# Patient Record
Sex: MALE | Race: WHITE | NOT HISPANIC OR LATINO | Employment: OTHER | URBAN - METROPOLITAN AREA
[De-identification: names, ages, dates, MRNs, and addresses within clinical notes are randomized per-mention and may not be internally consistent; named-entity substitution may affect disease eponyms.]

---

## 2017-05-25 ENCOUNTER — TRANSFERRED RECORDS (OUTPATIENT)
Dept: HEALTH INFORMATION MANAGEMENT | Facility: CLINIC | Age: 67
End: 2017-05-25

## 2017-06-22 ENCOUNTER — RECORDS - HEALTHEAST (OUTPATIENT)
Dept: LAB | Facility: CLINIC | Age: 67
End: 2017-06-22

## 2017-06-22 LAB
CHOLEST SERPL-MCNC: 233 MG/DL
FASTING STATUS PATIENT QL REPORTED: YES
HDLC SERPL-MCNC: 86 MG/DL
LDLC SERPL CALC-MCNC: 122 MG/DL
PSA SERPL-MCNC: 3.3 NG/ML (ref 0–4.5)
TRIGL SERPL-MCNC: 124 MG/DL

## 2017-08-03 ENCOUNTER — TRANSFERRED RECORDS (OUTPATIENT)
Dept: HEALTH INFORMATION MANAGEMENT | Facility: CLINIC | Age: 67
End: 2017-08-03

## 2017-10-06 ENCOUNTER — TRANSFERRED RECORDS (OUTPATIENT)
Dept: HEALTH INFORMATION MANAGEMENT | Facility: CLINIC | Age: 67
End: 2017-10-06

## 2017-12-05 ENCOUNTER — TRANSFERRED RECORDS (OUTPATIENT)
Dept: HEALTH INFORMATION MANAGEMENT | Facility: CLINIC | Age: 67
End: 2017-12-05

## 2017-12-05 ENCOUNTER — RECORDS - HEALTHEAST (OUTPATIENT)
Dept: LAB | Facility: CLINIC | Age: 67
End: 2017-12-05

## 2017-12-05 LAB
CHOLEST SERPL-MCNC: 263 MG/DL
FASTING STATUS PATIENT QL REPORTED: ABNORMAL
HDLC SERPL-MCNC: 96 MG/DL
LDLC SERPL CALC-MCNC: 138 MG/DL
TRIGL SERPL-MCNC: 147 MG/DL

## 2017-12-08 ENCOUNTER — TRANSFERRED RECORDS (OUTPATIENT)
Dept: HEALTH INFORMATION MANAGEMENT | Facility: CLINIC | Age: 67
End: 2017-12-08

## 2018-07-16 ENCOUNTER — RECORDS - HEALTHEAST (OUTPATIENT)
Dept: LAB | Facility: CLINIC | Age: 68
End: 2018-07-16

## 2018-07-16 LAB
ALBUMIN SERPL-MCNC: 4 G/DL (ref 3.5–5)
ALP SERPL-CCNC: 53 U/L (ref 45–120)
ALT SERPL W P-5'-P-CCNC: 31 U/L (ref 0–45)
ANION GAP SERPL CALCULATED.3IONS-SCNC: 12 MMOL/L (ref 5–18)
AST SERPL W P-5'-P-CCNC: 43 U/L (ref 0–40)
BASOPHILS # BLD AUTO: 0.1 THOU/UL (ref 0–0.2)
BASOPHILS NFR BLD AUTO: 1 % (ref 0–2)
BILIRUB SERPL-MCNC: 0.6 MG/DL (ref 0–1)
BUN SERPL-MCNC: 16 MG/DL (ref 8–22)
CALCIUM SERPL-MCNC: 9.4 MG/DL (ref 8.5–10.5)
CHLORIDE BLD-SCNC: 107 MMOL/L (ref 98–107)
CHOLEST SERPL-MCNC: 203 MG/DL
CO2 SERPL-SCNC: 22 MMOL/L (ref 22–31)
CREAT SERPL-MCNC: 0.9 MG/DL (ref 0.7–1.3)
EOSINOPHIL # BLD AUTO: 0.1 THOU/UL (ref 0–0.4)
EOSINOPHIL NFR BLD AUTO: 2 % (ref 0–6)
ERYTHROCYTE [DISTWIDTH] IN BLOOD BY AUTOMATED COUNT: 12.1 % (ref 11–14.5)
FASTING STATUS PATIENT QL REPORTED: ABNORMAL
GFR SERPL CREATININE-BSD FRML MDRD: >60 ML/MIN/1.73M2
GLUCOSE BLD-MCNC: 88 MG/DL (ref 70–125)
HCT VFR BLD AUTO: 41.1 % (ref 40–54)
HDLC SERPL-MCNC: 100 MG/DL
HGB BLD-MCNC: 13.9 G/DL (ref 14–18)
LDLC SERPL CALC-MCNC: 75 MG/DL
LYMPHOCYTES # BLD AUTO: 1.5 THOU/UL (ref 0.8–4.4)
LYMPHOCYTES NFR BLD AUTO: 20 % (ref 20–40)
MCH RBC QN AUTO: 32.9 PG (ref 27–34)
MCHC RBC AUTO-ENTMCNC: 33.8 G/DL (ref 32–36)
MCV RBC AUTO: 97 FL (ref 80–100)
MONOCYTES # BLD AUTO: 0.9 THOU/UL (ref 0–0.9)
MONOCYTES NFR BLD AUTO: 12 % (ref 2–10)
NEUTROPHILS # BLD AUTO: 4.8 THOU/UL (ref 2–7.7)
NEUTROPHILS NFR BLD AUTO: 66 % (ref 50–70)
PLATELET # BLD AUTO: 167 THOU/UL (ref 140–440)
PMV BLD AUTO: 10.1 FL (ref 8.5–12.5)
POTASSIUM BLD-SCNC: 4.5 MMOL/L (ref 3.5–5)
PROT SERPL-MCNC: 6.7 G/DL (ref 6–8)
PSA SERPL-MCNC: 2.8 NG/ML (ref 0–4.5)
RBC # BLD AUTO: 4.22 MILL/UL (ref 4.4–6.2)
SODIUM SERPL-SCNC: 141 MMOL/L (ref 136–145)
TRIGL SERPL-MCNC: 140 MG/DL
WBC: 7.4 THOU/UL (ref 4–11)

## 2019-04-11 ENCOUNTER — TRANSFERRED RECORDS (OUTPATIENT)
Dept: HEALTH INFORMATION MANAGEMENT | Facility: CLINIC | Age: 69
End: 2019-04-11

## 2019-05-04 ENCOUNTER — TRANSFERRED RECORDS (OUTPATIENT)
Dept: HEALTH INFORMATION MANAGEMENT | Facility: CLINIC | Age: 69
End: 2019-05-04

## 2019-07-15 ENCOUNTER — OFFICE VISIT - HEALTHEAST (OUTPATIENT)
Dept: ADDICTION MEDICINE | Facility: CLINIC | Age: 69
End: 2019-07-15

## 2019-07-15 DIAGNOSIS — Z03.89 NO DIAGNOSIS ON AXIS I: ICD-10-CM

## 2019-07-16 ENCOUNTER — AMBULATORY - HEALTHEAST (OUTPATIENT)
Dept: OTHER | Facility: CLINIC | Age: 69
End: 2019-07-16

## 2019-07-16 ENCOUNTER — DOCUMENTATION ONLY (OUTPATIENT)
Dept: OTHER | Facility: CLINIC | Age: 69
End: 2019-07-16

## 2019-07-22 ENCOUNTER — OFFICE VISIT - HEALTHEAST (OUTPATIENT)
Dept: ADDICTION MEDICINE | Facility: CLINIC | Age: 69
End: 2019-07-22

## 2019-07-22 ENCOUNTER — RECORDS - HEALTHEAST (OUTPATIENT)
Dept: LAB | Facility: CLINIC | Age: 69
End: 2019-07-22

## 2019-07-22 DIAGNOSIS — F10.20 SEVERE ALCOHOL USE DISORDER (H): ICD-10-CM

## 2019-07-23 ENCOUNTER — OFFICE VISIT - HEALTHEAST (OUTPATIENT)
Dept: ADDICTION MEDICINE | Facility: CLINIC | Age: 69
End: 2019-07-23

## 2019-07-23 ENCOUNTER — AMBULATORY - HEALTHEAST (OUTPATIENT)
Dept: ADDICTION MEDICINE | Facility: CLINIC | Age: 69
End: 2019-07-23

## 2019-07-23 DIAGNOSIS — F10.20 SEVERE ALCOHOL USE DISORDER (H): ICD-10-CM

## 2019-07-23 LAB
ALBUMIN SERPL-MCNC: 3.9 G/DL (ref 3.5–5)
ALP SERPL-CCNC: 69 U/L (ref 45–120)
ALT SERPL W P-5'-P-CCNC: 26 U/L (ref 0–45)
ANION GAP SERPL CALCULATED.3IONS-SCNC: 8 MMOL/L (ref 5–18)
AST SERPL W P-5'-P-CCNC: 30 U/L (ref 0–40)
BILIRUB SERPL-MCNC: 0.4 MG/DL (ref 0–1)
BUN SERPL-MCNC: 21 MG/DL (ref 8–22)
CALCIUM SERPL-MCNC: 9 MG/DL (ref 8.5–10.5)
CHLORIDE BLD-SCNC: 110 MMOL/L (ref 98–107)
CHOLEST SERPL-MCNC: 205 MG/DL
CO2 SERPL-SCNC: 24 MMOL/L (ref 22–31)
CREAT SERPL-MCNC: 1.07 MG/DL (ref 0.7–1.3)
FASTING STATUS PATIENT QL REPORTED: NO
GFR SERPL CREATININE-BSD FRML MDRD: >60 ML/MIN/1.73M2
GLUCOSE BLD-MCNC: 100 MG/DL (ref 70–125)
HDLC SERPL-MCNC: 70 MG/DL
LDLC SERPL CALC-MCNC: 88 MG/DL
POTASSIUM BLD-SCNC: 4.8 MMOL/L (ref 3.5–5)
PROT SERPL-MCNC: 6.7 G/DL (ref 6–8)
PSA SERPL-MCNC: 2.4 NG/ML (ref 0–4.5)
SODIUM SERPL-SCNC: 142 MMOL/L (ref 136–145)
TRIGL SERPL-MCNC: 236 MG/DL

## 2019-07-25 ENCOUNTER — OFFICE VISIT - HEALTHEAST (OUTPATIENT)
Dept: ADDICTION MEDICINE | Facility: CLINIC | Age: 69
End: 2019-07-25

## 2019-07-25 DIAGNOSIS — F10.20 SEVERE ALCOHOL USE DISORDER (H): ICD-10-CM

## 2019-07-29 ENCOUNTER — OFFICE VISIT - HEALTHEAST (OUTPATIENT)
Dept: ADDICTION MEDICINE | Facility: CLINIC | Age: 69
End: 2019-07-29

## 2019-07-29 DIAGNOSIS — F10.20 SEVERE ALCOHOL USE DISORDER (H): ICD-10-CM

## 2019-07-30 ENCOUNTER — OFFICE VISIT - HEALTHEAST (OUTPATIENT)
Dept: ADDICTION MEDICINE | Facility: CLINIC | Age: 69
End: 2019-07-30

## 2019-07-30 ENCOUNTER — AMBULATORY - HEALTHEAST (OUTPATIENT)
Dept: ADDICTION MEDICINE | Facility: CLINIC | Age: 69
End: 2019-07-30

## 2019-07-30 ENCOUNTER — OFFICE VISIT - HEALTHEAST (OUTPATIENT)
Dept: BEHAVIORAL HEALTH | Facility: CLINIC | Age: 69
End: 2019-07-30

## 2019-07-30 DIAGNOSIS — F10.20 SEVERE ALCOHOL USE DISORDER (H): ICD-10-CM

## 2019-07-30 DIAGNOSIS — F10.982 ALCOHOL-INDUCED INSOMNIA (H): ICD-10-CM

## 2019-07-30 DIAGNOSIS — F33.41 RECURRENT MAJOR DEPRESSIVE DISORDER, IN PARTIAL REMISSION (H): ICD-10-CM

## 2019-07-30 LAB
AMPHETAMINES UR QL SCN: NORMAL
BARBITURATES UR QL: NORMAL
BENZODIAZ UR QL: NORMAL
CANNABINOIDS UR QL SCN: NORMAL
COCAINE UR QL: NORMAL
CREAT UR-MCNC: 63 MG/DL
METHADONE UR QL SCN: NORMAL
OPIATES UR QL SCN: NORMAL
OXYCODONE UR QL: NORMAL
PCP UR QL SCN: NORMAL

## 2019-07-30 ASSESSMENT — MIFFLIN-ST. JEOR: SCORE: 1623.08

## 2019-08-02 ENCOUNTER — OFFICE VISIT - HEALTHEAST (OUTPATIENT)
Dept: ADDICTION MEDICINE | Facility: CLINIC | Age: 69
End: 2019-08-02

## 2019-08-02 DIAGNOSIS — F10.20 SEVERE ALCOHOL USE DISORDER (H): ICD-10-CM

## 2019-08-03 LAB
ETHYL GLUCURONIDE UR CFM-MCNC: <100 NG/ML
ETHYL SULFATE UR CFM-MCNC: <100 NG/ML

## 2019-08-05 ENCOUNTER — OFFICE VISIT - HEALTHEAST (OUTPATIENT)
Dept: ADDICTION MEDICINE | Facility: CLINIC | Age: 69
End: 2019-08-05

## 2019-08-05 DIAGNOSIS — F10.20 SEVERE ALCOHOL USE DISORDER (H): ICD-10-CM

## 2019-08-07 ENCOUNTER — OFFICE VISIT - HEALTHEAST (OUTPATIENT)
Dept: ADDICTION MEDICINE | Facility: CLINIC | Age: 69
End: 2019-08-07

## 2019-08-07 ENCOUNTER — OFFICE VISIT - HEALTHEAST (OUTPATIENT)
Dept: BEHAVIORAL HEALTH | Facility: CLINIC | Age: 69
End: 2019-08-07

## 2019-08-07 DIAGNOSIS — F10.20 SEVERE ALCOHOL USE DISORDER (H): ICD-10-CM

## 2019-08-07 DIAGNOSIS — F33.41 RECURRENT MAJOR DEPRESSIVE DISORDER, IN PARTIAL REMISSION (H): ICD-10-CM

## 2019-08-07 DIAGNOSIS — F10.982 ALCOHOL-INDUCED INSOMNIA (H): ICD-10-CM

## 2019-08-07 LAB
AMPHETAMINES UR QL SCN: NORMAL
BARBITURATES UR QL: NORMAL
BENZODIAZ UR QL: NORMAL
CANNABINOIDS UR QL SCN: NORMAL
COCAINE UR QL: NORMAL
CREAT UR-MCNC: 92.4 MG/DL
METHADONE UR QL SCN: NORMAL
OPIATES UR QL SCN: NORMAL
OXYCODONE UR QL: NORMAL
PCP UR QL SCN: NORMAL

## 2019-08-07 ASSESSMENT — MIFFLIN-ST. JEOR: SCORE: 1627.62

## 2019-08-09 ENCOUNTER — OFFICE VISIT - HEALTHEAST (OUTPATIENT)
Dept: ADDICTION MEDICINE | Facility: CLINIC | Age: 69
End: 2019-08-09

## 2019-08-09 ENCOUNTER — AMBULATORY - HEALTHEAST (OUTPATIENT)
Dept: ADDICTION MEDICINE | Facility: CLINIC | Age: 69
End: 2019-08-09

## 2019-08-09 DIAGNOSIS — F10.20 SEVERE ALCOHOL USE DISORDER (H): ICD-10-CM

## 2019-08-12 ENCOUNTER — OFFICE VISIT - HEALTHEAST (OUTPATIENT)
Dept: ADDICTION MEDICINE | Facility: CLINIC | Age: 69
End: 2019-08-12

## 2019-08-12 DIAGNOSIS — F10.20 SEVERE ALCOHOL USE DISORDER (H): ICD-10-CM

## 2019-08-13 LAB
ETHYL GLUCURONIDE UR CFM-MCNC: <100 NG/ML
ETHYL SULFATE UR CFM-MCNC: <100 NG/ML

## 2019-08-14 ENCOUNTER — TRANSFERRED RECORDS (OUTPATIENT)
Dept: HEALTH INFORMATION MANAGEMENT | Facility: CLINIC | Age: 69
End: 2019-08-14

## 2019-08-14 ENCOUNTER — OFFICE VISIT - HEALTHEAST (OUTPATIENT)
Dept: ADDICTION MEDICINE | Facility: CLINIC | Age: 69
End: 2019-08-14

## 2019-08-14 DIAGNOSIS — F10.20 SEVERE ALCOHOL USE DISORDER (H): ICD-10-CM

## 2019-08-16 ENCOUNTER — AMBULATORY - HEALTHEAST (OUTPATIENT)
Dept: ADDICTION MEDICINE | Facility: CLINIC | Age: 69
End: 2019-08-16

## 2019-08-16 ENCOUNTER — OFFICE VISIT - HEALTHEAST (OUTPATIENT)
Dept: ADDICTION MEDICINE | Facility: CLINIC | Age: 69
End: 2019-08-16

## 2019-08-16 DIAGNOSIS — F10.20 SEVERE ALCOHOL USE DISORDER (H): ICD-10-CM

## 2019-08-19 ENCOUNTER — OFFICE VISIT - HEALTHEAST (OUTPATIENT)
Dept: ADDICTION MEDICINE | Facility: CLINIC | Age: 69
End: 2019-08-19

## 2019-08-19 DIAGNOSIS — F10.20 SEVERE ALCOHOL USE DISORDER (H): ICD-10-CM

## 2019-08-21 ENCOUNTER — TRANSFERRED RECORDS (OUTPATIENT)
Dept: HEALTH INFORMATION MANAGEMENT | Facility: CLINIC | Age: 69
End: 2019-08-21

## 2019-08-22 ENCOUNTER — AMBULATORY - HEALTHEAST (OUTPATIENT)
Dept: ADDICTION MEDICINE | Facility: CLINIC | Age: 69
End: 2019-08-22

## 2019-08-23 ENCOUNTER — OFFICE VISIT - HEALTHEAST (OUTPATIENT)
Dept: ADDICTION MEDICINE | Facility: CLINIC | Age: 69
End: 2019-08-23

## 2019-08-23 ENCOUNTER — AMBULATORY - HEALTHEAST (OUTPATIENT)
Dept: LAB | Facility: CLINIC | Age: 69
End: 2019-08-23

## 2019-08-23 DIAGNOSIS — F10.20 SEVERE ALCOHOL USE DISORDER (H): ICD-10-CM

## 2019-08-23 LAB
AMPHETAMINES UR QL SCN: NORMAL
BARBITURATES UR QL: NORMAL
BENZODIAZ UR QL: NORMAL
CANNABINOIDS UR QL SCN: NORMAL
COCAINE UR QL: NORMAL
CREAT UR-MCNC: 50.2 MG/DL
METHADONE UR QL SCN: NORMAL
OPIATES UR QL SCN: NORMAL
OXYCODONE UR QL: NORMAL
PCP UR QL SCN: NORMAL

## 2019-08-26 ENCOUNTER — OFFICE VISIT - HEALTHEAST (OUTPATIENT)
Dept: ADDICTION MEDICINE | Facility: CLINIC | Age: 69
End: 2019-08-26

## 2019-08-26 DIAGNOSIS — F10.20 SEVERE ALCOHOL USE DISORDER (H): ICD-10-CM

## 2019-08-28 ENCOUNTER — OFFICE VISIT - HEALTHEAST (OUTPATIENT)
Dept: ADDICTION MEDICINE | Facility: CLINIC | Age: 69
End: 2019-08-28

## 2019-08-28 DIAGNOSIS — F10.20 SEVERE ALCOHOL USE DISORDER (H): ICD-10-CM

## 2019-08-28 LAB
ETHYL GLUCURONIDE UR CFM-MCNC: <100 NG/ML
ETHYL SULFATE UR CFM-MCNC: <100 NG/ML

## 2019-08-29 ENCOUNTER — AMBULATORY - HEALTHEAST (OUTPATIENT)
Dept: ADDICTION MEDICINE | Facility: CLINIC | Age: 69
End: 2019-08-29

## 2019-08-29 ENCOUNTER — COMMUNICATION - HEALTHEAST (OUTPATIENT)
Dept: BEHAVIORAL HEALTH | Facility: CLINIC | Age: 69
End: 2019-08-29

## 2019-08-30 ENCOUNTER — OFFICE VISIT - HEALTHEAST (OUTPATIENT)
Dept: ADDICTION MEDICINE | Facility: CLINIC | Age: 69
End: 2019-08-30

## 2019-08-30 DIAGNOSIS — F10.20 SEVERE ALCOHOL USE DISORDER (H): ICD-10-CM

## 2019-09-04 ENCOUNTER — OFFICE VISIT - HEALTHEAST (OUTPATIENT)
Dept: ADDICTION MEDICINE | Facility: CLINIC | Age: 69
End: 2019-09-04

## 2019-09-04 ENCOUNTER — COMMUNICATION - HEALTHEAST (OUTPATIENT)
Dept: BEHAVIORAL HEALTH | Facility: CLINIC | Age: 69
End: 2019-09-04

## 2019-09-04 DIAGNOSIS — F10.20 SEVERE ALCOHOL USE DISORDER (H): ICD-10-CM

## 2019-09-05 ENCOUNTER — AMBULATORY - HEALTHEAST (OUTPATIENT)
Dept: ADDICTION MEDICINE | Facility: CLINIC | Age: 69
End: 2019-09-05

## 2019-09-06 ENCOUNTER — OFFICE VISIT - HEALTHEAST (OUTPATIENT)
Dept: ADDICTION MEDICINE | Facility: CLINIC | Age: 69
End: 2019-09-06

## 2019-09-06 DIAGNOSIS — F10.20 SEVERE ALCOHOL USE DISORDER (H): ICD-10-CM

## 2019-09-09 ENCOUNTER — OFFICE VISIT - HEALTHEAST (OUTPATIENT)
Dept: ADDICTION MEDICINE | Facility: CLINIC | Age: 69
End: 2019-09-09

## 2019-09-09 ENCOUNTER — OFFICE VISIT - HEALTHEAST (OUTPATIENT)
Dept: BEHAVIORAL HEALTH | Facility: CLINIC | Age: 69
End: 2019-09-09

## 2019-09-09 DIAGNOSIS — F10.20 SEVERE ALCOHOL USE DISORDER (H): ICD-10-CM

## 2019-09-09 DIAGNOSIS — F33.41 RECURRENT MAJOR DEPRESSIVE DISORDER, IN PARTIAL REMISSION (H): ICD-10-CM

## 2019-09-09 LAB
AMPHETAMINES UR QL SCN: NORMAL
BARBITURATES UR QL: NORMAL
BENZODIAZ UR QL: NORMAL
CANNABINOIDS UR QL SCN: NORMAL
COCAINE UR QL: NORMAL
CREAT UR-MCNC: 54.4 MG/DL
METHADONE UR QL SCN: NORMAL
OPIATES UR QL SCN: NORMAL
OXYCODONE UR QL: NORMAL
PCP UR QL SCN: NORMAL

## 2019-09-09 ASSESSMENT — ANXIETY QUESTIONNAIRES
2. NOT BEING ABLE TO STOP OR CONTROL WORRYING: NOT AT ALL
3. WORRYING TOO MUCH ABOUT DIFFERENT THINGS: SEVERAL DAYS
IF YOU CHECKED OFF ANY PROBLEMS ON THIS QUESTIONNAIRE, HOW DIFFICULT HAVE THESE PROBLEMS MADE IT FOR YOU TO DO YOUR WORK, TAKE CARE OF THINGS AT HOME, OR GET ALONG WITH OTHER PEOPLE: SOMEWHAT DIFFICULT
4. TROUBLE RELAXING: MORE THAN HALF THE DAYS
GAD7 TOTAL SCORE: 7
1. FEELING NERVOUS, ANXIOUS, OR ON EDGE: SEVERAL DAYS
5. BEING SO RESTLESS THAT IT IS HARD TO SIT STILL: SEVERAL DAYS
7. FEELING AFRAID AS IF SOMETHING AWFUL MIGHT HAPPEN: SEVERAL DAYS
6. BECOMING EASILY ANNOYED OR IRRITABLE: SEVERAL DAYS

## 2019-09-09 ASSESSMENT — PATIENT HEALTH QUESTIONNAIRE - PHQ9: SUM OF ALL RESPONSES TO PHQ QUESTIONS 1-9: 15

## 2019-09-09 ASSESSMENT — MIFFLIN-ST. JEOR: SCORE: 1663.91

## 2019-09-11 ENCOUNTER — OFFICE VISIT - HEALTHEAST (OUTPATIENT)
Dept: ADDICTION MEDICINE | Facility: CLINIC | Age: 69
End: 2019-09-11

## 2019-09-11 DIAGNOSIS — F10.20 SEVERE ALCOHOL USE DISORDER (H): ICD-10-CM

## 2019-09-12 ENCOUNTER — AMBULATORY - HEALTHEAST (OUTPATIENT)
Dept: ADDICTION MEDICINE | Facility: CLINIC | Age: 69
End: 2019-09-12

## 2019-09-12 LAB
ETHYL GLUCURONIDE UR CFM-MCNC: <100 NG/ML
ETHYL SULFATE UR CFM-MCNC: <100 NG/ML

## 2019-09-13 ENCOUNTER — OFFICE VISIT - HEALTHEAST (OUTPATIENT)
Dept: ADDICTION MEDICINE | Facility: CLINIC | Age: 69
End: 2019-09-13

## 2019-09-13 DIAGNOSIS — F10.20 SEVERE ALCOHOL USE DISORDER (H): ICD-10-CM

## 2019-09-16 ENCOUNTER — OFFICE VISIT - HEALTHEAST (OUTPATIENT)
Dept: ADDICTION MEDICINE | Facility: CLINIC | Age: 69
End: 2019-09-16

## 2019-09-16 DIAGNOSIS — F10.20 SEVERE ALCOHOL USE DISORDER (H): ICD-10-CM

## 2019-09-18 ENCOUNTER — OFFICE VISIT - HEALTHEAST (OUTPATIENT)
Dept: ADDICTION MEDICINE | Facility: CLINIC | Age: 69
End: 2019-09-18

## 2019-09-18 DIAGNOSIS — F10.20 SEVERE ALCOHOL USE DISORDER (H): ICD-10-CM

## 2019-09-19 ENCOUNTER — AMBULATORY - HEALTHEAST (OUTPATIENT)
Dept: ADDICTION MEDICINE | Facility: CLINIC | Age: 69
End: 2019-09-19

## 2019-09-20 ENCOUNTER — OFFICE VISIT - HEALTHEAST (OUTPATIENT)
Dept: ADDICTION MEDICINE | Facility: CLINIC | Age: 69
End: 2019-09-20

## 2019-09-20 DIAGNOSIS — F10.20 SEVERE ALCOHOL USE DISORDER (H): ICD-10-CM

## 2019-09-23 ENCOUNTER — OFFICE VISIT - HEALTHEAST (OUTPATIENT)
Dept: ADDICTION MEDICINE | Facility: CLINIC | Age: 69
End: 2019-09-23

## 2019-09-23 ENCOUNTER — OFFICE VISIT - HEALTHEAST (OUTPATIENT)
Dept: BEHAVIORAL HEALTH | Facility: CLINIC | Age: 69
End: 2019-09-23

## 2019-09-23 DIAGNOSIS — F33.41 RECURRENT MAJOR DEPRESSIVE DISORDER, IN PARTIAL REMISSION (H): ICD-10-CM

## 2019-09-23 DIAGNOSIS — F10.20 SEVERE ALCOHOL USE DISORDER (H): ICD-10-CM

## 2019-09-23 DIAGNOSIS — F10.982 ALCOHOL-INDUCED INSOMNIA (H): ICD-10-CM

## 2019-09-23 LAB
AMPHETAMINES UR QL SCN: NORMAL
BARBITURATES UR QL: NORMAL
BENZODIAZ UR QL: NORMAL
CANNABINOIDS UR QL SCN: NORMAL
COCAINE UR QL: NORMAL
CREAT UR-MCNC: 75.3 MG/DL
METHADONE UR QL SCN: NORMAL
OPIATES UR QL SCN: NORMAL
OXYCODONE UR QL: NORMAL
PCP UR QL SCN: NORMAL

## 2019-09-23 ASSESSMENT — MIFFLIN-ST. JEOR: SCORE: 1672.98

## 2019-09-23 ASSESSMENT — ANXIETY QUESTIONNAIRES
3. WORRYING TOO MUCH ABOUT DIFFERENT THINGS: SEVERAL DAYS
1. FEELING NERVOUS, ANXIOUS, OR ON EDGE: SEVERAL DAYS
5. BEING SO RESTLESS THAT IT IS HARD TO SIT STILL: SEVERAL DAYS
2. NOT BEING ABLE TO STOP OR CONTROL WORRYING: SEVERAL DAYS
6. BECOMING EASILY ANNOYED OR IRRITABLE: SEVERAL DAYS
IF YOU CHECKED OFF ANY PROBLEMS ON THIS QUESTIONNAIRE, HOW DIFFICULT HAVE THESE PROBLEMS MADE IT FOR YOU TO DO YOUR WORK, TAKE CARE OF THINGS AT HOME, OR GET ALONG WITH OTHER PEOPLE: SOMEWHAT DIFFICULT
4. TROUBLE RELAXING: MORE THAN HALF THE DAYS
GAD7 TOTAL SCORE: 8
7. FEELING AFRAID AS IF SOMETHING AWFUL MIGHT HAPPEN: SEVERAL DAYS

## 2019-09-23 ASSESSMENT — PATIENT HEALTH QUESTIONNAIRE - PHQ9: SUM OF ALL RESPONSES TO PHQ QUESTIONS 1-9: 16

## 2019-09-25 ENCOUNTER — OFFICE VISIT - HEALTHEAST (OUTPATIENT)
Dept: ADDICTION MEDICINE | Facility: CLINIC | Age: 69
End: 2019-09-25

## 2019-09-25 DIAGNOSIS — F10.20 SEVERE ALCOHOL USE DISORDER (H): ICD-10-CM

## 2019-09-26 LAB
ETHYL GLUCURONIDE UR CFM-MCNC: <100 NG/ML
ETHYL SULFATE UR CFM-MCNC: <100 NG/ML

## 2019-09-27 ENCOUNTER — AMBULATORY - HEALTHEAST (OUTPATIENT)
Dept: ADDICTION MEDICINE | Facility: CLINIC | Age: 69
End: 2019-09-27

## 2019-09-27 ENCOUNTER — OFFICE VISIT - HEALTHEAST (OUTPATIENT)
Dept: ADDICTION MEDICINE | Facility: CLINIC | Age: 69
End: 2019-09-27

## 2019-09-27 DIAGNOSIS — F10.20 SEVERE ALCOHOL USE DISORDER (H): ICD-10-CM

## 2019-09-30 ENCOUNTER — OFFICE VISIT - HEALTHEAST (OUTPATIENT)
Dept: ADDICTION MEDICINE | Facility: CLINIC | Age: 69
End: 2019-09-30

## 2019-09-30 DIAGNOSIS — F10.20 SEVERE ALCOHOL USE DISORDER (H): ICD-10-CM

## 2019-10-02 ENCOUNTER — OFFICE VISIT - HEALTHEAST (OUTPATIENT)
Dept: ADDICTION MEDICINE | Facility: CLINIC | Age: 69
End: 2019-10-02

## 2019-10-02 DIAGNOSIS — F10.20 SEVERE ALCOHOL USE DISORDER (H): ICD-10-CM

## 2019-10-03 ENCOUNTER — AMBULATORY - HEALTHEAST (OUTPATIENT)
Dept: ADDICTION MEDICINE | Facility: CLINIC | Age: 69
End: 2019-10-03

## 2019-10-04 ENCOUNTER — OFFICE VISIT - HEALTHEAST (OUTPATIENT)
Dept: ADDICTION MEDICINE | Facility: CLINIC | Age: 69
End: 2019-10-04

## 2019-10-04 DIAGNOSIS — F10.20 SEVERE ALCOHOL USE DISORDER (H): ICD-10-CM

## 2019-10-07 ENCOUNTER — AMBULATORY - HEALTHEAST (OUTPATIENT)
Dept: ADDICTION MEDICINE | Facility: CLINIC | Age: 69
End: 2019-10-07

## 2019-10-07 ENCOUNTER — OFFICE VISIT - HEALTHEAST (OUTPATIENT)
Dept: ADDICTION MEDICINE | Facility: CLINIC | Age: 69
End: 2019-10-07

## 2019-10-07 DIAGNOSIS — F10.20 SEVERE ALCOHOL USE DISORDER (H): ICD-10-CM

## 2019-10-10 ENCOUNTER — AMBULATORY - HEALTHEAST (OUTPATIENT)
Dept: ADDICTION MEDICINE | Facility: CLINIC | Age: 69
End: 2019-10-10

## 2019-10-14 ENCOUNTER — COMMUNICATION - HEALTHEAST (OUTPATIENT)
Dept: ADDICTION MEDICINE | Facility: CLINIC | Age: 69
End: 2019-10-14

## 2019-10-17 ENCOUNTER — AMBULATORY - HEALTHEAST (OUTPATIENT)
Dept: ADDICTION MEDICINE | Facility: CLINIC | Age: 69
End: 2019-10-17

## 2019-10-18 ENCOUNTER — OFFICE VISIT - HEALTHEAST (OUTPATIENT)
Dept: ADDICTION MEDICINE | Facility: CLINIC | Age: 69
End: 2019-10-18

## 2019-10-18 DIAGNOSIS — F10.20 SEVERE ALCOHOL USE DISORDER (H): ICD-10-CM

## 2019-10-21 ENCOUNTER — OFFICE VISIT - HEALTHEAST (OUTPATIENT)
Dept: ADDICTION MEDICINE | Facility: CLINIC | Age: 69
End: 2019-10-21

## 2019-10-21 DIAGNOSIS — F10.20 SEVERE ALCOHOL USE DISORDER (H): ICD-10-CM

## 2019-10-24 ENCOUNTER — AMBULATORY - HEALTHEAST (OUTPATIENT)
Dept: ADDICTION MEDICINE | Facility: CLINIC | Age: 69
End: 2019-10-24

## 2019-10-25 ENCOUNTER — OFFICE VISIT - HEALTHEAST (OUTPATIENT)
Dept: ADDICTION MEDICINE | Facility: CLINIC | Age: 69
End: 2019-10-25

## 2019-10-25 DIAGNOSIS — F10.20 SEVERE ALCOHOL USE DISORDER (H): ICD-10-CM

## 2019-10-28 ENCOUNTER — OFFICE VISIT - HEALTHEAST (OUTPATIENT)
Dept: ADDICTION MEDICINE | Facility: CLINIC | Age: 69
End: 2019-10-28

## 2019-10-28 DIAGNOSIS — F10.20 SEVERE ALCOHOL USE DISORDER (H): ICD-10-CM

## 2019-10-31 ENCOUNTER — TRANSFERRED RECORDS (OUTPATIENT)
Dept: HEALTH INFORMATION MANAGEMENT | Facility: CLINIC | Age: 69
End: 2019-10-31

## 2019-10-31 ENCOUNTER — RECORDS - HEALTHEAST (OUTPATIENT)
Dept: LAB | Facility: CLINIC | Age: 69
End: 2019-10-31

## 2019-10-31 LAB — VIT B12 SERPL-MCNC: 869 PG/ML (ref 213–816)

## 2019-11-01 ENCOUNTER — OFFICE VISIT - HEALTHEAST (OUTPATIENT)
Dept: ADDICTION MEDICINE | Facility: CLINIC | Age: 69
End: 2019-11-01

## 2019-11-01 DIAGNOSIS — F10.20 SEVERE ALCOHOL USE DISORDER (H): ICD-10-CM

## 2019-11-04 ENCOUNTER — OFFICE VISIT - HEALTHEAST (OUTPATIENT)
Dept: ADDICTION MEDICINE | Facility: CLINIC | Age: 69
End: 2019-11-04

## 2019-11-04 DIAGNOSIS — F10.20 SEVERE ALCOHOL USE DISORDER (H): ICD-10-CM

## 2019-11-05 ENCOUNTER — AMBULATORY - HEALTHEAST (OUTPATIENT)
Dept: ADDICTION MEDICINE | Facility: CLINIC | Age: 69
End: 2019-11-05

## 2019-11-07 ENCOUNTER — AMBULATORY - HEALTHEAST (OUTPATIENT)
Dept: ADDICTION MEDICINE | Facility: CLINIC | Age: 69
End: 2019-11-07

## 2019-11-08 ENCOUNTER — OFFICE VISIT - HEALTHEAST (OUTPATIENT)
Dept: ADDICTION MEDICINE | Facility: CLINIC | Age: 69
End: 2019-11-08

## 2019-11-08 DIAGNOSIS — F10.20 SEVERE ALCOHOL USE DISORDER (H): ICD-10-CM

## 2019-11-12 ENCOUNTER — AMBULATORY - HEALTHEAST (OUTPATIENT)
Dept: ADDICTION MEDICINE | Facility: CLINIC | Age: 69
End: 2019-11-12

## 2019-11-12 ENCOUNTER — OFFICE VISIT - HEALTHEAST (OUTPATIENT)
Dept: ADDICTION MEDICINE | Facility: CLINIC | Age: 69
End: 2019-11-12

## 2019-11-12 DIAGNOSIS — F10.20 SEVERE ALCOHOL USE DISORDER (H): ICD-10-CM

## 2019-11-19 ENCOUNTER — OFFICE VISIT - HEALTHEAST (OUTPATIENT)
Dept: ADDICTION MEDICINE | Facility: CLINIC | Age: 69
End: 2019-11-19

## 2019-11-19 ENCOUNTER — AMBULATORY - HEALTHEAST (OUTPATIENT)
Dept: ADDICTION MEDICINE | Facility: CLINIC | Age: 69
End: 2019-11-19

## 2019-11-19 DIAGNOSIS — F10.20 SEVERE ALCOHOL USE DISORDER (H): ICD-10-CM

## 2019-11-22 ENCOUNTER — RECORDS - HEALTHEAST (OUTPATIENT)
Dept: ADMINISTRATIVE | Facility: OTHER | Age: 69
End: 2019-11-22

## 2019-11-22 LAB
LAB AP CHARGES (HE HISTORICAL CONVERSION): NORMAL
PATH REPORT.COMMENTS IMP SPEC: NORMAL
PATH REPORT.FINAL DX SPEC: NORMAL
PATH REPORT.GROSS SPEC: NORMAL
PATH REPORT.MICROSCOPIC SPEC OTHER STN: NORMAL
PATH REPORT.RELEVANT HX SPEC: NORMAL
RESULT FLAG (HE HISTORICAL CONVERSION): NORMAL

## 2019-11-26 ENCOUNTER — OFFICE VISIT - HEALTHEAST (OUTPATIENT)
Dept: ADDICTION MEDICINE | Facility: CLINIC | Age: 69
End: 2019-11-26

## 2019-11-26 DIAGNOSIS — F10.20 SEVERE ALCOHOL USE DISORDER (H): ICD-10-CM

## 2019-11-27 ENCOUNTER — AMBULATORY - HEALTHEAST (OUTPATIENT)
Dept: ADDICTION MEDICINE | Facility: CLINIC | Age: 69
End: 2019-11-27

## 2019-12-03 ENCOUNTER — OFFICE VISIT - HEALTHEAST (OUTPATIENT)
Dept: ADDICTION MEDICINE | Facility: CLINIC | Age: 69
End: 2019-12-03

## 2019-12-03 DIAGNOSIS — F10.20 SEVERE ALCOHOL USE DISORDER (H): ICD-10-CM

## 2019-12-05 ENCOUNTER — AMBULATORY - HEALTHEAST (OUTPATIENT)
Dept: ADDICTION MEDICINE | Facility: CLINIC | Age: 69
End: 2019-12-05

## 2019-12-10 ENCOUNTER — OFFICE VISIT - HEALTHEAST (OUTPATIENT)
Dept: ADDICTION MEDICINE | Facility: CLINIC | Age: 69
End: 2019-12-10

## 2019-12-10 DIAGNOSIS — F10.20 SEVERE ALCOHOL USE DISORDER (H): ICD-10-CM

## 2019-12-12 ENCOUNTER — AMBULATORY - HEALTHEAST (OUTPATIENT)
Dept: ADDICTION MEDICINE | Facility: CLINIC | Age: 69
End: 2019-12-12

## 2019-12-17 ENCOUNTER — OFFICE VISIT - HEALTHEAST (OUTPATIENT)
Dept: ADDICTION MEDICINE | Facility: CLINIC | Age: 69
End: 2019-12-17

## 2019-12-17 ENCOUNTER — AMBULATORY - HEALTHEAST (OUTPATIENT)
Dept: ADDICTION MEDICINE | Facility: CLINIC | Age: 69
End: 2019-12-17

## 2019-12-17 DIAGNOSIS — F10.20 SEVERE ALCOHOL USE DISORDER (H): ICD-10-CM

## 2019-12-23 ENCOUNTER — AMBULATORY - HEALTHEAST (OUTPATIENT)
Dept: ADDICTION MEDICINE | Facility: CLINIC | Age: 69
End: 2019-12-23

## 2019-12-24 ENCOUNTER — OFFICE VISIT - HEALTHEAST (OUTPATIENT)
Dept: ADDICTION MEDICINE | Facility: CLINIC | Age: 69
End: 2019-12-24

## 2019-12-24 DIAGNOSIS — F10.20 SEVERE ALCOHOL USE DISORDER (H): ICD-10-CM

## 2019-12-31 ENCOUNTER — OFFICE VISIT - HEALTHEAST (OUTPATIENT)
Dept: ADDICTION MEDICINE | Facility: CLINIC | Age: 69
End: 2019-12-31

## 2019-12-31 DIAGNOSIS — F10.20 SEVERE ALCOHOL USE DISORDER (H): ICD-10-CM

## 2020-01-02 ENCOUNTER — AMBULATORY - HEALTHEAST (OUTPATIENT)
Dept: ADDICTION MEDICINE | Facility: CLINIC | Age: 70
End: 2020-01-02

## 2020-01-07 ENCOUNTER — AMBULATORY - HEALTHEAST (OUTPATIENT)
Dept: ADDICTION MEDICINE | Facility: CLINIC | Age: 70
End: 2020-01-07

## 2020-01-07 ENCOUNTER — OFFICE VISIT - HEALTHEAST (OUTPATIENT)
Dept: ADDICTION MEDICINE | Facility: CLINIC | Age: 70
End: 2020-01-07

## 2020-01-07 DIAGNOSIS — F10.20 SEVERE ALCOHOL USE DISORDER (H): ICD-10-CM

## 2020-01-14 ENCOUNTER — AMBULATORY - HEALTHEAST (OUTPATIENT)
Dept: ADDICTION MEDICINE | Facility: CLINIC | Age: 70
End: 2020-01-14

## 2020-01-14 ENCOUNTER — OFFICE VISIT - HEALTHEAST (OUTPATIENT)
Dept: ADDICTION MEDICINE | Facility: CLINIC | Age: 70
End: 2020-01-14

## 2020-01-14 DIAGNOSIS — F10.20 SEVERE ALCOHOL USE DISORDER (H): ICD-10-CM

## 2020-01-21 ENCOUNTER — OFFICE VISIT - HEALTHEAST (OUTPATIENT)
Dept: ADDICTION MEDICINE | Facility: CLINIC | Age: 70
End: 2020-01-21

## 2020-01-21 ENCOUNTER — AMBULATORY - HEALTHEAST (OUTPATIENT)
Dept: ADDICTION MEDICINE | Facility: CLINIC | Age: 70
End: 2020-01-21

## 2020-01-21 DIAGNOSIS — F10.20 SEVERE ALCOHOL USE DISORDER (H): ICD-10-CM

## 2020-01-27 ENCOUNTER — AMBULATORY - HEALTHEAST (OUTPATIENT)
Dept: ADDICTION MEDICINE | Facility: CLINIC | Age: 70
End: 2020-01-27

## 2020-01-28 ENCOUNTER — OFFICE VISIT - HEALTHEAST (OUTPATIENT)
Dept: ADDICTION MEDICINE | Facility: CLINIC | Age: 70
End: 2020-01-28

## 2020-01-28 DIAGNOSIS — F10.20 SEVERE ALCOHOL USE DISORDER (H): ICD-10-CM

## 2020-01-30 ENCOUNTER — AMBULATORY - HEALTHEAST (OUTPATIENT)
Dept: ADDICTION MEDICINE | Facility: CLINIC | Age: 70
End: 2020-01-30

## 2020-02-04 ENCOUNTER — OFFICE VISIT - HEALTHEAST (OUTPATIENT)
Dept: ADDICTION MEDICINE | Facility: CLINIC | Age: 70
End: 2020-02-04

## 2020-02-04 DIAGNOSIS — F10.20 SEVERE ALCOHOL USE DISORDER (H): ICD-10-CM

## 2020-02-07 ENCOUNTER — AMBULATORY - HEALTHEAST (OUTPATIENT)
Dept: ADDICTION MEDICINE | Facility: CLINIC | Age: 70
End: 2020-02-07

## 2020-03-19 ENCOUNTER — COMMUNICATION - HEALTHEAST (OUTPATIENT)
Dept: BEHAVIORAL HEALTH | Facility: CLINIC | Age: 70
End: 2020-03-19

## 2020-03-19 DIAGNOSIS — F10.982 ALCOHOL-INDUCED INSOMNIA (H): ICD-10-CM

## 2020-09-14 ENCOUNTER — OFFICE VISIT (OUTPATIENT)
Dept: FAMILY MEDICINE | Facility: CLINIC | Age: 70
End: 2020-09-14
Payer: MEDICARE

## 2020-09-14 VITALS
DIASTOLIC BLOOD PRESSURE: 68 MMHG | TEMPERATURE: 98 F | WEIGHT: 223.4 LBS | HEIGHT: 70 IN | BODY MASS INDEX: 31.98 KG/M2 | RESPIRATION RATE: 20 BRPM | HEART RATE: 80 BPM | SYSTOLIC BLOOD PRESSURE: 105 MMHG | OXYGEN SATURATION: 97 %

## 2020-09-14 DIAGNOSIS — L30.9 DERMATITIS: Primary | ICD-10-CM

## 2020-09-14 DIAGNOSIS — F34.1 DYSTHYMIA: ICD-10-CM

## 2020-09-14 DIAGNOSIS — M77.02 MEDIAL EPICONDYLITIS OF ELBOW, LEFT: ICD-10-CM

## 2020-09-14 DIAGNOSIS — G47.00 INSOMNIA, UNSPECIFIED TYPE: ICD-10-CM

## 2020-09-14 DIAGNOSIS — F10.21 ALCOHOL DEPENDENCE IN EARLY FULL REMISSION (H): ICD-10-CM

## 2020-09-14 PROBLEM — F10.20 SEVERE ALCOHOL USE DISORDER (H): Status: ACTIVE | Noted: 2019-06-20

## 2020-09-14 PROCEDURE — 99203 OFFICE O/P NEW LOW 30 MIN: CPT | Performed by: FAMILY MEDICINE

## 2020-09-14 RX ORDER — IPRATROPIUM BROMIDE 42 UG/1
2 SPRAY, METERED NASAL 3 TIMES DAILY
COMMUNITY

## 2020-09-14 RX ORDER — CYCLOSPORINE 0.5 MG/ML
1 EMULSION OPHTHALMIC
COMMUNITY
End: 2021-09-13

## 2020-09-14 RX ORDER — CITALOPRAM HYDROBROMIDE 40 MG/1
40 TABLET ORAL
COMMUNITY
End: 2021-05-27

## 2020-09-14 RX ORDER — TRIAMCINOLONE ACETONIDE 1 MG/G
CREAM TOPICAL 2 TIMES DAILY
Qty: 453 G | Refills: 0 | Status: SHIPPED | OUTPATIENT
Start: 2020-09-14 | End: 2021-04-19

## 2020-09-14 RX ORDER — IBUPROFEN 800 MG/1
800 TABLET, FILM COATED ORAL
COMMUNITY
End: 2021-03-18

## 2020-09-14 RX ORDER — GABAPENTIN 100 MG/1
100 CAPSULE ORAL 2 TIMES DAILY
COMMUNITY
End: 2020-09-14

## 2020-09-14 RX ORDER — TRAZODONE HYDROCHLORIDE 50 MG/1
50 TABLET, FILM COATED ORAL 2 TIMES DAILY
COMMUNITY
End: 2020-09-14

## 2020-09-14 RX ORDER — GABAPENTIN 100 MG/1
200 CAPSULE ORAL AT BEDTIME
Qty: 60 CAPSULE | COMMUNITY
Start: 2020-09-14 | End: 2021-04-27

## 2020-09-14 RX ORDER — PRAVASTATIN SODIUM 40 MG
40 TABLET ORAL
COMMUNITY
End: 2021-05-27

## 2020-09-14 RX ORDER — TRAZODONE HYDROCHLORIDE 50 MG/1
100 TABLET, FILM COATED ORAL AT BEDTIME
Qty: 60 TABLET | COMMUNITY
Start: 2020-09-14 | End: 2021-02-22

## 2020-09-14 RX ORDER — AMMONIUM LACTATE 12 G/100G
CREAM TOPICAL 2 TIMES DAILY
COMMUNITY
End: 2020-09-14

## 2020-09-14 ASSESSMENT — MIFFLIN-ST. JEOR: SCORE: 1779.59

## 2020-09-14 NOTE — PROGRESS NOTES
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  OroPharynx Exam: Dental hygiene adequate. Normal buccal mucosa. Normal pharynx.  Neck Exam: Supple, no masses or enlarged, tender nodes.  Cardiovascular Exam: No edema or vascular insufficiency.  Skin: excoriations      Also having some pain in his left medial elbow.  Worse with activity  Work related: no    Prob pert ROS:  Neuro: no significant localized weakness, numbness, tingling.    Prob pert exam .  Normal R & L upper extremity joints for ROM symmetry & tone/ no tenderness/ no effusions/ no crepitus/ or deformities.  Except:  Elbow exam -  Except Mild tenderness left medial epicondyle.  Neurological exam reveals normal without focal findings. DTR's, motor strength and sensation normal.    Had treatment for ETOH, has been sober for 1 year    Has chronic insomnia  Has had a test for sleep apnea, negative      Patient Active Problem List   Diagnosis     HTN (hypertension)     Hyperlipidemia     Insomnia     LVH (left ventricular hypertrophy)     Alcohol dependence in early full remission (H)     Ex-smoker     Dysthymia     History reviewed. No pertinent surgical history.    Social History     Tobacco Use     Smoking status: Former Smoker     Last attempt to quit: 5/10/2008     Years since quittin.3     Smokeless tobacco: Never Used   Substance Use Topics     Alcohol use: Yes     Comment: drinks daily approx 3 drinks of vodka, last drink at 2200 yesterday     Family History   Problem Relation Age of Onset      "Cancer Mother      Brain Cancer Mother          Allergies, reviewed:   No Known Allergies    Current Outpatient Medications   Medication Sig Dispense Refill     Cholecalciferol (VITAMIN D) 2000 UNIT CAPS Take  by mouth.       citalopram (CELEXA) 40 MG tablet Take 40 mg by mouth       cycloSPORINE (RESTASIS) 0.05 % ophthalmic emulsion 1 drop       gabapentin (NEURONTIN) 100 MG capsule Take 2 capsules (200 mg) by mouth At Bedtime 60 capsule      ibuprofen (ADVIL/MOTRIN) 800 MG tablet Take 800 mg by mouth       ipratropium (ATROVENT) 0.06 % nasal spray Spray 1 spray in nostril       Melatonin 2.5 MG CAPS Take by mouth daily       Multiple Vitamins-Minerals (MULTIVITAMIN ADULT PO) Take 1 tablet by mouth       pravastatin (PRAVACHOL) 40 MG tablet Take 40 mg by mouth       traZODone (DESYREL) 50 MG tablet Take 2 tablets (100 mg) by mouth At Bedtime 60 tablet      triamcinolone (KENALOG) 0.1 % external cream Apply topically 2 times daily 453 g 0         Review of Systems   Constitutional, HEENT, cardiovascular, pulmonary, GI, , musculoskeletal, neuro, skin, endocrine and psych systems are negative, except as otherwise noted.      Objective    /68 (Patient Position: Sitting, Cuff Size: Adult Regular)   Pulse 80   Temp 98  F (36.7  C) (Tympanic)   Resp 20   Ht 1.778 m (5' 10\")   Wt 101.3 kg (223 lb 6.4 oz)   SpO2 97%   BMI 32.05 kg/m    Body mass index is 32.05 kg/m .  Physical Exam   General Appearance: Pleasant, alert, in no acute respiratory distress.  Head Exam: Normal.   Eye Exam: Normal external eye,  lids. SONIYA.  Ear Exam: Normal   external ears.  OroPharynx Exam: Dental hygiene normal, no signs of xerostomia  Thyroid Exam: No obvious nodules or enlargement   Chest/Respiratory Exam: Normal, comfortable, easy respirations   Cardiovascular Exam: normal color, circulation, No pedal edema.  Musculoskeletal Exam: full ROM of upper and lower extremities.  Skin: no rashes  Neurologic Exam: Nonfocal, no tremor. " Normal gait.  Psychiatric Exam: Alert - appropriate, normal affect                Assessment & Plan     1. Dermatitis  New rx  - triamcinolone (KENALOG) 0.1 % external cream; Apply topically 2 times daily  Dispense: 453 g; Refill: 0    2. Medial epicondylitis of elbow, left    Patient was given instruction on use of ice, rest, exercises and NSAIDs for pain and swelling.  Call or return to clinic as needed if these symptoms worsen or fail to improve as anticipated.    3. Dysthymia  Dos not need refills at this time    4. Insomnia, unspecified type  discussed  - gabapentin (NEURONTIN) 100 MG capsule; Take 2 capsules (200 mg) by mouth At Bedtime  Dispense: 60 capsule  - traZODone (DESYREL) 50 MG tablet; Take 2 tablets (100 mg) by mouth At Bedtime  Dispense: 60 tablet    6. Alcohol dependence in early full remission (H)  Discussed ongoing group    Will be seeing old PCP 1 more time for a wellness exam and then     Return in about 1 year (around 9/14/2021) for Physical, or as needed if today's problem persists.    Jhon Vang MD  [facility]

## 2020-09-17 ENCOUNTER — RECORDS - HEALTHEAST (OUTPATIENT)
Dept: LAB | Facility: CLINIC | Age: 70
End: 2020-09-17

## 2020-09-17 ENCOUNTER — TRANSFERRED RECORDS (OUTPATIENT)
Dept: HEALTH INFORMATION MANAGEMENT | Facility: CLINIC | Age: 70
End: 2020-09-17

## 2020-09-17 LAB
ALBUMIN SERPL-MCNC: 4.1 G/DL (ref 3.5–5)
ALP SERPL-CCNC: 85 U/L (ref 45–120)
ALT SERPL W P-5'-P-CCNC: 20 U/L (ref 0–45)
ANION GAP SERPL CALCULATED.3IONS-SCNC: 9 MMOL/L (ref 5–18)
AST SERPL W P-5'-P-CCNC: 23 U/L (ref 0–40)
BILIRUB SERPL-MCNC: 0.6 MG/DL (ref 0–1)
BUN SERPL-MCNC: 18 MG/DL (ref 8–28)
CALCIUM SERPL-MCNC: 8.8 MG/DL (ref 8.5–10.5)
CHLORIDE BLD-SCNC: 105 MMOL/L (ref 98–107)
CHOLEST SERPL-MCNC: 179 MG/DL
CO2 SERPL-SCNC: 24 MMOL/L (ref 22–31)
CREAT SERPL-MCNC: 1.17 MG/DL (ref 0.7–1.3)
FASTING STATUS PATIENT QL REPORTED: YES
GFR SERPL CREATININE-BSD FRML MDRD: >60 ML/MIN/1.73M2
GLUCOSE BLD-MCNC: 89 MG/DL (ref 70–125)
HDLC SERPL-MCNC: 45 MG/DL
LDLC SERPL CALC-MCNC: 116 MG/DL
POTASSIUM BLD-SCNC: 4.5 MMOL/L (ref 3.5–5)
PROT SERPL-MCNC: 6.7 G/DL (ref 6–8)
PSA SERPL-MCNC: 2 NG/ML (ref 0–6.5)
SODIUM SERPL-SCNC: 138 MMOL/L (ref 136–145)
TRIGL SERPL-MCNC: 91 MG/DL

## 2020-09-22 ENCOUNTER — TRANSFERRED RECORDS (OUTPATIENT)
Dept: HEALTH INFORMATION MANAGEMENT | Facility: CLINIC | Age: 70
End: 2020-09-22

## 2021-02-22 ENCOUNTER — TELEPHONE (OUTPATIENT)
Dept: FAMILY MEDICINE | Facility: CLINIC | Age: 71
End: 2021-02-22

## 2021-02-22 DIAGNOSIS — G47.00 INSOMNIA, UNSPECIFIED TYPE: ICD-10-CM

## 2021-02-22 RX ORDER — TRAZODONE HYDROCHLORIDE 50 MG/1
TABLET, FILM COATED ORAL
Qty: 360 TABLET | Refills: 0 | Status: SHIPPED | OUTPATIENT
Start: 2021-02-22 | End: 2021-06-28

## 2021-02-22 NOTE — TELEPHONE ENCOUNTER
PT wanted refill from this Dr. he has switched Docs.     He was very upset because he had to wait 10 min on hold nad couldn't talk to the Dr. Please call the PT at home ay earliest convience.

## 2021-02-22 NOTE — TELEPHONE ENCOUNTER
Calling about Trazadone refill he needs 50 mg filled 3  Tablets plus I more tablet as needed at bedtime before 2 am to [facility].\      Thanks  Agata Francis  TC

## 2021-02-22 NOTE — TELEPHONE ENCOUNTER
JF,   Patient requesting new Rx for Trazodone  Listed as historical  Patient states he takes 2-3 at HS and then one in the middle of the night if needs to   Wrote Rx with those directions if you approve  Thanks,  Chrissy PLAZA RN

## 2021-02-22 NOTE — TELEPHONE ENCOUNTER
Left message for patient to call [facility] back  When patient calls back please transfer to SAUL PLAZA RN

## 2021-03-17 DIAGNOSIS — M77.02 MEDIAL EPICONDYLITIS OF ELBOW, LEFT: Primary | ICD-10-CM

## 2021-03-18 RX ORDER — IBUPROFEN 800 MG/1
TABLET, FILM COATED ORAL
Qty: 90 TABLET | Refills: 3 | Status: SHIPPED | OUTPATIENT
Start: 2021-03-18 | End: 2022-08-29

## 2021-03-18 NOTE — TELEPHONE ENCOUNTER
JF    Ibuprofen 800 mg      Last Written Prescription Date:  ?  Last Fill Quantity: ?,   # refills: ?  Last Office Visit: 9/14/20  Future Office visit:       Routing refill request to provider for review/approval because:  Medication is reported/historical    Please approve if appropriate  Kayla Hall RN

## 2021-03-27 ENCOUNTER — HEALTH MAINTENANCE LETTER (OUTPATIENT)
Age: 71
End: 2021-03-27

## 2021-04-27 DIAGNOSIS — G47.00 INSOMNIA, UNSPECIFIED TYPE: ICD-10-CM

## 2021-04-27 RX ORDER — GABAPENTIN 100 MG/1
CAPSULE ORAL
Qty: 60 CAPSULE | Refills: 5 | Status: SHIPPED | OUTPATIENT
Start: 2021-04-27 | End: 2022-03-31

## 2021-04-27 NOTE — TELEPHONE ENCOUNTER
Routing refill request to provider for review/approval because:  Drug not on the [facility] refill protocol   Medication is reported/historical  Chrissy PLAZA RN

## 2021-04-29 ENCOUNTER — MYC MEDICAL ADVICE (OUTPATIENT)
Dept: FAMILY MEDICINE | Facility: CLINIC | Age: 71
End: 2021-04-29

## 2021-05-26 ASSESSMENT — PATIENT HEALTH QUESTIONNAIRE - PHQ9
SUM OF ALL RESPONSES TO PHQ QUESTIONS 1-9: 15
SUM OF ALL RESPONSES TO PHQ QUESTIONS 1-9: 16

## 2021-05-27 DIAGNOSIS — E78.5 HYPERLIPIDEMIA: ICD-10-CM

## 2021-05-27 DIAGNOSIS — F34.1 DYSTHYMIA: Primary | ICD-10-CM

## 2021-05-27 DIAGNOSIS — I10 ESSENTIAL HYPERTENSION: ICD-10-CM

## 2021-05-27 RX ORDER — CITALOPRAM HYDROBROMIDE 40 MG/1
40 TABLET ORAL DAILY
Qty: 90 TABLET | Refills: 0 | Status: SHIPPED | OUTPATIENT
Start: 2021-05-27 | End: 2021-11-17

## 2021-05-27 RX ORDER — PRAVASTATIN SODIUM 40 MG
40 TABLET ORAL DAILY
Qty: 90 TABLET | Refills: 0 | Status: SHIPPED | OUTPATIENT
Start: 2021-05-27 | End: 2021-08-23

## 2021-05-27 RX ORDER — CITALOPRAM HYDROBROMIDE 40 MG/1
40 TABLET ORAL DAILY
Qty: 30 TABLET | Refills: 0 | Status: SHIPPED | OUTPATIENT
Start: 2021-05-27 | End: 2021-05-27

## 2021-05-27 NOTE — TELEPHONE ENCOUNTER
? End of August/Early September is 3 months from now.  Come in then.    We just have to see him once a year  Last time he was here was 9/14/2020

## 2021-05-27 NOTE — TELEPHONE ENCOUNTER
JF  Patient calling in requesting below     pravastatin      Last Written Prescription Date:  2/25/21  Last Fill Quantity: 90,   # refills: 0  Last Office Visit: 9/14/21  Future Office visit:       Routing refill request to provider for review/approval because:  Prescribed by previous PCP who has retired    Citalopram 40 mg      Last Written Prescription Date:  2/23/21  Last Fill Quantity: 90,   # refills: 0  Future Office visit:       Routing refill request to provider for review/approval because:  Prescribed by previous PCP who has retired    Thank you,  Kayla Hall, RN

## 2021-05-27 NOTE — TELEPHONE ENCOUNTER
JF,  Informed pt below.    Pt upset. He says he had a physical 9/17/20 at previous clinic and was told info was faxed here several times in the last year.    I called [facility] [phone] and they faxed physical here- it is on your desk.    Stat scanning is behind ~ 3 weeks.    He is wanting to wait until he is due to come back in if possible.    Please advise.  Thanks,  Carmina Milton RN

## 2021-05-27 NOTE — TELEPHONE ENCOUNTER
Spoke with patient and relayed messages below. Verbalized understanding. Told patient medications have been sent to pharmacy    Shanika Palafox RN   [facility]

## 2021-05-28 ASSESSMENT — ANXIETY QUESTIONNAIRES
GAD7 TOTAL SCORE: 7
GAD7 TOTAL SCORE: 8

## 2021-05-30 NOTE — PROGRESS NOTES
"Intake Note:     D) Ezio Bassett is a 68 y.o.   White or  male who is referred to EOP via [facility] with funding from Medicare A&B. Patient orientated x 3. Patient meets criteria for Alcohol Use Disorder, severe (F10.20).  Patient appears appropriate for EOP.   A) Met with patient for 80 minutes.  Completed intake assessment and preliminary paperwork. Patient was given and explained counselor & supervisor license number and contact info, Patient Bill of Rights, program rules/regulations, Program Abuse Prevention Plan, confidentiality & HIPPA policies, grievance procedure, presented ROIs, TB & HIV/AIDS policies & resources, and Vulnerable Adult policy.   Conducted Vulnerable Adult Assessment.   R) No special Vulnerable Adult needed at this time.  Patient signed and agreed to counselor & supervisor license number and contact info, Patient Bill of Rights, group rules/regulations, Program Abuse Prevention Plan, confidentiality & HIPPA policies, grievance procedure,  ROIs, TB & HIV/AIDS policies & resources, and Vulnerable Adult policy. Patient scored Low on C-SSRS screen. Patient denied suicidal ideation/intent/plan/means at this time.     Opioid Use Disorder: No   Provided \"Options for Opioid Treatment in [place] and Overdose Prevention\" No     Dimension #1 - Withdrawal Potential - Risk 1. Patient reports his DOC as alcohol and his last use was on 7/21/2019.     Dimension #2 - Biomedical - Risk 1. Patient reports stable health. Patient has a primary care provider at [facility]. Patient is able to seek cares as needed.    Dimension #3 - Emotional , Behavioral and Cognitive - Risk 2. Patient reports diagnosis of depression and anxiety. Patient receives his psychotropic medications from his primary physician.    Dimension #4 - Readiness for Change - Risk 1. Patient verbalizes a desire and readiness for change. Patient has no legal involvement.    Dimension #5 - Relapse " Potential - Risk 3. Patient lacks healthy, positive coping skills. Patient lacks skills to prevent relapse. Patient does not have significant periods of sobriety. Patient has one previous treatment episode.    Dimension #6 - Recovery Environment - Risk 2. Patient is retired. Patient has stable housing. Patient has support from two brothers and roommate, however, no current sober community involvement.      T) Explained counselor & supervisor license number and contact info, Patient Bill of Rights, program rules/regulations, Program Abuse Prevention Plan, confidentiality & HIPPA policies, grievance procedure, presented ROIs, TB & HIV/AIDS policies & resources, and Vulnerable Adult policy. Patient expected to start group on 7/23/20109.      Dari Dillard  7/22/2019, 5:26 PM

## 2021-05-30 NOTE — PROGRESS NOTES
Ezio Bassett attended 3 hours of group today.     The group topic was Acceptance, patient was responsive to topic.     Patients engagement in the group session: high     Total group size: 3      Dari Dillard  7/29/2019, 8:51 PM

## 2021-05-30 NOTE — PROGRESS NOTES
Ezio Bassett attended 2 hours of group today.     The group topic was Acceptance, patient was responsive to topic.     Patients engagement in the group session: high     Total group size: 2      Dari Dillard  7/30/2019, 8:33 PM

## 2021-05-30 NOTE — PROGRESS NOTES
Ezio Bassett attended 3 hours of group today.     The group topic was Sober Structure, patient was responsive to topic.     Patients engagement in the group session: high     Total group size: 3      Dari Dillard  7/25/2019, 8:58 PM

## 2021-05-30 NOTE — PROGRESS NOTES
!x1 Counseling session 4:05-4:55pm (50 min)    Met with the patient to update on his request to transfer to a daytime IOP more suited for his age group. Patient was informed that there is one available spot open in the [facility] facilitated by Lydia Strange and that his progress note reflects the transfer to begin Friday of this week, 8/2/19. Patient appeared surprised, and verbalized his gratitude for this writer assisting him with his concerns. Patient indicated [facility] and that [facility] will return on Friday for [facility]. Patient also reported that he can probably drive his car to [facility] since it will be during the early part of the day and he will not have to be concerned about evening rush hour all aver the [facility]. Patient appeared more relaxed than he was yesterday in EOP group. Patient also reported that Dr. Oneil informed him that she did not need to see him since he was transferred from 2700. Writer will staff this for future reference with his new counselor Tho. The patient reports he has been working on some of the homework he was given by this writer to stay focused on his sobriety efforts. Patient indicated that he is feeling more at ease not drinking this week than he was last week. Patient was encouraged to reach out to his new counselor with any concerns or issues he may have going forward while in [facility]. He was given her information and a flyer with his weekly group schedule.

## 2021-05-30 NOTE — PROGRESS NOTES
Ezio Bassett attended 3 hours of group today.     The group topic was Sober Structure, patient was responsive to topic.     Patients engagement in the group session: high     Total group size: 4      Dari Dillard  7/23/2019, 9:15 PM
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
## 2021-05-30 NOTE — PROGRESS NOTES
Addiction Services - Initial Services Plan     Patient  Name: Ezio Bassett  MRN: [id number]   : 1950  Admit Date: 2019       Patient describes their immediate need: To learn recovery skills to prevent relapse.    Are there any immediate Safety Needs such as (physical, stability, mobility):  Pt is able to get medical care as needed. Pt denies immediate concerns.     Immediate Health Needs and Plan:   None    Vulnerable Adult: No     Issues to be addressed in the first sessions:   Orientation to program    Patient strengths and needs:   Strengths identified as very logical, science fact based, very organized.   Needs identified as friends to go out with, companionship, I find socializing difficult due to hearing issues.    Plan for patient for time between intake and completion of the treatment plan:   Attend all group therapy sessions as directed, complete all written and oral assignments as directed, and remain clean and sober. A relapse, if any, must be reported to staff immediately in order to ensure you are receiving the proper level of care. If you cannot attend a group therapy session you must call contact information provided in intake folder and leave a message before or during group hours.         Vulnerable Adult Review   [X] Review of the Facility Abuse Prevention plan was reviewed with the patient   [X] No Individual Abuse Plan is necessary   [ ] In addition to the Facility Abuse Prevention plan, an Individual Abuse Plan will be put in place       Staff Name/Title: Dari Dillard   Date: 2019  Time: 3:02 PM
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
## 2021-05-31 NOTE — PROGRESS NOTES
Ezio CYNTHIA Bassett attended 2 hours of group on 8/7/2019.     The group topic was 8 Dimensions of Wellness, patient was responsive to topic.     Patients engagement in the group session: medium     Total number of patients present 7.     Supervising MD: Dr. Thad Strange, [facility]  8/7/2019, 12:21 PM
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
## 2021-05-31 NOTE — PROGRESS NOTES
Ezio CYNTHIA Bassett attended 3 hours of group on 8/14/2019.     The group topic was Access to Care, patient was responsive to topic.     Patients engagement in the group session: medium     Total number of patients present 8.     Supervising MD: Dr. Thad Strange, [facility]  8/14/2019, 11:10 AM

## 2021-05-31 NOTE — PROGRESS NOTES
Ezio CYNTHIA Bassett attended 3 hours of group on 8/26/2019.     The group topic was Acceptance, patient was responsive to topic.     Patients engagement in the group session: medium     Total number of patients present 11.     Supervising MD: Dr. Thad Strange, [facility]  8/26/2019, 3:13 PM

## 2021-05-31 NOTE — TELEPHONE ENCOUNTER
Patient called and said that his insurance will cover naltrexone tablets at around $7.00/month co-pay. He said the vivitrol would be $300 and he cannot afford that. The alternatives that insurance recommended were narcan inj. And narcan spray. Explained to patient that these were not good alternatives to naltrexone. Patient will speak to Dr. Oneil at next appointment.
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
## 2021-05-31 NOTE — PROGRESS NOTES
Ezio MARIE Jaredgayle attended 3 hours of group on 8/9/2019.     The group topic was 8 Dimensions of Wellness, patient was responsive to topic.     Patients engagement in the group session: high     Total number of patients present 7.     Supervising MD: Dr. Jesi Strange, [facility]  8/9/2019, 12:15 PM

## 2021-05-31 NOTE — PROGRESS NOTES
Ezio CYNTHIA Bassett attended 3 hours of group on 8/16/2019.     The group topic was Access to Care, patient was responsive to topic.     Patients engagement in the group session: medium     Total number of patients present 11.     Supervising MD: Dr. Thad Strange, [facility]  8/16/2019, 2:56 PM
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
## 2021-05-31 NOTE — TELEPHONE ENCOUNTER
Client call re: naltrexone (DEPADE) 50 mg tablet and Vivitrol.  Per client ins will not cover these meds. Gave alt medications client wants to know   If the alt  Are as effective,. States if pos he would like to meet with    9/30 while he is in grp.

## 2021-05-31 NOTE — PROGRESS NOTES
Ezio MARIE Jaredgayle attended 3 hours of group on 8/2/2019.     The group topic was Grief and Loss, patient was responsive to topic.     Patients engagement in the group session: medium     Total number of patients present 8.     Supervising MD: Dr. Jesi Strange, [facility]  8/2/2019, 1:58 PM

## 2021-05-31 NOTE — PROGRESS NOTES
Ezio MARIE Jaredgayle attended 3 hours of group on 8/23/2019.     The group topic was Emotional Wellbeing, patient was responsive to topic.     Patients engagement in the group session: medium     Total number of patients present  9.     Supervising MD: Dr. Jesi Strange, [facility]  8/23/2019, 1:59 PM

## 2021-05-31 NOTE — PROGRESS NOTES
Ezio MARIE Jaredgayle attended 3 hours of group on 8/19/2019.     The group topic was Emotional Wellbeing, patient was responsive to topic.     Patients engagement in the group session: medium     Total number of patients present 9.     Supervising MD: Dr. Thad tSrange, [facility]  8/19/2019, 12:20 PM

## 2021-05-31 NOTE — PROGRESS NOTES
Ezio MARIE Jaredgayle attended 3 hours of group on 8/30/2019.     The group topic was Acceptance, patient was responsive to topic.     Patients engagement in the group session: medium     Total number of patients present 9.     Supervising MD: Dr. Jesi Strange, [facility]  8/30/2019, 11:58 AM

## 2021-05-31 NOTE — PROGRESS NOTES
Ezio CYNTHIA Bassett attended 3 hours of group on 8/28/2019.     The group topic was Acceptance, patient was responsive to topic.     Patients engagement in the group session: medium     Total number of patients present 10.     Supervising MD: Dr. Thad Strange, [facility]  8/28/2019, 2:25 PM

## 2021-05-31 NOTE — PROGRESS NOTES
Ezio CYNTHIA Bassett attended 3 hours of group on 8/12/2019.     The group topic was Access to Care, patient was responsive to topic.     Patients engagement in the group session: medium     Total number of patients present 8.     Supervising MD: Dr. Thad Strange, [facility]  8/12/2019, 2:13 PM

## 2021-05-31 NOTE — PROGRESS NOTES
Correct pharmacy verified with patient and confirmed in snapshot? [x] yes []no    Charge captured ? [x] yes  [] no    Medications Phoned  to Pharmacy [] yes [x]no  Name of Pharmacist:  List Medications, including dose, quantity and instructions      Medication Prescriptions given to patient   [] yes  [x] no   List the name of the drug the prescription was written for.       Medications ordered this visit were e-scribed.  Verified by order class [x] yes  [] no naltrexone 50mg    Medication changes or discontinuations were communicated to patient's pharmacy: [] yes  [x] no    UA collected [x] yes  [] no    [place] Prescription Monitoring Program Reviewed? [x] yes  [] no    Referrals were made to: no      Future appointment was made: [] yes  [x] no    Dictation completed at time of chart check: [x] yes  [] no    I have checked the documentation for today s encounters and the above information has been reviewed and completed.

## 2021-05-31 NOTE — PROGRESS NOTES
Ezio CYNTHIA Bassett attended 3 hours of group on 8/5/2019.     The group topic was 8 Dimensions of Wellness, patient was responsive to topic.     Patients engagement in the group session: medium     Total number of patients present 7.     Supervising MD: Dr. Thad Strange, [facility]  8/5/2019, 2:32 PM

## 2021-06-01 NOTE — PROGRESS NOTES
Ezio CYNTHIA Bassett attended 3 hours of group on 9/25/2019.     The group topic was Relapse Prevention, patient was responsive to topic.     Patients engagement in the group session: low     Total number of patients present 9.     Supervising MD: Dr. Thad Strange, [facility]  9/25/2019, 3:47 PM

## 2021-06-01 NOTE — PROGRESS NOTES
Ezio CYNTHIA Bassett attended 2 hours of group on 9/23/2019.     The group topic was Relapse Prevention, patient was responsive to topic.     Patients engagement in the group session: medium     Total number of patients present 12.     Supervising MD: Dr. Thad Strange, [facility]  9/23/2019, 2:07 PM

## 2021-06-01 NOTE — PROGRESS NOTES
Ezio MARIE Jaredgayle attended 3 hours of group on 9/18/2019.     The group topic was Sober Structure, patient was responsive to topic.     Patients engagement in the group session: medium     Total number of patients present 4.     Supervising MD: Dr. Thad Strange, [facility]  9/18/2019, 1:58 PM

## 2021-06-01 NOTE — PROGRESS NOTES
Ezio MARIE Jaredgayle attended 3 hours of group on 9/16/2019.     The group topic was Sober Structure, patient was responsive to topic.     Patients engagement in the group session: medium     Total number of patients present 8.     Supervising MD: Dr. Thad Strange, [facility]  9/16/2019, 3:29 PM

## 2021-06-01 NOTE — TELEPHONE ENCOUNTER
Ezio called and spoke to him about Dr. Brunner's note about seeing his primary MD on the aching because it could be something not related to the medications.  Also, he said he had not talked about a Naloxone Spray, but the Narcan nasal spray.  He was appreciative of the phone call.
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
## 2021-06-01 NOTE — TELEPHONE ENCOUNTER
Message left for Ezio to call on Dr. Brunner's directive, or stop by the clinic tomrrow if he is here for programming, and have Lydia find Candy for him.

## 2021-06-01 NOTE — PROGRESS NOTES
Ezio MARIE Jaredgayle attended 3 hours of group on 9/4/2019.     The group topic was Relationships, patient was responsive to topic.     Patients engagement in the group session: medium     Total number of patients present 10.     Supervising MD: Dr. Jesi Strange, [facility]  9/4/2019, 2:58 PM

## 2021-06-01 NOTE — PROGRESS NOTES
Correct pharmacy verified with patient and confirmed in snapshot? [x] yes []no    Charge captured ? [x] yes  [] no    Medications Phoned  to Pharmacy [] yes [x]no  Name of Pharmacist:  List Medications, including dose, quantity and instructions      Medication Prescriptions given to patient   [] yes  [x] no   List the name of the drug the prescription was written for.       Medications ordered this visit were e-scribed.  Verified by order class [x] yes  [] no  Gabapentin  Medication changes or discontinuations were communicated to patient's pharmacy: [x] yes  [] no  Called [facility] and d/c'd  Remeron  UA collected [x] yes  [] no    [place] Prescription Monitoring Program Reviewed? [x] yes  [] no    Referrals were made to:  none    Future appointment was made: [x] yes  [] no  9/23/19  Dictation completed at time of chart check: [x] yes  [] no    I have checked the documentation for today s encounters and the above information has been reviewed and completed.

## 2021-06-01 NOTE — PROGRESS NOTES
Ezio MARIE Jaredgayle attended 3 hours of group on 9/30/2019.     The group topic was Grief and Loss, patient was responsive to topic.     Patients engagement in the group session: medium     Total number of patients present 8.     Supervising MD: Dr. Thad Strange, [facility]  9/30/2019, 11:54 AM
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
## 2021-06-01 NOTE — PROGRESS NOTES
Ezio MARIE Jaredgayle attended 3 hours of group on 9/20/2019.     The group topic was Sober Structure, patient was responsive to topic.     Patients engagement in the group session: low     Total number of patients present 10.     Supervising MD: Dr. Jesi Strange, [facility]  9/20/2019, 2:14 PM
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
## 2021-06-01 NOTE — PROGRESS NOTES
Ezio MARIE Jaredgayle attended 3 hours of group on 9/27/2019.     The group topic was Relapse Prevention, patient was responsive to topic.     Patients engagement in the group session: medium     Total number of patients present 11.     Supervising MD: Dr. Jesi Strange, [facility]  9/27/2019, 2:25 PM

## 2021-06-01 NOTE — PROGRESS NOTES
1x1    The patient and this  met for 50 minutes to discuss patient concerns and goals. The patient reported that the topic dicussed in group (Maslow Hierarchy of Needs) really resignated with him in regards to his need for social and esteem needs from people and peers. The patient reports that he has always felt like there is something wrong with him and has felt a sense of disbelonging. He reports that he straggles with engaging with others due to feeling like an outcast. He reports that he would like to further explore these things and will bring them up to his therapist at their next meeting this week. He also identified that he at times feels like he is spending too much time on social media reporting that it may be leading to depression and a lack of motivation along with an increase in eating sweets. He identified that he is working to find balance. It was suggested that he look into a social media emy timer. No SI/SIB/HI at time of encounter. Plan is to continue in SRP with a drop down to phase II in the coming week(s).     INA Jernigan 9/16/2019 3:35 PM

## 2021-06-01 NOTE — PROGRESS NOTES
Ezio MARIE Jaredgayle attended 3 hours of group on 10/2/2019.     The group topic was Grief and Loss, patient was responsive to topic.     Patients engagement in the group session: medium     Total number of patients present 6.     Supervising MD: Dr. Thad Strange, [facility]  10/2/2019, 12:22 PM

## 2021-06-01 NOTE — PROGRESS NOTES
Ezio CYNTHIA Bassett attended 2 hours of group on 9/9/2019.     The group topic was Relationships, patient was responsive to topic.     Patients engagement in the group session: medium     Total number of patients present 9.     Supervising MD: Dr. Thad Strange, [facility]  9/9/2019, 2:36 PM

## 2021-06-01 NOTE — PROGRESS NOTES
Ezio MARIE Jaredgayle attended 3 hours of group on 9/6/2019.     The group topic was Relationships, patient was responsive to topic.     Patients engagement in the group session: medium     Total number of patients present 10.     Supervising MD: Dr. Jesi Strange, [facility]  9/6/2019, 3:19 PM

## 2021-06-01 NOTE — PROGRESS NOTES
Ezio MARIE Jaredgayle attended 3 hours of group on 9/13/2019.     The group topic was Relationships, patient was responsive to topic.     Patients engagement in the group session: medium     Total number of patients present 9.     Supervising MD: Dr. Jesi Strange, [facility]  9/13/2019, 3:19 PM

## 2021-06-01 NOTE — PROGRESS NOTES
Ezio CYNTHIA Bassett attended 3 hours of group on 9/11/2019.     The group topic was Relationships, patient was responsive to topic.     Patients engagement in the group session: medium     Total number of patients present 8.     Supervising MD: Dr. Thad Strange, [facility]  9/11/2019, 2:43 PM

## 2021-06-01 NOTE — PROGRESS NOTES
Correct pharmacy verified with patient and confirmed in snapshot? [x] yes []no    Charge captured ? [x] yes  [] no    Medications Phoned  to Pharmacy [] yes [x]no  Name of Pharmacist:  List Medications, including dose, quantity and instructions      Medication Prescriptions given to patient   [] yes  [x] no   List the name of the drug the prescription was written for.       Medications ordered this visit were e-scribed.  Verified by order class [] yes  [x] no    Medication changes or discontinuations were communicated to patient's pharmacy: [x] yes  [] no naltrexone 50mg, trazodone 100mg    UA collected [x] yes  [] no    [place] Prescription Monitoring Program Reviewed? [x] yes  [] no    Referrals were made to:  no    Future appointment was made: [x] yes  [] no    Dictation completed at time of chart check: [x] yes  [] no    I have checked the documentation for today s encounters and the above information has been reviewed and completed.
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
## 2021-06-02 NOTE — TELEPHONE ENCOUNTER
Returned Pt's phone calls regarding absences on 10/11 and 10/14 due to illness, and his intention to not return to group until Friday 10/18.  Spoke to Pt, encouraged him to return to group on Wed 10/16, if possible, so that he's able to get 2 groups in this week.  Pt answered that he will see how he feels and if symptom-free, he'll attend - otherwise, believes he will be OK by Friday 10/18 to return.
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
## 2021-06-02 NOTE — PROGRESS NOTES
Ezio Bassett attended 3 hours of group on 10/18/2019.     The group topic was Access to Care, patient was responsive to topic.     Patient's engagement in the group session: medium     Total number of patients present 11. INA Berger was present during our 3 hour group due to having more than 8 patients present today.       Supervising MD: Dr. Brunner Samantha S Peterson, LADC  10/18/2019, 2:38 PM

## 2021-06-02 NOTE — PROGRESS NOTES
Ezio MARIE Jaredgayle attended 3 hours of group on 10/25/2019.     The group topic was Emotional Wellbeing, patient was responsive to topic.     Patient's engagement in the group session: medium     Total number of patients present 10.     Supervising MD: Dr. Jesi Cartagena, [facility] was present during our 3 hour group due to having more than 8 patients present today.       Victorina Perry  10/25/2019, 1:53 PM

## 2021-06-02 NOTE — PROGRESS NOTES
Weekly:    The patient has not presented to group since 10/7/19 due to reporting illness. He is expected to return on 10/18/19. No weekly not will be entered as the patient was unable to be assessed.     INA Jernigan 10/17/2019 10:39 AM

## 2021-06-02 NOTE — PROGRESS NOTES
Ezio MARIE Jaredgayle attended 3 hours of group on 10/28/2019.     The group topic was Acceptance, patient was responsive to topic.     Patient's engagement in the group session: high     Total number of patients present 8.     Supervising MD: Dr. Thad Strange, [facility]  10/28/2019, 2:22 PM

## 2021-06-02 NOTE — PROGRESS NOTES
Ezio Bassett attended 3 hours of group on 10/21/2019.     The group topic was Emotional Wellbeing, patient was responsive to topic.     Patient's engagement in the group session: medium     Total number of patients present 10. INA Johnson was present during our 3 hour group due to having more than 8 patients present today.      Supervising MD: Dr. Brunner Samantha S Peterson, LADC  10/21/2019, 2:29 PM
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
## 2021-06-02 NOTE — PROGRESS NOTES
Ezio CYNTHIA Bassett attended 3 hours of group on 11/1/2019.     The group topic was Acceptance, patient was responsive to topic.     Patient's engagement in the group session: high     Total number of patients present 6.     Supervising MD: Dr. Jesi Perry  11/1/2019, 11:48 AM

## 2021-06-02 NOTE — PROGRESS NOTES
Ezio MARIE Jaredgayle attended 3 hours of group on 10/4/2019.     The group topic was Grief and Loss, patient was responsive to topic.     Patients engagement in the group session: medium     Total number of patients present 8.     Supervising MD: Dr. Jesi Strange, [facility]  10/4/2019, 2:41 PM

## 2021-06-03 VITALS
HEIGHT: 70 IN | DIASTOLIC BLOOD PRESSURE: 83 MMHG | HEART RATE: 61 BPM | BODY MASS INDEX: 28.49 KG/M2 | WEIGHT: 199 LBS | SYSTOLIC BLOOD PRESSURE: 133 MMHG

## 2021-06-03 VITALS
WEIGHT: 201 LBS | DIASTOLIC BLOOD PRESSURE: 94 MMHG | HEIGHT: 70 IN | HEART RATE: 63 BPM | SYSTOLIC BLOOD PRESSURE: 146 MMHG | BODY MASS INDEX: 28.77 KG/M2 | TEMPERATURE: 98.3 F

## 2021-06-03 VITALS — BODY MASS INDEX: 27.35 KG/M2 | WEIGHT: 191 LBS | HEIGHT: 70 IN

## 2021-06-03 VITALS — HEIGHT: 70 IN | BODY MASS INDEX: 27.2 KG/M2 | WEIGHT: 190 LBS

## 2021-06-03 NOTE — PROGRESS NOTES
Ezio MARIE Jaredgayle attended 2 hours of group on 11/12/2019.     The group topic was Processing, patient was responsive to topic.     Patient's engagement in the group session: medium     Total number of patients present 6.     Supervising MD: Dr. Thad Strange, [facility]  11/12/2019, 11:48 AM
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
## 2021-06-03 NOTE — PROGRESS NOTES
Ezio CYNTHIA Bassett attended 2 hours of group on 11/19/2019.     The group topic was Maintenance, patient was responsive to topic.     Patient's engagement in the group session: medium     Total number of patients present 6.     Supervising MD: Dr. Thad Strange, [facility]  11/19/2019, 11:55 AM

## 2021-06-03 NOTE — PROGRESS NOTES
Ezio MARIE Serjio attended 2 hours of group on 11/26/2019.     The group topic was Holiday Relapse Prevention, patient was responsive to topic.     Patient's engagement in the group session: medium     Total number of patients present 6.     Supervising MD: Dr. Thad Strange, [facility]  11/26/2019, 2:00 PM
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
## 2021-06-03 NOTE — PROGRESS NOTES
Ezio Bassett attended 3 hours of group on 11/4/2019.     The group topic was Relationships, patient was responsive to topic.     Patient's engagement in the group session: high     Total number of patients present 6.     Supervising MD: Dr. Thad Perry  11/4/2019, 12:10 PM

## 2021-06-03 NOTE — PROGRESS NOTES
Ezio Bassett attended 3 hours of group on 11/8/2019.     The group topic was Relationships, patient was responsive to topic.     Patient's engagement in the group session: high     Total number of patients present 9.     Victorina Perry MA, LPCC, LADC was present in group to accommodate the number of group members present.     Supervising MD: Dr. Jesi Perry  11/8/2019, 11:55 AM
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
## 2021-06-04 NOTE — PROGRESS NOTES
Ezio MARIE Jaredgayle attended 2 hours of group on 12/17/2019.     The group topic wasEmotions/Holiday prep,patient was responsive to topic.     Patient's engagement in the group session: high     Total number of patients present 8.     Counselor(s) present: Cydney Arriaga, [facility]    Supervising MD: INA Baldwin  12/17/2019, 11:36 AM
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
## 2021-06-04 NOTE — PROGRESS NOTES
Ezio MARIE Jaredgayle attended 2 hours of group on 12/10/2019.     The group topic was processing and support, patient was responsive to topic.     Patient's engagement in the group session: high     Total number of patients present 7.     Supervising MD: Dr. Thad Haney, [facility]  12/10/2019, 10:42 AM
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
## 2021-06-04 NOTE — PROGRESS NOTES
Ezio MARIE Jaredgayle attended 2 hours of group on 12/31/2019.     The group topic was processing, patient was responsive to topic.     Patient's engagement in the group session: high     Total number of patients present 8.     Counselor(s) present: INA Jernigan      Supervising MD: INA Horne  12/31/2019, 1:41 PM

## 2021-06-04 NOTE — PROGRESS NOTES
Ezio Bassett attended 2 hours of group on 12/24/2019.     The group topic was processing, holiday challenges, patient was responsive to topic.     Patient's engagement in the group session: high     Total number of patients present 5.     Counselor(s) present: Cydney Arriaga, [facility]    Supervising MD: Dr. Brunner Margaret Berry, INA  12/24/2019, 10:41 AM
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
## 2021-06-05 NOTE — PROGRESS NOTES
Ezio MARIE Jaredgayle attended 2 hours of group on 1/21/2020.     The group topic was Relapse Prevention, patient was responsive to topic.     Patient's engagement in the group session: high     Total number of patients present 8.     Counselor(s) present: MARY Tuttle, [facility] and INA Alba    Supervising MD: Dr. Thad Pearson  1/21/2020, 10:37 AM

## 2021-06-05 NOTE — PROGRESS NOTES
Ezio CYNTHIA Bassett attended 2 hours of group on 2/4/2020.     The group topic was processing, patient was responsive to topic.     Patient's engagement in the group session: medium     Total number of patients present 5.     Counselor(s) present: INA Jernigan     Supervising MD: INA Horne  2/4/2020, 1:22 PM
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
## 2021-06-05 NOTE — PROGRESS NOTES
Ezio MARIE Jaredgayle attended 2 hours of group on 1/7/2020.     The group topic was group processing, patient was responsive to topic.     Patient's engagement in the group session: medium     Total number of patients present 9.     Counselor(s) present: MARY Tuttle, [facility] and INA Vo    Supervising MD: INA Horne  1/7/2020, 2:05 PM
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
## 2021-06-05 NOTE — PROGRESS NOTES
Ezio MARIE Jaredgayle attended 2 hours of group on 1/28/2020.     The group topic was spirituality , patient was responsive to topic.     Patient's engagement in the group session: medium     Total number of patients present 7.     Counselor(s) present: MARY Tuttle, [facility] and INA Vo    Supervising MD: INA Horne  1/28/2020, 12:00 PM
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
## 2021-06-05 NOTE — PROGRESS NOTES
Ezio MARIE Jaredgayle attended 2 hours of group on 1/14/2020.     The group topic was gifts of recovery , patient was responsive to topic.     Patient's engagement in the group session: high     Total number of patients present 7.     Counselor(s) present: INA Jernigan     Supervising MD: INA Horne  1/14/2020, 11:58 AM

## 2021-06-16 NOTE — TELEPHONE ENCOUNTER
Telephone Encounter by Candy Gilbert RN at 9/4/2019 11:24 AM     Author: Candy Gilbert RN Service: Psychiatry Author Type: Registered Nurse    Filed: 9/4/2019 11:51 AM Encounter Date: 9/4/2019 Status: Addendum    : Candy Gilbert RN (Registered Nurse)    Related Notes: Original Note by Candy Gilbert RN (Registered Nurse) filed at 9/4/2019 11:49 AM       Ezio Freeman's MI/CD counselor, asked this writer to meet with Ezio, as he is thinking about stopping the Naltrexone and the Mirtazapine, as he cannot handle the tiredness and body aches.  He said the tiredness is causing him to be so tired Sunday and Monday, that he awoke in am and went right back to sleep until 1400.  He said besides that, he has had terrible body aching, to the point, that he has had to take his Ibuprofen 800 mg, three times a day.  He wants to stop taking the Mirtazapine 7.5 mg and Naltrexone 50 mg.  He believes they are the culprit.  We did education on several things:    1.  He should take the medication as ordered until getting further directive from Dr. Oneil or Dr. Brunner.  He said he understood, but would not say yes or no that he definitely would not stop one or both.    2.  Take some food 15 minutes before taking the Trazodone, and discussed what type of food, so that it would activate the acid in his stomach, to breakdown the Trazodone.  He understood above, and will take food 15 minutes before the Trazodone.  In the past it has taken him 1 hour to fall asleep, and awaken 2-4 times a night, so he will see if this improves how he sleeps.    3.  Melatonin, educated on Dr. Oneil's past education to this writer, that sometimes taking a dose at supper can start the Melatonin producing early and seeing he has these other medications at HS to help with sleep, maybe just taking the Melatonin at supper and not an additional dose at HS may not be needed.  He said he will take the Melatonin just at supper  and see how that works.    4.  Instructed that if he is taking Ibuprofen 800 mg three times a day, that if needs to be no more than that in a 24 hour period, and no less than every 8 hours.      5.  He was educated that the current dose of Mirtazapine of 7.5 mg, probably helps sleep the most, that higher doses usually are less effective in treating sleep, but that the Mirtazapine also will help with anxiety and sleep.    6.  He said Dr. Oneil told him the Vivitrol would probably cause less sedation than the Naltrexone, but insurance-wise, he cannot afford the Vivitrol.  He was told that Dr. Oneil was looking at Acamprosate, but he thinks the insurance is the one that said it has more side effects and is not very effective.  I told him that I found that interesting, as I thought that Naltrexone needed more watching as far as things like liver function, and that in the past patients had found the Acamprosate to be very effective.  He did say he would like identification that he is on Naltrexone, in case he ended up in the ED.  I showed him what we had, but said we worry about it more with the Vivitrol, as it stays in the system for 30 days, and he was shown the bracelet, necklace, and card for wallet.  He took all three, as he is unsure if he will end up on the Vivitrol, but was told not to wear ones with Vivitrol, as he is not on that.  He said he would not.  He was encouraged to write on the billfold one that he is on Naltrexone oral, not Vivitrol.  A side effect of the Naltrexone is the body aching, and less so with Mirtazapine.      He is aware this information would be sent off to Dr. Brunner, in case she would consider changes before the appointment on 09/09/2019, otherwise Dr. Oneil would also get this information for appointment on 09/09/2019.      August 29, 2019   Mylene Robertson MD   to Kayla Licona, RN            2:16 PM   Agree with the explanation.  Patient has been experiencing  drowsiness from oral naltrexone.  I would like to see him back for follow-up and we can try Campral instead of naltrexone if he wishes.              10:44 AM   Kayla Licona, RN routed this conversation to Mylene Robertson MD Daigle, Leah, RN           10:41 AM   Note      Patient called and said that his insurance will cover naltrexone tablets at around $7.00/month co-pay. He said the vivitrol would be $300 and he cannot afford that. The alternatives that insurance recommended were narcan inj. And narcan spray. Explained to patient that these were not good alternatives to naltrexone. Patient will speak to Dr. Oneil at next appointment.

## 2021-06-27 NOTE — PROGRESS NOTES
Progress Notes by Mylene Robertson MD at 7/30/2019  3:15 PM     Author: Mylene Robertson MD Service: -- Author Type: Physician    Filed: 7/30/2019  5:49 PM Encounter Date: 7/30/2019 Status: Signed    : Mylene Robertson MD (Physician)       Addiction Medicine  Outpatient Visit  With Dr. Jaramillo    7/30/2019    Ezio Bassett, 1950.    Subjective   The patient is a 68 year old male who presents for evaluation for appropriateness for IOP at [facility] for treatment of severe alcohol use disorder in early remission.  Patient had recently completed 3 weeks of inpatient intensive detoxification and rehabilitation therapy at [facility] with discharge on July 11.  He wanted to get into outpatient programming right away but states that due to administrative barriers he had to wait for about 3 weeks  and reschedule appointments for evaluations.  He is frustrated with the procedure and states that this is in part of why he relapsed.  He relapsed for a week with several days after discharge with last alcohol intake on July 22. He denies developing any withdrawal symptoms and in fact stated that he  previously had completed detoxification without significant withdrawal symptoms or complications.  He does not have significant medical issues and has a medical provider at [facility] with whom he is following on a regular basis.  He does report new symptoms of ataxia and is wondering if this is something he should worry about.  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  Patient needs blood testing for further evaluation and if required neurology referral.  From mental health standpoint he has recurrent major depressive disorder and anxiety and has been through multiple antidepressant treatments without much success.  He also had a series of 12 ECTs in 2014 after  hospitalization at [facility].  He continues to report moderate symptoms of depression and anxiety without any significant symptoms such as thoughts of hurting self or others.  Patient has a stable housing, he reports determination to follow through with recommendations for outpatient treatment programming and is compliant with medications.  Patient medication dose and name: naltrexone 50mg daily     At this time, patient would like to : stay same  PHQ-9 score past n/a today 14  NATALY-7 score past n/a today 9   MYNOR:  None     Psychiatric history   Current psychiatrist:  none.medications prescribed by Dr. Siegel (PCP with [facility]), has followed for 40-45 years. Has seen a psychiatrist previously but was unimpressed by the limited engagement and talking, felt they only prescribed medications.   Current psychotherapist: Dr. Mandi Leyva, sees monthly  Current CM: none  Diagnosis: depression disorder   [facility]  Previous suicide attempts: none  Current medications: Citalopram and trazodone, zolpidem and lorazepam  Past medications trials: escitalopram, bupropion, venlafaxine, quetiapine (low dose), desvenlafaxine, duloxetine, fluoxetine. Has not combined antidepressants previously.   Past ECT: yes  - 2014  Past or current commitments: none     Per Dr. Cartwright ([facility]), 2014:     Sought medical help for depression approximately fifteen years ago. Has been on a multitude of psychotropic medications including Wellbutrin, Abilify, Cymbalta, Zoloft, Effexor and Paxil.   Previous psychiatric hospitalizations include two Adult Day Treatment admissions at [place] the last completed June 2013. No inpatient hospitalizations.  History of suicide attempts: denies   Current psychiatrist: Dr. Alexander at [facility]   Current psychologist: Jaquan Riddle       Diagnosis Date   ? HTN (hypertension) 10/6/2010      Medical History Date Comments   COPD (chronic obstructive pulmonary disease)  (HC)       Hypertension   patient denies, no meds   Depression, major, recurrent (HC)       Deafness in right ear       RVH (right ventricular hypertrophy)       Dupuytren's contracture of right hand       History of migraine headaches             Surgical History          Past Surgical History:   Procedure Laterality Date   ? SKIN BIOPSY       ? TONSILLECTOMY             Social History  Current living situation: in home with roommate   City and state of birth: [place]  Marital status:   Children: 1  Currently employed: retired.         Per Dr. Cartwright ([facility]), 2014:   Born and raised in [place]. Ezio Bassett is the middle of five children with two older sisters and two younger brothers, raised by  Mother, never knew his father, was  for five years,came out as being homosexual age [age],  , has one daughter thirty-six years old, estranged from his daughter, He is currently living with significant other of thirty years in a home he owns. .  Occupation: retired from   History of violence/assault: denies  Legal History: DUI ten years ago        Family History    Addiction: denies  Mental illness: denies  Suicide: denies     Prior to Admission Medications     Current Outpatient Medications:   ?  carboxymethylcellulose-glycerin (REFRESH OPTIVE) 0.5-0.9 % Drop, Administer 1 drop to both eyes 2 (two) times a day., Disp: , Rfl:   ?  cholecalciferol, vitamin D3, 1,000 unit tablet, Take 2,000 Units by mouth daily., Disp: , Rfl:   ?  citalopram (CELEXA) 40 MG tablet, Take 40 mg by mouth daily., Disp: , Rfl:   ?  cycloSPORINE (RESTASIS) 0.05 % ophthalmic emulsion, Administer 1 drop to both eyes 2 (two) times a day., Disp: , Rfl:   ?  ibuprofen (ADVIL,MOTRIN) 800 MG tablet, Take 800 mg by mouth as needed for pain., Disp: , Rfl:   ?  ipratropium (ATROVENT) 42 mcg (0.06 %) nasal spray, 1 spray into each nostril 3 (three) times a day., Disp: , Rfl:   ?  ketotifen (ZADITOR/ZYRTEC ITCHY EYES) 0.025 %  (0.035 %) ophthalmic solution, 1 drop 2 (two) times a day., Disp: , Rfl:   ?  melatonin 3 mg Tab tablet, Take 3 mg by mouth at bedtime as needed., Disp: , Rfl:   ?  mirtazapine (REMERON) 7.5 MG tablet, Take 1 tablet (7.5 mg total) by mouth at bedtime., Disp: 30 tablet, Rfl: 0  ?  multivitamin with minerals (THERA-M) 9 mg iron-400 mcg Tab tablet, Take 1 tablet by mouth daily., Disp: , Rfl:   ?  naltrexone (DEPADE) 50 mg tablet, Take 1 tablet (50 mg total) by mouth at bedtime., Disp: 30 tablet, Rfl: 5  ?  pravastatin (PRAVACHOL) 40 MG tablet, Take 40 mg by mouth every evening., Disp: , Rfl:   ?  traZODone (DESYREL) 150 MG tablet, Take 1 tablet (150 mg total) by mouth at bedtime. (Patient taking differently: Take 100 mg by mouth at bedtime.    ), Disp: , Rfl: 0         Allergies  No Known Allergies       Last UDS performed on today and was: pending    Last ETG performed on today and was: pending     UA collected today      Have you maintained sobriety from all substances?  If not what is your problem substance and frequency?  Yes, last alcoholic beverage 7/22/19      What does patient currently fills his/her day with? (working, volunteering, TV)  retired     Are you having any cravings?  From 0 to 5 (zero being none) grade your craving. What is the craving for?  4/5        Are you going to groups, if so where and how often? What group?  [facility] group. 3x wkly     Side effects:  Dry mouth denies Constipation denies     Do you follow with a primary care doctor? When was your last visit?   Dr Siegel      Patient reports main support person is:  Roommate, friends      Is patient currently experiencing depression or thoughts of self-harm?   3.5, denies SI/HI     Is patient having trouble with functioning because of worrying about things?  3.5     Are any of the people in your life expressing concern for you?    no     Is there anything you would like to ask your doctor about?   Transfer group sites         Social  "History:  Patient lives with roomate, in an house, attending [employer], retired     Patient has  obstacles to maintain sobriety.    Allergies:  Patient has no known allergies.      Medications:  Current Outpatient Medications   Medication Sig Dispense Refill   ? carboxymethylcellulose-glycerin (REFRESH OPTIVE) 0.5-0.9 % Drop Administer 1 drop to both eyes 2 (two) times a day.     ? cholecalciferol, vitamin D3, 1,000 unit tablet Take 2,000 Units by mouth daily.     ? citalopram (CELEXA) 40 MG tablet Take 40 mg by mouth daily.     ? cycloSPORINE (RESTASIS) 0.05 % ophthalmic emulsion Administer 1 drop to both eyes 2 (two) times a day.     ? ibuprofen (ADVIL,MOTRIN) 800 MG tablet Take 800 mg by mouth as needed for pain.     ? ipratropium (ATROVENT) 42 mcg (0.06 %) nasal spray 1 spray into each nostril 3 (three) times a day.     ? ketotifen (ZADITOR/ZYRTEC ITCHY EYES) 0.025 % (0.035 %) ophthalmic solution 1 drop 2 (two) times a day.     ? melatonin 3 mg Tab tablet Take 3 mg by mouth at bedtime as needed.     ? mirtazapine (REMERON) 7.5 MG tablet Take 1 tablet (7.5 mg total) by mouth at bedtime. 30 tablet 0   ? multivitamin with minerals (THERA-M) 9 mg iron-400 mcg Tab tablet Take 1 tablet by mouth daily.     ? naltrexone (DEPADE) 50 mg tablet Take 1 tablet (50 mg total) by mouth at bedtime. 30 tablet 5   ? pravastatin (PRAVACHOL) 40 MG tablet Take 40 mg by mouth every evening.     ? traZODone (DESYREL) 150 MG tablet Take 1 tablet (150 mg total) by mouth at bedtime. (Patient taking differently: Take 100 mg by mouth at bedtime.       )  0     No current facility-administered medications for this visit.          Review of Systems:  Patient  denies chest pain or shortness of breath. Patient is  not having fever or fatigue.       Physical Examination:  /82   Pulse 73   Ht 5' 10\" (1.778 m)   Wt 190 lb (86.2 kg)   BMI 27.26 kg/m    Physical Exam  There were no signs suggesting medication toxicity or impairment due to drug " or alcohol use. Patient (is/is not) overtly manipulative. Gait intact.      Mental Status Examination     Grooming, Dress & Hygeine  Yes No  Comments    Normal x   NAD, awake alert, oriented for time place and person    Attitude        Cooperative x       Mood    irritated, mildly depressed    Euthymic x       Affect        Full range x       Normal intensity x       Reactive x       Eye Contact        Normal amount x       Speech        Normal volume, rate and amount x       Verbal Fluency        Normal phonemic x       Normal semantic X       Fund of Knowledge        Average X       Thought Content    denies thoughts of hurting self or others    Normal X       Thought Processes    linear and logical    Normal X       Perceptions    no signs of psychosis    Normal X       Insight & Judgment    fair    Recognition of illness X       Readiness to change X       Taking steps to manage illness X          [place] Prescription Monitoring Program:  Reviewed, no worrisome activity was noted.    Summary of Diagnostic Studies:  Urine toxicology pending at the time this note was completed.      Assessment    1. Severe alcohol use disorder (H)    2. Recurrent major depressive disorder, in partial remission (H)    3. Alcohol-induced insomnia (H)        Diagnoses/Plan:    1. Severe alcohol use disorder (H)  Continue naltrexone 50 mg daily for at least one year.  Liver enzymes reviewed and are within normal limits last rechecked July 2019 by his primary care provider.  Liver enzymes may be checked once or twice yearly while on naltrexone.  -Status post 3 weeks of inpatient hospital based treatment for detoxification and rehabilitation therapy.  Despite a brief relapse after discharge from in-hospital treatment patient is appropriate for outpatient programming at [facility].  He is not in need of detoxification services, his medical issues are chronic and managed by his primary physician, his mental health is stable and  he has a psychotherapist which he sees regularly.  - Ethyl Glucuronide and Ethyl Sulfate, Urine, Quantitative ([facility] ETG/S); Standing  - DAM    (Drug Abuse 1+, Urine); Standing  - Ethyl Glucuronide and Ethyl Sulfate, Urine, Quantitative ([facility] ETG/S)  - DAM    (Drug Abuse 1+, Urine)    2. Recurrent major depressive disorder, in partial remission (H)  -Reviewed patient's psychotropic medications.  He can continue citalopram 40 mg daily without change    3. Alcohol-induced insomnia (H)  -Continue melatonin and Remeron at night without change     At least 40min, was spent in the care of this patient and >50% of this time was spent in face-to-face counseling and coordination of care.  Multiple records reviewed, patient received counseling and information about alcohol use disorder as a chronic brain disease, its correlation and leading to mental health and medical issues.  Discussed medication management, indications benefits versus risks and side effects.  Reviewed blood test results.

## 2021-06-27 NOTE — PROGRESS NOTES
Progress Notes by Dari Dillard at 2019  2:18 PM     Author: Dari Dillard Service: Addiction Care Author Type: Licensed Alcohol and Drug Counselor    Filed: 2019  2:37 PM Encounter Date: 2019 Status: Attested    : Dari Dillard (Licensed Alcohol and Drug Counselor) Cosigner: Mylene Robertson MD at 2019  4:40 PM    **Sensitive Note**    Attestation signed by Mylene Robertson MD at 2019  4:40 PM    .  Note reviewed and agree with above.                Addiction Services - EOP IndividualTreatment Plan     Patient  Name: Ezio Bassett  MRN: [id number]    : 1950  Admit Date:  19  Date of Treatment Plan: 19  Diagnoses: Alcohol Use Disorder, Severe (F10.20)    Dimension 1: Acute Intoxication/Withdrawal Potential, Risk level: 1  Problem Statement from Comprehensive Assessment:  Patient reports his DOC as alcohol and his last use was on 2019.     Problem: Patient reports date of last use to be on 19  Goal: Patient to maintain abstinence throughout outpatient treatment.   Must be reached to complete treatment? Yes  Methods/Strategies (must include amount and frequency):   1. Patient to report any substance/alcohol use to counselor to determine if any changes need to be made to address withdrawal symptoms.   2. Patient to complete any requested UAs.   Target Date: 10/23/2019  Completion Date:     Dimension 2: Biomedical Conditions/Complications, Risk level: 1  Problem Statement from Comprehensive Assessment:  Patient reports stable health. Patient has a primary care provider at [facility]. Patient is able to seek cares as needed.     Problem: The patient reports diagnoses of Dupuytren's Contracture on right hand, also, two torn rotator cuffs (both shoulders).   Goal: Patient to maintain stable health throughout outpatient treatment.   Must be reached to complete treatment? No  Methods/Strategies (must include amount  "and frequency):   1. Patient to report any changes in physical health to counselor.   2. Patient to attend all scheduled doctors appointments.   3. Patient to take medications as prescribed.   Target Date: 10/23/2019  Completion Date:     Problem: Patient is required to meet with provider in the clinic. Medicare appointment on 7/30/19   Goal: Patient will follow-up with Dr. Oneil for Medicare appointment in the [facility] as directed.  Must be reached to completed treatment? Yes  Methods/Strategies (must include amount and frequency):   1. Patient will meet with Dr. Oneil as scheduled to go over treatment plan and individual needs as required.   2. Patient will attend follow-up if MD requires.   Target Date: 7/30/19  Completion Date:     Dimension 3: Emotional/Behavioral/Cognitive, Risk level: 2  Problem Statement from Comprehensive Assessment:  Patient reports diagnosis of depression and anxiety. Patient receives his psychotropic medications from his primary physician    Problem: The patient has a mental health diagnoses of depression and anxiety  Goal: Patient will manage MH symptoms effectively to avoid future use of substances  Must be reached to complete treatment? Yes  Methods/Strategies (must include amount and frequency):   1. Patient to learn and begin using coping skills learned in CD treatment and/or therapy , and share in daily check-ins any benefits or challenges that you experience using these skills.  2.  Patient to continue taking MH meds as prescribed..   Target Date: 10/23/2019  Completion Date:     Problem: Lack of impulse control skills   Goal: Patient will develop coping skills to minimize impulsive behaviors  Must be reached to complete treatment? Yes  Methods/Strategies (must include amount and frequency):   1. Patient to complete \"Handling Crises\" assignment and share results with your counselor and/or treatment group.  Target Date: 10/23/2019  Completion Date:     Dimension 4: Readiness to " Change, Risk level 1  Problem Statement from Comprehensive Assessment:  Patient verbalizes a desire and readiness for change. Patient has no legal involvement.    Problem: Patient has lacked consistent behaviors for change in the past.   Goal: Patient to increase motivation towards recovery by participating in outpatient programming.   Must be reached to complete treatment? Yes  Methods/Strategies (must include amount and frequency):   1.Patient to attend all scheduled groups: Three, three hour groups on  Monday, Tuesday and Thursday  6-9:00 PM, and all scheduled individual counseling sessions.  2. Patient will contact staff beforehand if unable to attend or will be late: Dari([phone])  Target Date: 10/23/2019  Completion Date:     Problem: Patient appears to lack insight regarding how use has impacted all areas of life - relationships, family life, spirituality, physical health, finances, etc  Goal: Patient will complete Use History assignment  Must be reached to complete treatment? Yes  Methods/Strategies (must include amount and frequency):   1. Patient to prepare by completing Use History outline, then present it in group and receive feedback from peers and counselors.  Target Date: 10/23/2019  Completion Date:     Dimension 5: Relapse/Continued Use/Continued Problem Potential, Risk level: 3  Problem Statement from Comprehensive Assessment:  Patient lacks healthy, positive coping skills. Patient lacks skills to prevent relapse. Patient does not have significant periods of sobriety. Patient has one previous treatment episode.    Problem: Patient lacks healthy coping skills.   Goal: Patient will learn to identify triggers and early warning signs of use/relapse to gain awareness of patterns of use.  Must be reached to complete treatment? Yes  Methods/Strategies (must include amount and frequency):   Patient to share in daily check-in any urges and addictive thinking to better understand his pattern of use  "and to prevent relapse in the future.   Target Date: 10/23/2019  Completion Date:     Problem: Patient does not have a current relapse prevention plan   Goal: Patient will complete comprehensive Relapse Prevention Plan prior to program completion.  Must be reached to complete treatment? Yes  Methods/Strategies (must include amount and frequency): Patient to complete relapse prevention assignments during treatment, then complete \"Relapse Prevention Planning\" assignment and present to his/her group and/or counselor at time of EOP program completion.  Target Date: 10/23/2019  Completion Date:     Dimension 6: Recovery Environment, Risk level: 2  Problem Statement from Comprehensive Assessment:  Patient is retired. Patient has stable housing. Patient has support from two brothers and roommate, however, no current sober community involvement.    Problem: Patient lacks stable sober support   Goal: Patient will increase sober support options  Must be reached to complete treatment? Yes  Methods/Strategies (must include amount and frequency): Patient to investigate different forms of sober support, e.g. AA, NA CMA, WFS, HR, online support, participate in some form or support 1-2x weekly, and report reactions to counselor and/or treatment group.  Target Date: 10/23/19  Completion Date:     Problem: Patient lacks sober activities   Goal: Patient will increase participation in sober, enjoyable leisure activities  Must be reached to complete treatment? Yes  Methods/Strategies (must include amount and frequency): Patient to identify 3 new volunteer, recreational or spiritual activities to try, do that, then report to counselor and/or treatment group your reactions and your future plans to participate in these or other enjoyable activities.  Target Date: 10/23/19  Completion Date:       Resources  Resources to which the patient is being referred for problems when problems are to be addressed concurrently by another provider: The " patient will meet with Dr. Oneil in the [facility].       Vulnerable Adult Review   [X] Review of the facility Abuse Prevention plan was reviewed with the patient   [X] No individual abuse plan is necessary   [ ] In addition to the facility Abuse Prevention plan, an Individual Abuse Plan will be put in place       Staff Name/Title: Dari Dillard Date: 7/23/2019  Time: 2:24 PM    By signing this document, I am acknowledging that I was actively and directly involved in the development of my treatment plan.       Patient  Signature_________________________________________             Date__________________

## 2021-06-28 DIAGNOSIS — G47.00 INSOMNIA, UNSPECIFIED TYPE: ICD-10-CM

## 2021-06-28 NOTE — PROGRESS NOTES
Progress Notes by Lydia Cohen LADC at 2020  7:31 AM     Author: Lydia Cohen LADC Service: -- Author Type: Licensed Alcohol and Drug Counselor    Filed: 2020 10:45 AM Encounter Date: 2020 Status: Attested    : Lydia Cohen LADC (Licensed Alcohol and Drug Counselor) Cosigner: Mylene Robertson MD at 2/10/2020  9:25 AM    **Sensitive Note**    Attestation signed by Mylene Robertson MD at 2/10/2020  9:25 AM      Note reviewed and agree with above.                 OUTPATIENT SUBSTANCE USE DISORDER TREATMENT  DISCHARGE SUMMARY      Name:  Ezio Bassett   :  INA Jernigan   Admit Date: 2019   Discharge Date: 2020   :  1950   Hours Completed: 126   Initial Diagnosis:  Alcohol Use Disorder, severe (F10. 20)   Final Diagnosis:  Alcohol Use Disorder, severe (F10.20)   Discharge Address:    [address] [place] [address] Funding Source:    Medicare A&B     Discharge Type:  With Staff Approval (WSA)    Client was receiving residential services at the time of discharge:   No    Reasons for and circumstances of service termination:  The patient completed all three phases of the senior recovery program. Patient met all treatment goals and expectations.      If program discharge status was At Staff Request, the license faust must identify the following:    Other interested parties conferred with: not applicable    Referrals provided: not applicable    Alternatives considered and attempted before deciding to discharge:  not applicable    Dimension/Course of Treatment/Individualized Care:   1.  Withdrawal Potential - Intake Risk level -  1 Discharge Risk level - 0  Narrative supporting risk description:  The patient reports no substance use since 2019, no withdrawal symptoms presented while patient was in treatment.   Treatment plan goals and progress towards those goals:  Patient goal was to maintain  abstinence. Patient was successful in this goal as he has not endorsed any substance use since 7/21/2019.   2.  Biomedical Conditions and Complications - Intake Risk level -  1 Discharge Risk level - 1  Narrative supporting risk description:  The patient has biomedical conditions such as torn rotator cuffs & duputren's contracture. The patient has access to medical care and has a primary care provider.   Treatment plan goals and progress towards those goals:  The patient goal was to manage biomedical concerns. He was successful in this as he had surgery on his Duputren's Contracture and attended physical therapy for this. Patient had access to services his entire treatment episode and continues to be able to access care as needed.    3.  Emotional/Behavioral/Cognitive Conditions and Complications - Intake Risk level -  2 Discharge Risk level - 1  Narrative supporting risk description:  The patient has a mental health diagnoses of depression and anxiety. The patient has a psychotherapist and has been seeing Dr. Oneil in the [facility] for medication management. Patient is managing his mental health effectively at this time and appears to understand his mental health diagnoses. Patient has not endorsed any SI/SIB/HI while enrolled in treatment services.   Treatment plan goals and progress towards those goals:  Patient goal was to manage mental health. He was successful in this as he had regular appointments with his psychotherapist and Dr. Oneil in the [facility]. Patient reported he was medication compliant and felt like the integrated services were beneficial.    4.  Readiness for Change - Intake Risk level -  1 Discharge Risk level - 0  Narrative supporting risk description:  The patient was self motivated for treatment services. He was an active and engaged group member who met all treatment expectations and goals.   Treatment plan goals and progress towards those goals:  Patient goal was to follow through with intentions  "to treat substance abuse concerns. Patient met goals and followed through with his intentions to complete programing and meet goals. Patient engaged in all three phases of the senior recovery program.    5.  Relapse/Continued Use/Continued Problem Potential - Intake Risk level -  3 Discharge Risk level - 1  Narrative supporting risk description:  The patient has developed coping skills and understands how to implement coping skills. Patient has an understanding as to how his substance use impacted his mental health and vice versa. Patient developed a relapse prevention plan and implemented it through phase III. Patient is at a minimum risk for relapse at this time.   Treatment plan goals and progress towards those goals:  Patient goal was to develop relapse prevention skills and implement coping skills. Patient has been successful in this as he has been able to identify what his triggers are and identify the benefit of coping skills.    6.  Recovery Environment - Intake Risk level -  2 Discharge Risk level - 1  Narrative supporting risk description:  The patient is retired so is not working at this time, he does however have interest in engaging in volunteer work and has intentions to do that now that he is no longer engaged in the group setting. Patient has a positive living environment that is supportive of his recovery. Patient has been engaging in sober support groups and finding benefit in them. He has a sponsor and is working on the 12-steps at this time. At this time there is no legal involvement.   Treatment plan goals and progress towards those goals:  Patient goal was to develop sober structure and engage with sober supportive people. He has been successful in this as he attends various sober support groups in his community, has a sponsor and has developed structured activities to engage in on a weekly basis.    Strengths: Patient identified as \"very logical, science fact based and very organized\"  Needs: " Continued individual therapy, community support engagement, medication compliance.    Services Provided: Intake, assessment, treatment planning, education, group discussion, film, lectures, 1x1 therapy, and recommendations at discharge.      Program Involvement: Good  Attendance: Good  Ability to relate in group/   Other program activities: Good  Assignment Completion: Good  Overall Behavior: Good  Reported Family/Significant   Other Involvement: NA    Prognosis: Good      Recommendations       Identify and Maintain a Sober Social, Network of Friends and Attend sober support groups    Mental Health Referral  Individual Therapy and Med Compliance    Physical Health Referral:    Primary Care Provider     Counselor Name and Title:  Lydia MEDLEY, [facility]  Date:  2/7/2020  Time:  9:48 AM

## 2021-06-28 NOTE — TELEPHONE ENCOUNTER
"JF,    Pt requesting trazadone refill.  He says he doesn't take in the middle of the night anymore.  Only takes 2-3 at bed time.  Requesting #270.    Pended with 1 refill.    Please authorize if appropriate.  Thanks,  Carmina Milton RN      Pt doesn't need a call back    Requested Prescriptions   Pending Prescriptions Disp Refills     traZODone (DESYREL) 50 MG tablet 270 tablet 1     Sig: Take 2-3 tablets at bedtime       Serotonin Modulators Passed - 6/28/2021  2:31 PM        Passed - Recent (12 mo) or future (30 days) visit within the authorizing provider's specialty     Patient has had an office visit with the authorizing provider or a provider within the authorizing providers department within the previous 12 mos or has a future within next 30 days. See \"Patient Info\" tab in inbasket, or \"Choose Columns\" in Meds & Orders section of the refill encounter.              Passed - Medication is active on med list        Passed - Patient is age 18 or older             "

## 2021-06-28 NOTE — PROGRESS NOTES
Progress Notes by Lydia Cohen LADC at 10/7/2019  2:59 PM     Author: Lydia Cohen LADC Service: -- Author Type: Licensed Alcohol and Drug Counselor    Filed: 10/7/2019  3:05 PM Encounter Date: 10/7/2019 Status: Attested Addendum    : Lydia Cohen LADC (Licensed Alcohol and Drug Counselor)    Related Notes: Original Note by Lydia Cohen LADC (Licensed Alcohol and Drug Counselor) filed at 10/7/2019  3:03 PM    Cosigner: Mylene Robertson MD at 10/7/2019  3:14 PM    **Sensitive Note**    Attestation signed by yMlene Robertson MD at 10/7/2019  3:14 PM    Note reviewed and agree with above.                 Add[facility] IndividualTreatment [facility]     Patient  Name: Ezio Bassett  MRN: [id number]    : 1950  Admit Date:  19  Date of Treatment Plan: 19  Tentative Discharge Date: 2020  Diagnoses: Alcohol Use Disorder, Severe (F10.20)    Dimension 1: Acute Intoxication/Withdrawal Potential, Risk level: 0  Problem Statement from Comprehensive Assessment:  Patient reports his DOC as alcohol and his last use was on 2019.     Problem: Patient reports date of last use to be on 19  Goal: Patient to maintain abstinence throughout outpatient treatment.   Must be reached to complete treatment? Yes  Methods/Strategies (must include amount and frequency):   1. Patient to report any substance/alcohol use to counselor to determine if any changes need to be made to address withdrawal symptoms.   2. Patient to complete any requested UAs.   3. Attend group Monday and Friday for 3 hours, report any relapses, urges or triggers.   Target Date: 2020  Completion Date:     Dimension 2: Biomedical Conditions/Complications, Risk level: 1  Problem Statement from Comprehensive Assessment:  Patient reports stable health. Patient is able to seek cares as needed.     Problem: The patient reports diagnoses of Dupuytren's Contracture on right  hand, also, two torn rotator cuffs (both shoulders).   Goal: Patient to maintain stable health throughout outpatient treatment.   Must be reached to complete treatment? No  Methods/Strategies (must include amount and frequency):   1. Patient to report any changes in physical health to counselor.   2. Patient to attend all scheduled doctors appointments.   3. Patient to take medications as prescribed.   Target Date: 1/28/2020  Completion Date:     Problem: Patient is required to meet with provider in the clinic. Medicare appointment on 7/30/19   Goal: Patient will follow-up with Dr. Oneil for Medicare appointment in the [facility] as directed.  Must be reached to completed treatment? Yes  Methods/Strategies (must include amount and frequency):   1. Patient will meet with Dr. Oneil as scheduled to go over treatment plan and individual needs as required.   2. Patient will attend follow-up if MD requires.   Target Date: 1/28/2020  Completion Date:     Dimension 3: Emotional/Behavioral/Cognitive, Risk level: 2  Problem Statement from Comprehensive Assessment:  Patient reports diagnosis of depression and anxiety. Patient receives his psychotropic medications from his primary physician    Problem: The patient has a mental health diagnoses of depression and anxiety  Goal: Patient will manage MH symptoms effectively to avoid future use of substances  Must be reached to complete treatment? Yes  Methods/Strategies (must include amount and frequency):   1. Patient to learn and begin using coping skills learned in CD treatment and/or therapy , and share in daily check-ins any benefits or challenges that you experience using these skills.  2.  Patient to continue taking MH meds as prescribed.  Target Date: 1/28/2020  Completion Date:       Dimension 4: Readiness to Change, Risk level 0  Problem Statement from Comprehensive Assessment:  Patient verbalizes a desire and readiness for change. Patient has no legal  involvement.    Problem: Patient has lacked consistent behaviors for change in the past.   Goal: Patient to increase motivation towards recovery by participating in outpatient programming.   Must be reached to complete treatment? Yes  Methods/Strategies (must include amount and frequency):   1.Patient to attend all scheduled groups: Two, three hour groups on  Monday, and Friday 8:30 AM-11:30 AM, and all scheduled individual counseling sessions.  2. Patient will contact staff beforehand if unable to attend or will be late: Lydia [phone]  Target Date: 1/28/2020  Completion Date:      Dimension 5: Relapse/Continued Use/Continued Problem Potential, Risk level: 3  Problem Statement from Comprehensive Assessment:  Patient lacks healthy, positive coping skills. Patient lacks skills to prevent relapse. Patient does not have significant periods of sobriety. Patient has one previous treatment episode.    Problem: Patient lacks healthy coping skills.   Goal: Patient will learn to identify triggers and early warning signs of use/relapse to gain awareness of patterns of use.  Must be reached to complete treatment? Yes  Methods/Strategies (must include amount and frequency):   Patient to share in daily check-in any urges and addictive thinking to better understand his pattern of use and to prevent relapse in the future.   Target Date: 1/28/2020  Completion Date:     Dimension 6: Recovery Environment, Risk level: 2  Problem Statement from Comprehensive Assessment:  Patient is retired. Patient has stable housing. Patient has support from two brothers and roommate, however, no current sober community involvement.    Problem: Patient lacks stable sober support   Goal: Patient will increase sober support options  Must be reached to complete treatment? Yes  Methods/Strategies (must include amount and frequency): Patient to investigate different forms of sober support, e.g. AA, NA CMA, WFS, HR, online support, participate in  some form or support 1-2x weekly, and report reactions to counselor and/or treatment group.  Target Date: 1/28/2020  Completion Date:     Problem: Patient lacks sober activities   Goal: Patient will increase participation in sober, enjoyable leisure activities  Must be reached to complete treatment? Yes  Methods/Strategies (must include amount and frequency): Patient to identify 3 new volunteer, recreational or spiritual activities to try, do that, then report to counselor and/or treatment group your reactions and your future plans to participate in these or other enjoyable activities.  Target Date: 1/28/2020  Completion Date:       Resources  Resources to which the patient is being referred for problems when problems are to be addressed concurrently by another provider: The patient will meet with Dr. Oneil in the [facility].       Vulnerable Adult Review   [X] Review of the facility Abuse Prevention plan was reviewed with the patient   [X] No individual abuse plan is necessary   [ ] In addition to the facility Abuse Prevention plan, an Individual Abuse Plan will be put in place       Staff Name/Title: INA Jernigan Date: 10/7/2019  Time: 2:59 PM    By signing this document, I am acknowledging that I was actively and directly involved in the development of my treatment plan.       Patient  Signature_________________________________________             Date__________________

## 2021-06-28 NOTE — PROGRESS NOTES
Progress Notes by Lydia Cohen LADC at 2020  2:33 PM     Author: Lydia Choen LADC Service: -- Author Type: Licensed Alcohol and Drug Counselor    Filed: 2020  2:52 PM Encounter Date: 2020 Status: Attested    : Lydia Cohen LADC (Licensed Alcohol and Drug Counselor) Cosigner: Mylene Robertson MD at 2020  4:17 PM    **Sensitive Note**    Attestation signed by Mylene Robertson MD at 2020  4:17 PM      Note reviewed and agree with above.                 Addiction [facility] - [facility] IndividualTreatment Plan     Patient  Name: Ezio Bassett  MRN: [id number]    : 1950  Admit Date:  19  Date of Treatment Plan: 19  Tentative Discharge Date: 3/3/2020  Diagnoses: Alcohol Use Disorder, Severe (F10.20)    Dimension 1: Acute Intoxication/Withdrawal Potential, Risk level: 0  Problem Statement from Comprehensive Assessment:  Patient reports his DOC as alcohol and his last use was on 2019.   Update: The patient has been able to maintain abstinence while engaged in outpatient services.     Problem: Patient reports date of last use to be on 19  Goal: Patient to maintain abstinence throughout outpatient treatment.   Must be reached to complete treatment? Yes  Methods/Strategies (must include amount and frequency):   1. Patient to report any substance/alcohol use to counselor to determine if any changes need to be made to address withdrawal symptoms.   2. Patient to complete any requested UAs.   3. Attend group  for 3 hours, report any relapses, urges or triggers.   Target Date: 3/3/2020  Completion Date:     Dimension 2: Biomedical Conditions/Complications, Risk level: 1  Problem Statement from Comprehensive Assessment:  Patient reports stable health. Patient is able to seek cares as needed.   Update: Patient has received surgery for his dupuytren's contracture and completed the recommendations for that. Patient is able  to access care as needed and is currently stable.     Problem: The patient reports diagnoses of Dupuytren's Contracture on right hand, also, two torn rotator cuffs (both shoulders).   Goal: Patient to maintain stable health throughout outpatient treatment.   Must be reached to complete treatment? No  Methods/Strategies (must include amount and frequency):   1. Patient to report any changes in physical health to counselor.   2. Patient to attend all scheduled doctors appointments.   3. Patient to take medications as prescribed.   Target Date: 3/3/2020  Completion Date:     Problem: Patient is required to meet with provider in the clinic.  Goal: Patient will follow-up with Dr. Oneil for Medicare appointment in the [facility] as directed.  Must be reached to completed treatment? Yes  Methods/Strategies (must include amount and frequency):   1. Patient will meet with Dr. Oneil as scheduled to go over treatment plan and individual needs as required.   2. Patient will attend follow-up if MD requires.   Target Date: 3/3/2020  Completion Date:     Dimension 3: Emotional/Behavioral/Cognitive, Risk level: 2  Problem Statement from Comprehensive Assessment:  Patient reports diagnosis of depression and anxiety. Patient receives his psychotropic medications from his primary physician  Update: Patient has stable mental health at this time. Patient is engaged in individual therapy and finds benefit from that. He is medication compliant at this time.     Problem: The patient has a mental health diagnoses of depression and anxiety  Goal: Patient will manage MH symptoms effectively to avoid future use of substances  Must be reached to complete treatment? Yes  Methods/Strategies (must include amount and frequency):   1. Patient to learn and begin using coping skills learned in CD treatment and/or therapy , and share in daily check-ins any benefits or challenges that you experience using these skills.  2.  Patient to continue taking MH  meds as prescribed.  Target Date: 3/3/2020  Completion Date:     Dimension 4: Readiness to Change, Risk level 0  Problem Statement from Comprehensive Assessment:  Patient verbalizes a desire and readiness for change. Patient has no legal involvement.    Problem: Patient has lacked consistent behaviors for change in the past.   Goal: Patient to increase motivation towards recovery by participating in outpatient programming.   Must be reached to complete treatment? Yes  Methods/Strategies (must include amount and frequency):   1.Patient to attend all scheduled groups: One, two hour groups on Tuesday 8:30 AM-10:30 AM, and all scheduled individual counseling sessions.  2. Patient will contact staff beforehand if unable to attend or will be late: Lydia [phone]  Target Date: 3/3/2020  Completion Date:      Dimension 5: Relapse/Continued Use/Continued Problem Potential, Risk level: 2  Problem Statement from Comprehensive Assessment:  Patient lacks healthy, positive coping skills. Patient lacks skills to prevent relapse. Patient does not have significant periods of sobriety. Patient has one previous treatment episode.  Update: The patient has been developing coping skills and working on implementing said skills. Patient has been sober since 7/21/2020.     Problem: Patient lacks healthy coping skills.   Goal: Patient will learn to identify triggers and early warning signs of use/relapse to gain awareness of patterns of use.  Must be reached to complete treatment? Yes  Methods/Strategies (must include amount and frequency):   Patient to share in daily check-in any urges and addictive thinking to better understand his pattern of use and to prevent relapse in the future.   Target Date: 3/3/2020  Completion Date:      Problem: Implementation of coping skills   Goal: Implement developed coping skills   Must be reached to completed treatment? Yes  Methods/Strategies (must include amount and frequency):    1. Discuss how  coping skills are beneficial   Target Date: 3/3/2020  Completion Date:     Dimension 6: Recovery Environment, Risk level: 1  Problem Statement from Comprehensive Assessment:  Patient is retired. Patient has stable housing. Patient has support from two brothers and roommate, however, no current sober community involvement.  Update: The patient has been successful in the development of sober supports. He is engaged in sober support groups both within AA and without. Patient has a sponsor whom he engages with and has supportive peers. The patient has been successful in the development of structured activities and schedules. Patient has no current legal concerns.     Problem: Patient lacks stable sober support   Goal: Patient will increase sober support options  Must be reached to complete treatment? Yes  Methods/Strategies (must include amount and frequency): Patient to investigate different forms of sober support, e.g. AA, NA CMA, WFS, HR, online support, participate in some form or support 1-2x weekly, and report reactions to counselor and/or treatment group.  Target Date: 3/3/2020  Completion Date: 1/27/2020    Problem: Patient lacks sober activities   Goal: Patient will increase participation in sober, enjoyable leisure activities  Must be reached to complete treatment? Yes  Methods/Strategies (must include amount and frequency): Patient to identify 3 new volunteer, recreational or spiritual activities to try, do that, then report to counselor and/or treatment group your reactions and your future plans to participate in these or other enjoyable activities.  Target Date: 1/28/2020  Completion Date: 1/27/2020    Problem: hobbies   Goal: Identify hobbies to engage in   Must be reached to completed treatment? N/A  Methods/Strategies (must include amount and frequency): Identify hobbies to engage in and report how they are beneficial veronica why they bring tony.   Target Date: 3/3/2020  Completion Date:      Resources  Resources to which the patient is being referred for problems when problems are to be addressed concurrently by another provider: The patient will meet with Dr. Oneil in the [facility].       Vulnerable Adult Review   [X] Review of the facility Abuse Prevention plan was reviewed with the patient   [X] No individual abuse plan is necessary   [ ] In addition to the facility Abuse Prevention plan, an Individual Abuse Plan will be put in place       Staff Name/Title: Lydia Strange [facility] Date: 1/27/2020  Time: 2:33 PM    By signing this document, I am acknowledging that I was actively and directly involved in the development of my treatment plan.       Patient  Signature_________________________________________             Date__________________

## 2021-06-29 RX ORDER — TRAZODONE HYDROCHLORIDE 50 MG/1
TABLET, FILM COATED ORAL
Qty: 270 TABLET | Refills: 1 | Status: SHIPPED | OUTPATIENT
Start: 2021-06-29 | End: 2022-03-22
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
## 2021-09-03 ENCOUNTER — DOCUMENTATION ONLY (OUTPATIENT)
Dept: FAMILY MEDICINE | Facility: CLINIC | Age: 71
End: 2021-09-03

## 2021-09-03 DIAGNOSIS — Z13.1 DIABETES MELLITUS SCREENING: ICD-10-CM

## 2021-09-03 DIAGNOSIS — Z13.220 LIPID SCREENING: Primary | ICD-10-CM

## 2021-09-03 DIAGNOSIS — Z12.5 PROSTATE CANCER SCREENING: ICD-10-CM

## 2021-09-08 ENCOUNTER — LAB (OUTPATIENT)
Dept: LAB | Facility: CLINIC | Age: 71
End: 2021-09-08
Payer: COMMERCIAL

## 2021-09-08 DIAGNOSIS — Z13.220 LIPID SCREENING: ICD-10-CM

## 2021-09-08 DIAGNOSIS — Z12.5 PROSTATE CANCER SCREENING: ICD-10-CM

## 2021-09-08 DIAGNOSIS — Z13.1 DIABETES MELLITUS SCREENING: ICD-10-CM

## 2021-09-08 LAB
ALBUMIN SERPL-MCNC: 3.6 G/DL (ref 3.4–5)
ALP SERPL-CCNC: 69 U/L (ref 40–150)
ALT SERPL W P-5'-P-CCNC: 30 U/L (ref 0–70)
ANION GAP SERPL CALCULATED.3IONS-SCNC: 2 MMOL/L (ref 3–14)
AST SERPL W P-5'-P-CCNC: 23 U/L (ref 0–45)
BILIRUB SERPL-MCNC: 0.5 MG/DL (ref 0.2–1.3)
BUN SERPL-MCNC: 15 MG/DL (ref 7–30)
CALCIUM SERPL-MCNC: 8 MG/DL (ref 8.5–10.1)
CHLORIDE BLD-SCNC: 109 MMOL/L (ref 94–109)
CHOLEST SERPL-MCNC: 199 MG/DL
CO2 SERPL-SCNC: 27 MMOL/L (ref 20–32)
CREAT SERPL-MCNC: 1.17 MG/DL (ref 0.66–1.25)
GFR SERPL CREATININE-BSD FRML MDRD: 62 ML/MIN/1.73M2
GLUCOSE BLD-MCNC: 103 MG/DL (ref 70–99)
HDLC SERPL-MCNC: 45 MG/DL
LDLC SERPL CALC-MCNC: 126 MG/DL
NONHDLC SERPL-MCNC: 154 MG/DL
POTASSIUM BLD-SCNC: 4.6 MMOL/L (ref 3.4–5.3)
PROT SERPL-MCNC: 7 G/DL (ref 6.8–8.8)
PSA SERPL-MCNC: 2.26 UG/L (ref 0–4)
SODIUM SERPL-SCNC: 138 MMOL/L (ref 133–144)
TRIGL SERPL-MCNC: 142 MG/DL

## 2021-09-08 PROCEDURE — 80061 LIPID PANEL: CPT

## 2021-09-08 PROCEDURE — 36415 COLL VENOUS BLD VENIPUNCTURE: CPT

## 2021-09-08 PROCEDURE — G0103 PSA SCREENING: HCPCS

## 2021-09-08 PROCEDURE — 80053 COMPREHEN METABOLIC PANEL: CPT

## 2021-09-09 ASSESSMENT — PATIENT HEALTH QUESTIONNAIRE - PHQ9
SUM OF ALL RESPONSES TO PHQ QUESTIONS 1-9: 3
10. IF YOU CHECKED OFF ANY PROBLEMS, HOW DIFFICULT HAVE THESE PROBLEMS MADE IT FOR YOU TO DO YOUR WORK, TAKE CARE OF THINGS AT HOME, OR GET ALONG WITH OTHER PEOPLE: SOMEWHAT DIFFICULT
SUM OF ALL RESPONSES TO PHQ QUESTIONS 1-9: 3

## 2021-09-09 ASSESSMENT — ANXIETY QUESTIONNAIRES
7. FEELING AFRAID AS IF SOMETHING AWFUL MIGHT HAPPEN: NOT AT ALL
7. FEELING AFRAID AS IF SOMETHING AWFUL MIGHT HAPPEN: NOT AT ALL
GAD7 TOTAL SCORE: 2
8. IF YOU CHECKED OFF ANY PROBLEMS, HOW DIFFICULT HAVE THESE MADE IT FOR YOU TO DO YOUR WORK, TAKE CARE OF THINGS AT HOME, OR GET ALONG WITH OTHER PEOPLE?: NOT DIFFICULT AT ALL
GAD7 TOTAL SCORE: 2
3. WORRYING TOO MUCH ABOUT DIFFERENT THINGS: NOT AT ALL
5. BEING SO RESTLESS THAT IT IS HARD TO SIT STILL: SEVERAL DAYS
6. BECOMING EASILY ANNOYED OR IRRITABLE: NOT AT ALL
1. FEELING NERVOUS, ANXIOUS, OR ON EDGE: NOT AT ALL
GAD7 TOTAL SCORE: 2
2. NOT BEING ABLE TO STOP OR CONTROL WORRYING: NOT AT ALL
4. TROUBLE RELAXING: SEVERAL DAYS

## 2021-09-09 ASSESSMENT — ENCOUNTER SYMPTOMS
ARTHRALGIAS: 1
FREQUENCY: 1
MYALGIAS: 1

## 2021-09-09 ASSESSMENT — ACTIVITIES OF DAILY LIVING (ADL): CURRENT_FUNCTION: NO ASSISTANCE NEEDED

## 2021-09-09 NOTE — PROGRESS NOTES
"SUBJECTIVE:   Ezio Bassett is a 70 year old male who presents for Preventive Visit.    Patient has been advised of split billing requirements and indicates understanding: Yes   Are you in the first 12 months of your Medicare coverage?  No    Healthy Habits:     In general, how would you rate your overall health?  Good    Frequency of exercise:  4-5 days/week    Duration of exercise:  30-45 minutes    Do you usually eat at least 4 servings of fruit and vegetables a day, include whole grains    & fiber and avoid regularly eating high fat or \"junk\" foods?  Yes    Taking medications regularly:  Yes    Medication side effects:  Muscle aches    Ability to successfully perform activities of daily living:  No assistance needed    Home Safety:  No safety concerns identified    Hearing Impairment:  Difficulty following a conversation in a noisy restaurant or crowded room, difficulty following dialogue in the theater and need to ask people to speak up or repeat themselves    In the past 6 months, have you been bothered by leaking of urine?  No    In general, how would you rate your overall mental or emotional health?  Good      PHQ-2 Total Score: 1    Additional concerns today:  Yes    Answers for HPI/ROS submitted by the patient on 9/9/2021  If you checked off any problems, how difficult have these problems made it for you to do your work, take care of things at home, or get along with other people?: Somewhat difficult  PHQ9 TOTAL SCORE: 3  NATALY 7 TOTAL SCORE: 2      He also mentions some urinary symptoms; nocturia q 2 hours. Prostate exam is:  Mildly enlarged, no masses    He is told these symptoms are probably related to BPH. He is given an rx for flomax. He should alert me if there are progressive symptoms.    He also notes mild generalized fatigue, somewhat chronic. There's been no weight loss or fever or other localizing symptoms. Exam shows no specific findings to suggest a clear cause. No fever, no " lymphadenopathy. Normal heart, lungs, abdomen. Patient is reassured that fatigue is common and does not always represent an active disease process. It may be related to not getting enough sleep, a mild viral infection or an as-yet unknown medical problem that may evolve over time. I have suggested observation for worsening or other new symptoms such as pain, fever or weight loss and running a few tests, as ordered. He will follow up if symptoms persist or worsen.        The 10-year ASCVD risk score ([place] SUJEY Jr., et al., 2013) is: 15.5%    Values used to calculate the score:      Age: 70 years      Sex: Male      Is Non- : No      Diabetic: No      Tobacco smoker: No      Systolic Blood Pressure: 109 mmHg      Is BP treated: No      HDL Cholesterol: 45 mg/dL      Total Cholesterol: 199 mg/dL      He has noted some skin lesions he wants checked at this visit. Observations by patient are crusty, lesion, left scalp. Exam of skin shows actinic keratosis. Discussed treatment, pt elects to do it    Liquid nitrogen was applied for 5-10 seconds x3  to the skin lesions and the expected blistering or scabbing reaction explained. Do not pick at the areas. Patient reminded to expect hypopigmented scars from the procedure. Return if lesions fail to fully resolve.    Also groin rash, topical helpful in the past    Patient has stable chronic conditions as noted in A/P including  Dysthymia, Fatigue, unspecified type,   Alcohol dependence in early full remission (H), Recurrent major depressive disorder, in partial remission (H), and Essential hypertension were also pertinent to this visit.    These diagnoses were each reviewed for stability and medications were reviewed and refilled, labs ordered and updated.      Do you feel safe in your environment? Yes    Have you ever done Advance Care Planning? (For example, a Health Directive, POLST, or a discussion with a medical provider or your loved ones about your  wishes): Yes, advance care planning is on file.       Fall risk  Fallen 2 or more times in the past year?: No  Any fall with injury in the past year?: No    Cognitive Screening Not completed - pt declined    Do you have sleep apnea, excessive snoring or daytime drowsiness?: no    Reviewed and updated as needed this visit by clinical staff  Tobacco  Allergies  Meds   Med Hx  Surg Hx  Fam Hx  Soc Hx        Reviewed and updated as needed this visit by Provider                Social History     Tobacco Use     Smoking status: Former Smoker     Packs/day: 0.00     Years: 40.00     Pack years: 0.00     Types: Cigarettes     Start date: 10/1/1966     Quit date: 5/10/2008     Years since quittin.3     Smokeless tobacco: Never Used   Substance Use Topics     Alcohol use: Not Currently     Comment: drinks daily approx 3 drinks of vodka, last drink at 2200 yesterday     If you drink alcohol do you typically have >3 drinks per day or >7 drinks per week? No    Sober 2 years      No flowsheet data found.        Current providers sharing in care for this patient include:  Patient Care Team:  Jhon Vang MD as PCP - General (Family Medicine)  Rosetta Martinez MD as MD (Pulmonary Disease)  Jhon Vang MD as Assigned PCP    The following health maintenance items are reviewed in Epic and correct as of today:  Health Maintenance Due   Topic Date Due     ANNUAL REVIEW OF  ORDERS  Never done     DEPRESSION ACTION PLAN  Never done     HEPATITIS C SCREENING  Never done     AORTIC ANEURYSM SCREENING (SYSTEM ASSIGNED)  Never done     ZOSTER IMMUNIZATION (3 of 3) 2019     INFLUENZA VACCINE (1) 2021     FALL RISK ASSESSMENT  2021     Lab work is in process  Labs reviewed in EPIC  BP Readings from Last 3 Encounters:   21 109/72   20 105/68   19 (!) 146/94    Wt Readings from Last 3 Encounters:   21 92 kg (202 lb 14.4 oz)   20 101.3 kg  (223 lb 6.4 oz)   19 91.2 kg (201 lb)                  Patient Active Problem List   Diagnosis     HTN (hypertension)     Hyperlipidemia     Insomnia     LVH (left ventricular hypertrophy)     Alcohol dependence in early full remission (H)     Ex-smoker     Dysthymia     Past Surgical History:   Procedure Laterality Date     BIOPSY SKIN (LOCATION)       EYE SURGERY      Tube from left eye to nose for draining tears.     TONSILLECTOMY         Social History     Tobacco Use     Smoking status: Former Smoker     Packs/day: 0.00     Years: 40.00     Pack years: 0.00     Types: Cigarettes     Start date: 10/1/1966     Quit date: 5/10/2008     Years since quittin.3     Smokeless tobacco: Never Used   Substance Use Topics     Alcohol use: Not Currently     Comment: drinks daily approx 3 drinks of vodka, last drink at 2200 yesterday     Family History   Problem Relation Age of Onset     Cancer Mother      Brain Cancer Mother          Current Outpatient Medications   Medication Sig Dispense Refill     Cholecalciferol (VITAMIN D) 2000 UNIT CAPS Take  by mouth.       citalopram (CELEXA) 40 MG tablet Take 1 tablet (40 mg) by mouth daily 90 tablet 0     gabapentin (NEURONTIN) 100 MG capsule TAKE 1 TO 2 TABLETS BY MOUTH AT BEDTIME 60 capsule 5     ibuprofen (ADVIL/MOTRIN) 800 MG tablet TAKE 1 TABLET BY MOUTH UP TO 3 TIMES A DAY AS NEEDED FOR PAIN 90 tablet 3     ipratropium (ATROVENT) 0.06 % nasal spray Spray 1 spray in nostril       Melatonin 2.5 MG CAPS Take by mouth daily       Multiple Vitamins-Minerals (MULTIVITAMIN ADULT PO) Take 1 tablet by mouth       pravastatin (PRAVACHOL) 40 MG tablet TAKE 1 TABLET BY MOUTH EVERY DAY 30 tablet 0     traZODone (DESYREL) 50 MG tablet Take 2-3 tablets at bedtime 270 tablet 1     triamcinolone (KENALOG) 0.1 % external cream Apply topically 2 times daily 453 g 1     cycloSPORINE (RESTASIS) 0.05 % ophthalmic emulsion 1 drop       No Known Allergies          Review of Systems  "  Genitourinary: Positive for frequency and genital sores. Negative for discharge.   Musculoskeletal: Positive for arthralgias and myalgias.   Skin: Positive for rash.         OBJECTIVE:   /72   Pulse 69   Temp 98  F (36.7  C) (Temporal)   Resp 20   Ht 1.748 m (5' 8.8\")   Wt 92 kg (202 lb 14.4 oz)   SpO2 97%   BMI 30.14 kg/m   Estimated body mass index is 30.14 kg/m  as calculated from the following:    Height as of this encounter: 1.748 m (5' 8.8\").    Weight as of this encounter: 92 kg (202 lb 14.4 oz).  Physical Exam    General Appearance: Pleasant, alert, in no acute respiratory distress.  Head Exam: Normal. Normocephalic, atraumatic. No sinus tenderness.  Eye Exam: Normal external eye, conjunctiva, lids. SONIYA.  Ear Exam: Normal auditory canals and external ears. Non-tender.  Left TM-normal. Right TM-normal.  Neck Exam: Supple, no obvious thyroid enlargement  Chest/Respiratory Exam: Normal, comfortable, easy respirations. Chest wall normal. Lungs are clear to auscultation. No wheezing, crackles, or rhonchi.  Cardiovascular Exam: Regular rate and rhythm. No murmur, gallop, or rubs.  Musculoskeletal Exam: Back is non-tender, full ROM of upper and lower extremities.  Skin: no rash, warm and dry.  As above  Neurologic Exam: Nonfocal, no tremor. Normal gait.  Psychiatric Exam: Alert - appropriate, normal affect          ASSESSMENT / PLAN:       ICD-10-CM    1. Encounter for Medicare annual wellness exam  Z00.00    2. Groin rash  R21 oxiconazole nitrate (OXISTAT) 1 % external lotion   3. Dysthymia  F34.1    4. Fatigue, unspecified type  R53.83 TSH with free T4 reflex     Parathyroid Hormone Intact     OFFICE/OUTPT VISIT,EST,LEVL III   5. Benign prostatic hyperplasia with nocturia  N40.1 tamsulosin (FLOMAX) 0.4 MG capsule    R35.1 OFFICE/OUTPT VISIT,EST,LEVL III   6. Actinic keratosis  L57.0 DESTRUCT PREMALIGNANT LESION, FIRST   7. Alcohol dependence in early full remission (H)  F10.21    8. Recurrent " "major depressive disorder, in partial remission (H)  F33.41    9. Essential hypertension  I10 pravastatin (PRAVACHOL) 40 MG tablet       Patient has been advised of split billing requirements and indicates understanding: Yes  COUNSELING:  Reviewed preventive health counseling, as reflected in patient instructions       Regular exercise       Healthy diet/nutrition    Estimated body mass index is 30.14 kg/m  as calculated from the following:    Height as of this encounter: 1.748 m (5' 8.8\").    Weight as of this encounter: 92 kg (202 lb 14.4 oz).        He reports that he quit smoking about 13 years ago. His smoking use included cigarettes. He started smoking about 54 years ago. He smoked 0.00 packs per day for 40.00 years. He has never used smokeless tobacco.      Appropriate preventive services were discussed with this patient, including applicable screening as appropriate for cardiovascular disease, diabetes, osteopenia/osteoporosis, and glaucoma.  As appropriate for age/gender, discussed screening for colorectal cancer, prostate cancer, breast cancer, and cervical cancer. Checklist reviewing preventive services available has been given to the patient.    Reviewed patients plan of care and provided an AVS. The Basic Care Plan (routine screening as documented in Health Maintenance) for Ezio meets the Care Plan requirement. This Care Plan has been established and reviewed with the Patient.    Counseling Resources:  ATP IV Guidelines  Pooled Cohorts Equation Calculator  Breast Cancer Risk Calculator  Breast Cancer: Medication to Reduce Risk  FRAX Risk Assessment  ICSI Preventive Guidelines  Dietary Guidelines for Americans, 2010  [employer]'s MyPlate  ASA Prophylaxis  Lung CA Screening    Jhon Vang MD  [facility]    Identified Health Risks:  "

## 2021-09-09 NOTE — PATIENT INSTRUCTIONS
Patient Education   Personalized Prevention Plan  You are due for the preventive services outlined below.  Your care team is available to assist you in scheduling these services.  If you have already completed any of these items, please share that information with your care team to update in your medical record.  Health Maintenance Due   Topic Date Due     ANNUAL REVIEW OF HM ORDERS  Never done     Depression Action Plan  Never done     Hepatitis C Screening  Never done     Annual Wellness Visit  Never done     AORTIC ANEURYSM SCREENING (SYSTEM ASSIGNED)  Never done     Zoster (Shingles) Vaccine (3 of 3) 12/19/2019     Depression Assessment  03/23/2020     Flu Vaccine (1) 09/01/2021     FALL RISK ASSESSMENT  09/14/2021

## 2021-09-10 ASSESSMENT — ANXIETY QUESTIONNAIRES: GAD7 TOTAL SCORE: 2

## 2021-09-11 ENCOUNTER — HEALTH MAINTENANCE LETTER (OUTPATIENT)
Age: 71
End: 2021-09-11

## 2021-09-13 ENCOUNTER — OFFICE VISIT (OUTPATIENT)
Dept: FAMILY MEDICINE | Facility: CLINIC | Age: 71
End: 2021-09-13
Payer: COMMERCIAL

## 2021-09-13 VITALS
HEIGHT: 69 IN | OXYGEN SATURATION: 97 % | TEMPERATURE: 98 F | WEIGHT: 202.9 LBS | HEART RATE: 69 BPM | BODY MASS INDEX: 30.05 KG/M2 | RESPIRATION RATE: 20 BRPM | DIASTOLIC BLOOD PRESSURE: 72 MMHG | SYSTOLIC BLOOD PRESSURE: 109 MMHG

## 2021-09-13 DIAGNOSIS — F33.41 RECURRENT MAJOR DEPRESSIVE DISORDER, IN PARTIAL REMISSION (H): ICD-10-CM

## 2021-09-13 DIAGNOSIS — I10 ESSENTIAL HYPERTENSION: ICD-10-CM

## 2021-09-13 DIAGNOSIS — R21 GROIN RASH: ICD-10-CM

## 2021-09-13 DIAGNOSIS — N40.1 BENIGN PROSTATIC HYPERPLASIA WITH NOCTURIA: ICD-10-CM

## 2021-09-13 DIAGNOSIS — R53.83 FATIGUE, UNSPECIFIED TYPE: ICD-10-CM

## 2021-09-13 DIAGNOSIS — F34.1 DYSTHYMIA: ICD-10-CM

## 2021-09-13 DIAGNOSIS — R35.1 BENIGN PROSTATIC HYPERPLASIA WITH NOCTURIA: ICD-10-CM

## 2021-09-13 DIAGNOSIS — F10.21 ALCOHOL DEPENDENCE IN EARLY FULL REMISSION (H): ICD-10-CM

## 2021-09-13 DIAGNOSIS — L57.0 ACTINIC KERATOSIS: ICD-10-CM

## 2021-09-13 DIAGNOSIS — Z00.00 ENCOUNTER FOR MEDICARE ANNUAL WELLNESS EXAM: Primary | ICD-10-CM

## 2021-09-13 PROCEDURE — G0438 PPPS, INITIAL VISIT: HCPCS | Performed by: FAMILY MEDICINE

## 2021-09-13 PROCEDURE — 99213 OFFICE O/P EST LOW 20 MIN: CPT | Mod: 25 | Performed by: FAMILY MEDICINE

## 2021-09-13 PROCEDURE — 17000 DESTRUCT PREMALG LESION: CPT | Performed by: FAMILY MEDICINE

## 2021-09-13 RX ORDER — PRAVASTATIN SODIUM 40 MG
40 TABLET ORAL DAILY
Qty: 90 TABLET | Refills: 3 | Status: SHIPPED | OUTPATIENT
Start: 2021-09-13 | End: 2022-09-23

## 2021-09-13 RX ORDER — TAMSULOSIN HYDROCHLORIDE 0.4 MG/1
0.4 CAPSULE ORAL DAILY
Qty: 90 CAPSULE | Refills: 3 | Status: SHIPPED | OUTPATIENT
Start: 2021-09-13 | End: 2022-10-17

## 2021-09-13 ASSESSMENT — ENCOUNTER SYMPTOMS
ARTHRALGIAS: 1
FREQUENCY: 1
MYALGIAS: 1

## 2021-09-13 ASSESSMENT — MIFFLIN-ST. JEOR: SCORE: 1662.55

## 2021-09-13 ASSESSMENT — PATIENT HEALTH QUESTIONNAIRE - PHQ9: SUM OF ALL RESPONSES TO PHQ QUESTIONS 1-9: 3

## 2021-09-13 ASSESSMENT — ACTIVITIES OF DAILY LIVING (ADL): CURRENT_FUNCTION: NO ASSISTANCE NEEDED

## 2021-09-20 DIAGNOSIS — R21 GROIN RASH: ICD-10-CM

## 2021-09-28 NOTE — TELEPHONE ENCOUNTER
Patient calling   This is not a refill request  Is a request for a PA  Routing to PA to advise  Chrissy PLAZA RN    Of note pt wanted lab results for PTH and TSH - not drawn   Pt states it was supposed to be added on to blood drawn 9/8/2021  Scheduled lab only

## 2021-09-29 ENCOUNTER — LAB (OUTPATIENT)
Dept: LAB | Facility: CLINIC | Age: 71
End: 2021-09-29
Payer: COMMERCIAL

## 2021-09-29 ENCOUNTER — TELEPHONE (OUTPATIENT)
Dept: FAMILY MEDICINE | Facility: CLINIC | Age: 71
End: 2021-09-29

## 2021-09-29 DIAGNOSIS — R53.83 FATIGUE, UNSPECIFIED TYPE: ICD-10-CM

## 2021-09-29 DIAGNOSIS — R21 GROIN RASH: Primary | ICD-10-CM

## 2021-09-29 LAB
PTH-INTACT SERPL-MCNC: 50 PG/ML (ref 18–80)
TSH SERPL DL<=0.005 MIU/L-ACNC: 1.75 MU/L (ref 0.4–4)

## 2021-09-29 PROCEDURE — 83970 ASSAY OF PARATHORMONE: CPT

## 2021-09-29 PROCEDURE — 84443 ASSAY THYROID STIM HORMONE: CPT

## 2021-09-29 PROCEDURE — 36415 COLL VENOUS BLD VENIPUNCTURE: CPT

## 2021-09-30 ENCOUNTER — MYC MEDICAL ADVICE (OUTPATIENT)
Dept: FAMILY MEDICINE | Facility: CLINIC | Age: 71
End: 2021-09-30

## 2021-09-30 DIAGNOSIS — Z87.891 PERSONAL HISTORY OF TOBACCO USE: Primary | ICD-10-CM

## 2021-09-30 NOTE — TELEPHONE ENCOUNTER
Central Prior Authorization Team   Phone: [phone]      PA Initiation    Medication: oxiconazole nitrate (OXISTAT) 1 % external lotion - INITIATED  Insurance Company: [employer] [place] - Phone [phone] Fax [phone]  Pharmacy Filling the Rx: [id number] - [place], [place] - [address] [place] [address]  Filling Pharmacy Phone: [phone]  Filling Pharmacy Fax: [phone]  Start Date: 9/30/2021

## 2021-10-01 NOTE — TELEPHONE ENCOUNTER
Central Prior Authorization Team   Phone: [phone]      PRIOR AUTHORIZATION DENIED    Medication: oxiconazole nitrate (OXISTAT) 1 % external lotion - DENIED    Denial Date: 9/30/2021    Denial Rational:       Appeal Information:

## 2021-10-04 ENCOUNTER — MYC MEDICAL ADVICE (OUTPATIENT)
Dept: FAMILY MEDICINE | Facility: CLINIC | Age: 71
End: 2021-10-04

## 2021-10-05 RX ORDER — KETOCONAZOLE 20 MG/G
CREAM TOPICAL DAILY
Qty: 30 G | Refills: 3 | Status: SHIPPED | OUTPATIENT
Start: 2021-10-05 | End: 2023-01-23

## 2021-10-06 NOTE — PATIENT INSTRUCTIONS

## 2021-10-06 NOTE — TELEPHONE ENCOUNTER
Lung Cancer Screening Shared Decision Making Visit     Ezio Bassett is eligible for lung cancer screening on the basis of the information provided in my signed lung cancer screening order.     I have discussed with patient the risks and benefits of screening for lung cancer with low-dose CT.     The risks include:  radiation exposure: one low dose chest CT has as much ionizing radiation as about 15 chest x-rays or 6 months of background radiation living in [place]    false positives: 96% of positive findings/nodules are NOT cancer, but some might still require additional diagnostic evaluation, including biopsy  over-diagnosis: some slow growing cancers that might never have been clinically significant will be detected and treated unnecessarily     The benefit of early detection of lung cancer is contingent upon adherence to annual screening or more frequent follow up if indicated.     Furthermore, reaping the benefits of screening requires Ezio Bassett to be willing and physically able to undergo diagnostic procedures, if indicated. Although no specific guide is available for determining severity of comorbidities, it is reasonable to withhold screening in patients who have greater mortality risk from other diseases.     We did discuss that the only way to prevent lung cancer is to not smoke. Smoking cessation counseling was not given.      I did offer risk estimation using a calculator such as this one:    ShouldIScreen

## 2021-10-07 ENCOUNTER — TELEPHONE (OUTPATIENT)
Dept: FAMILY MEDICINE | Facility: CLINIC | Age: 71
End: 2021-10-07

## 2021-10-07 DIAGNOSIS — Z87.891 PERSONAL HISTORY OF TOBACCO USE: ICD-10-CM

## 2021-10-07 NOTE — TELEPHONE ENCOUNTER
JF,    Informed pt below.    He says he would like this done at [facility] as it is within walking distance  .  He says [facility]  told him he just needs and order from you faxed to [facility] and they will send results back to you.    Please advise.  Thanks,  Carmina Milton RN

## 2021-10-07 NOTE — TELEPHONE ENCOUNTER
JF,   Patient called about messages - states RNs keep closing MyChart so he can't respond    States his question is can he   Doesn't want to deal with medicare on this yearly   Also hasn't smoker for some time and based on the shouldiscreen.com site you sent him he's questioning this    States he read the 5 out of 1000 are positive and 13 out of 1000 are false positive  Said it's more likely he be falsely positive than truly positive    Quit smoking 12 years ago at age 59   Was a smoker for 44 years prior to that     Please advise  Thanks,  Chrissy PLAZA, RN

## 2021-10-07 NOTE — TELEPHONE ENCOUNTER
Yep, that's typical.  A lot of false positives which is why we talk about it.  If he wants to skip it, that is fine.  But given the length of smoking, he does qualify to get it done annually.

## 2021-10-07 NOTE — TELEPHONE ENCOUNTER
Spoke to [facility] dept.  And faxed order to them at [phone].    Clinic phone number on face sheet.    Left vm to call back or check Mychart.  He can just call them to get scheduled.    Carmina Milton RN

## 2021-10-07 NOTE — TELEPHONE ENCOUNTER
Ron called today.   He undergoes a low dose CT Lung cancer screening w/o contract once a year. Pt has questions if he could do the scan every two years, or should he continue with once a year scans. His last scan was 9/22/2020.     Patient can be contacted through [employer] or telephone: [phone].     Please advise.   Thanks!  Alicia BHAKTA

## 2021-10-23 ENCOUNTER — IMMUNIZATION (OUTPATIENT)
Dept: FAMILY MEDICINE | Facility: CLINIC | Age: 71
End: 2021-10-23
Payer: COMMERCIAL

## 2021-10-23 VITALS — TEMPERATURE: 97.1 F

## 2021-10-23 DIAGNOSIS — Z23 NEED FOR PROPHYLACTIC VACCINATION AND INOCULATION AGAINST INFLUENZA: Primary | ICD-10-CM

## 2021-10-23 PROCEDURE — 90662 IIV NO PRSV INCREASED AG IM: CPT

## 2021-10-23 PROCEDURE — 99207 PR NO CHARGE NURSE ONLY: CPT

## 2021-10-23 PROCEDURE — G0008 ADMIN INFLUENZA VIRUS VAC: HCPCS

## 2021-11-17 DIAGNOSIS — F34.1 DYSTHYMIA: ICD-10-CM

## 2021-11-17 RX ORDER — CITALOPRAM HYDROBROMIDE 40 MG/1
40 TABLET ORAL DAILY
Qty: 90 TABLET | Refills: 0 | Status: SHIPPED | OUTPATIENT
Start: 2021-11-17 | End: 2022-05-23

## 2021-11-17 NOTE — TELEPHONE ENCOUNTER
Reason for Call:  Medication or medication refill:    Do you use a [facility]?  Name of the pharmacy and phone number for the current request:  [address]    Name of the medication requested: citalopram (CELEXA) 40 MG tablet    Other request: NA     Can we leave a detailed message on this number? YES    Phone number patient can be reached at: Home number on file [phone] (home)    Best Time: any    Call taken on 11/17/2021 at 10:48 AM by Alicia Mann

## 2022-03-21 DIAGNOSIS — G47.00 INSOMNIA, UNSPECIFIED TYPE: ICD-10-CM

## 2022-03-22 RX ORDER — TRAZODONE HYDROCHLORIDE 50 MG/1
TABLET, FILM COATED ORAL
Qty: 270 TABLET | Refills: 1 | Status: SHIPPED | OUTPATIENT
Start: 2022-03-22 | End: 2022-08-09

## 2022-03-22 NOTE — TELEPHONE ENCOUNTER
"Requested Prescriptions   Signed Prescriptions Disp Refills    traZODone (DESYREL) 50 MG tablet 270 tablet 1     Sig: TAKE 2-3 TABLETS AT BEDTIME       Serotonin Modulators Passed - 3/21/2022  2:04 PM        Passed - Recent (12 mo) or future (30 days) visit within the authorizing provider's specialty     Patient has had an office visit with the authorizing provider or a provider within the authorizing providers department within the previous 12 mos or has a future within next 30 days. See \"Patient Info\" tab in inbasket, or \"Choose Columns\" in Meds & Orders section of the refill encounter.              Passed - Medication is active on med list        Passed - Patient is age 18 or older           Sil Guido RN  [facility]     "

## 2022-03-31 DIAGNOSIS — G47.00 INSOMNIA, UNSPECIFIED TYPE: ICD-10-CM

## 2022-03-31 RX ORDER — GABAPENTIN 100 MG/1
CAPSULE ORAL
Qty: 60 CAPSULE | Refills: 5 | Status: SHIPPED | OUTPATIENT
Start: 2022-03-31 | End: 2023-03-03

## 2022-03-31 NOTE — TELEPHONE ENCOUNTER
JF,    Patient called requesting Gabapentin refill.    Patient is scheduled to establish care with JS on Monday 6/6.  Last OV 9/13/21 -  Physical    Thank you,  Agata Calderon RN

## 2022-05-23 ENCOUNTER — MYC MEDICAL ADVICE (OUTPATIENT)
Dept: FAMILY MEDICINE | Facility: CLINIC | Age: 72
End: 2022-05-23
Payer: COMMERCIAL

## 2022-05-23 ENCOUNTER — MYC REFILL (OUTPATIENT)
Dept: FAMILY MEDICINE | Facility: CLINIC | Age: 72
End: 2022-05-23
Payer: COMMERCIAL

## 2022-05-23 DIAGNOSIS — F34.1 DYSTHYMIA: ICD-10-CM

## 2022-05-23 RX ORDER — CITALOPRAM HYDROBROMIDE 40 MG/1
40 TABLET ORAL DAILY
Qty: 90 TABLET | Refills: 0 | Status: SHIPPED | OUTPATIENT
Start: 2022-05-23 | End: 2022-08-14

## 2022-05-23 NOTE — TELEPHONE ENCOUNTER
**Scheduled to see Dr. Wegener 6/6/22 to establish care    Routing refill request to provider for review/approval because:  PHQ 9/9/2021   PHQ-9 Total Score 3   Q9: Thoughts of better off dead/self-harm past 2 weeks Not at all   Some encounter information is confidential and restricted. Go to Review Flowsheets activity to see all data.      Renita Mota RN

## 2022-06-03 NOTE — PROGRESS NOTES
"  Assessment & Plan     Benign prostatic hyperplasia with nocturia  Wean flomax to one pill/night if no change then stop in two weeks.  If worse symptoms re-start  Urology referral to consider TURP type procedure.     - Adult Urology Referral; Future    Dysthymia  Add buproprion back to boost mood/energy.  Had been on for several years in the past without side effects.     Follow up 1-5 months recommended, he will let me know if desires otherwise follow up one year.   - buPROPion (WELLBUTRIN XL) 150 MG 24 hr tablet; Take 1 tablet (150 mg) by mouth every morning    Need for hepatitis C screening test  Agrees to screen.     Additional yearly labs as below.     - Hepatitis C Screen Reflex to HCV RNA Quant and Genotype; Future  - Hepatitis C Screen Reflex to HCV RNA Quant and Genotype    CARDIOVASCULAR SCREENING; LDL GOAL LESS THAN 160    - Lipid panel reflex to direct LDL Fasting; Future  - Lipid panel reflex to direct LDL Fasting    Screening for prostate cancer    - Prostate Specific Antigen Screen; Future  - Prostate Specific Antigen Screen    Medication management    - Comprehensive metabolic panel; Future  - Comprehensive metabolic panel    Alcohol dependence in early full remission (H)  Negative screen last wellness exam.     Screening for AAA (abdominal aortic aneurysm)    - Abdominal Aortic Aneurysm Screening/Tracking    History of smoking  Completed (see outside records).   - Abdominal Aortic Aneurysm Screening/Tracking    H/o smoking:  Due for lung cancer screen again October/november.  Plans to do at [facility], order placed.       35 minutes spent on the date of the encounter doing chart review, history and exam, negotiating and explaining the plan with the patient, documentation and further activities as noted above.                BMI:   Estimated body mass index is 29.62 kg/m  as calculated from the following:    Height as of this encounter: 1.748 m (5' 8.8\").    Weight as of this encounter: 90.4 kg (199 lb " "6.4 oz).           Return in about 1 year (around 6/6/2023) for wellness/physical.    Joel Daniel Wegener, MD  [facility] Varghese is a 71 year old who presents for the following health issues     History of Present Illness       Reason for visit:  Establish care for new Dr    He eats 4 or more servings of fruits and vegetables daily.He consumes 0 sweetened beverage(s) daily.He exercises with enough effort to increase his heart rate 30 to 60 minutes per day.  He exercises with enough effort to increase his heart rate 5 days per week.   He is taking medications regularly.    Today's PHQ-9         PHQ-9 Total Score: 9    PHQ-9 Q9 Thoughts of better off dead/self-harm past 2 weeks :   Not at all    How difficult have these problems made it for you to do your work, take care of things at home, or get along with other people: Somewhat difficult       ESTABLISH CARE/TRANSFER OF CARE    Two main  Issues to discuss today:    1.  Benign prostatic hypertrophy/nocturia.  Has weaned flomax up to two pills/nightly.  Has not helped.  Urinating 3-4 times/night.  Wonders what other options.     2.  Mood h/o dysthymia/depression in past on citalopram.  Decreased energy, motivation, decreased desire to exercise/bike.  Not suicidal. Not severe but not normal self.  No specific .         Review of Systems         Objective    /69   Pulse 65   Temp 97.3  F (36.3  C)   Ht 1.748 m (5' 8.8\")   Wt 90.4 kg (199 lb 6.4 oz)   SpO2 98%   BMI 29.62 kg/m    Body mass index is 29.62 kg/m .  Physical Exam   MENTAL STATUS EXAM  Appearance: appropriate  Attitude: cooperative  Behavior: normal  Eyre Contact: normal  Speech: normal  Orientation: oreinted to person , place, time and situation  Mood:  admits sadness and anxiety most of time  Affect: Mood Congruient  Thought Process: clear  Suicidal Ideation: reports no  thoughts, no intention  Hallucination: no                  Lung Cancer Screening " Shared Decision Making Visit     Ezio Bassett, a 71 year old male, is eligible for lung cancer screening    History   Smoking Status     Former Smoker     Packs/day: 0.00     Years: 40.00     Types: Cigarettes     Start date: 10/1/1966     Quit date: 5/10/2008   Smokeless Tobacco     Never Used       I have discussed with patient the risks and benefits of screening for lung cancer with low-dose CT.     The risks include:    radiation exposure: one low dose chest CT has as much ionizing radiation as about 15 chest x-rays, or 6 months of background radiation living in [place]      false positives: most findings/nodules are NOT cancer, but some might still require additional diagnostic evaluation, including biopsy    over-diagnosis: some slow growing cancers that might never have been clinically significant will be detected and treated unnecessarily     The benefit of early detection of lung cancer is contingent upon adherence to annual screening or more frequent follow up if indicated.     Furthermore, to benefit from screening, Ezio must be willing and able to undergo diagnostic procedures, if indicated. Although no specific guide is available for determining severity of comorbidities, it is reasonable to withhold screening in patients who have greater mortality risk from other diseases.     We did discuss that the best way to prevent lung cancer is to not smoke.    Some patients may value a numeric estimation of lung cancer risk when evaluating if lung cancer screening is right for them, here is one calculator:    ShouldIScreen

## 2022-06-05 ASSESSMENT — PATIENT HEALTH QUESTIONNAIRE - PHQ9
SUM OF ALL RESPONSES TO PHQ QUESTIONS 1-9: 9
10. IF YOU CHECKED OFF ANY PROBLEMS, HOW DIFFICULT HAVE THESE PROBLEMS MADE IT FOR YOU TO DO YOUR WORK, TAKE CARE OF THINGS AT HOME, OR GET ALONG WITH OTHER PEOPLE: SOMEWHAT DIFFICULT
SUM OF ALL RESPONSES TO PHQ QUESTIONS 1-9: 9

## 2022-06-06 ENCOUNTER — PRE VISIT (OUTPATIENT)
Dept: UROLOGY | Facility: CLINIC | Age: 72
End: 2022-06-06

## 2022-06-06 ENCOUNTER — OFFICE VISIT (OUTPATIENT)
Dept: FAMILY MEDICINE | Facility: CLINIC | Age: 72
End: 2022-06-06
Payer: COMMERCIAL

## 2022-06-06 VITALS
TEMPERATURE: 97.3 F | WEIGHT: 199.4 LBS | HEIGHT: 69 IN | DIASTOLIC BLOOD PRESSURE: 69 MMHG | BODY MASS INDEX: 29.53 KG/M2 | OXYGEN SATURATION: 98 % | SYSTOLIC BLOOD PRESSURE: 113 MMHG | HEART RATE: 65 BPM

## 2022-06-06 DIAGNOSIS — F34.1 DYSTHYMIA: ICD-10-CM

## 2022-06-06 DIAGNOSIS — Z11.59 NEED FOR HEPATITIS C SCREENING TEST: ICD-10-CM

## 2022-06-06 DIAGNOSIS — F10.21 ALCOHOL DEPENDENCE IN EARLY FULL REMISSION (H): ICD-10-CM

## 2022-06-06 DIAGNOSIS — Z79.899 MEDICATION MANAGEMENT: ICD-10-CM

## 2022-06-06 DIAGNOSIS — R35.1 BENIGN PROSTATIC HYPERPLASIA WITH NOCTURIA: Primary | ICD-10-CM

## 2022-06-06 DIAGNOSIS — Z12.5 SCREENING FOR PROSTATE CANCER: ICD-10-CM

## 2022-06-06 DIAGNOSIS — Z87.891 HISTORY OF SMOKING: ICD-10-CM

## 2022-06-06 DIAGNOSIS — Z13.6 CARDIOVASCULAR SCREENING; LDL GOAL LESS THAN 160: ICD-10-CM

## 2022-06-06 DIAGNOSIS — Z87.891 PERSONAL HISTORY OF TOBACCO USE: ICD-10-CM

## 2022-06-06 DIAGNOSIS — N40.1 BENIGN PROSTATIC HYPERPLASIA WITH NOCTURIA: Primary | ICD-10-CM

## 2022-06-06 DIAGNOSIS — Z13.6 SCREENING FOR AAA (ABDOMINAL AORTIC ANEURYSM): ICD-10-CM

## 2022-06-06 PROBLEM — F33.41 RECURRENT MAJOR DEPRESSIVE DISORDER, IN PARTIAL REMISSION (H): Status: RESOLVED | Noted: 2021-09-13 | Resolved: 2022-06-06

## 2022-06-06 PROCEDURE — G0103 PSA SCREENING: HCPCS | Performed by: FAMILY MEDICINE

## 2022-06-06 PROCEDURE — 99214 OFFICE O/P EST MOD 30 MIN: CPT | Performed by: FAMILY MEDICINE

## 2022-06-06 PROCEDURE — 80061 LIPID PANEL: CPT | Performed by: FAMILY MEDICINE

## 2022-06-06 PROCEDURE — 36415 COLL VENOUS BLD VENIPUNCTURE: CPT | Performed by: FAMILY MEDICINE

## 2022-06-06 PROCEDURE — G0296 VISIT TO DETERM LDCT ELIG: HCPCS | Performed by: FAMILY MEDICINE

## 2022-06-06 PROCEDURE — 86803 HEPATITIS C AB TEST: CPT | Performed by: FAMILY MEDICINE

## 2022-06-06 PROCEDURE — 80053 COMPREHEN METABOLIC PANEL: CPT | Performed by: FAMILY MEDICINE

## 2022-06-06 RX ORDER — BUPROPION HYDROCHLORIDE 150 MG/1
150 TABLET ORAL EVERY MORNING
Qty: 90 TABLET | Refills: 3 | Status: SHIPPED | OUTPATIENT
Start: 2022-06-06 | End: 2022-10-17

## 2022-06-06 ASSESSMENT — PATIENT HEALTH QUESTIONNAIRE - PHQ9
SUM OF ALL RESPONSES TO PHQ QUESTIONS 1-9: 9
10. IF YOU CHECKED OFF ANY PROBLEMS, HOW DIFFICULT HAVE THESE PROBLEMS MADE IT FOR YOU TO DO YOUR WORK, TAKE CARE OF THINGS AT HOME, OR GET ALONG WITH OTHER PEOPLE: SOMEWHAT DIFFICULT

## 2022-06-06 NOTE — PROGRESS NOTES
Assessment and Plan:     Assessment: 71 year old male with a history of BPH with mixed LUTS, including frequency, slow stream and nocturia, with no improvement from Flomax. Random bladder scan ~1 hour after voiding 63 mL. Prostate only mildly enlarged on SHARRON. We discussed that exam and symptoms suggest possible more of a bladder etiology than prostate, likely OAB. He is interested in pursuing a prostate procedure for his symptoms, if indicated. However, I recommend we pursue an anticholinergic trial now to see how he responds first. He agrees with this plan.     Plan:  -OK to taper off Flomax  -Start oxybutynin XL 10 mg daily.  -Follow up with me in 4-6 weeks for a virtual visit to touch base on symptoms. If no improvement from oxybutynin, will refer for cystoscopy.       Goldie Bustamante, CNP  Department of Urology           Chief Complaint:   LUTS         History of Present Illness:    Ezio Bassett is a 71 year old male with a history of BPH with frequency and nocturia x3-4 despite Flomax 0.8 mg daily. He does note that he drinks a lot of fluid daily, about 64 oz. He also has some coffee daily because he is tired from not sleeping. He has tried cutting back on fluid before bed but it has not helped.   His urine flow is also very slow, though he does feel he empties completely. It takes a while for dribbling to stop after he is done. Denies urgency, though often will need to find a restroom while he is away from home for 1-2 hours.     He had comprehensive testing at [place] about 10 years ago, including a sleep study, which was negative for sleep apnea.          Past Medical History:     Past Medical History:   Diagnosis Date     Anxiety      Arthritis      Deaf, right     4 years old     Depressive disorder      High cholesterol      HTN (hypertension)      LVH (left ventricular hypertrophy)             Past Surgical History:     Past Surgical History:   Procedure Laterality Date     BIOPSY SKIN  "(LOCATION)       EYE SURGERY      Tube from left eye to nose for draining tears.     TONSILLECTOMY              Medications     Current Outpatient Medications   Medication     buPROPion (WELLBUTRIN XL) 150 MG 24 hr tablet     Cholecalciferol (VITAMIN D) 2000 UNIT CAPS     citalopram (CELEXA) 40 MG tablet     gabapentin (NEURONTIN) 100 MG capsule     ibuprofen (ADVIL/MOTRIN) 800 MG tablet     ipratropium (ATROVENT) 0.06 % nasal spray     ketoconazole (NIZORAL) 2 % external cream     Melatonin 2.5 MG CAPS     Multiple Vitamins-Minerals (MULTIVITAMIN ADULT PO)     pravastatin (PRAVACHOL) 40 MG tablet     tamsulosin (FLOMAX) 0.4 MG capsule     traZODone (DESYREL) 50 MG tablet     triamcinolone (KENALOG) 0.1 % external cream     No current facility-administered medications for this visit.            Allergies:   Patient has no known allergies.         Review of Systems:  From intake questionnaire   Negative 14 system review except as noted on HPI, nurse's note.         Physical Exam:   Patient is a 71 year old  male   Vitals: Blood pressure 112/73, pulse 68, height 1.753 m (5' 9\"), weight 88 kg (194 lb).  General Appearance Adult: Alert, no acute distress, oriented  Lungs: no respiratory distress, or pursed lip breathing  Heart: No obvious jugular venous distension present  Abdomen: soft, nontender, no organomegaly or masses, Body mass index is 28.65 kg/m .  : SHARRON anodular, symmetric; mildly-enlarged     Uroflow and Post-Void Residual by Bladder Scan     PVR: 63 mL      Labs and Pathology:    I personally reviewed all applicable laboratory data and went over findings with patient  Significant for:    CBC RESULTS:  Recent Labs   Lab Test 06/20/19  1222 07/16/18  1500   WBC 8.3 7.4   HGB 13.9* 13.9*    167        BMP RESULTS:  Recent Labs   Lab Test 09/08/21  1111 09/17/20  1215 07/22/19  1532 06/20/19  1222 07/16/18  1500    138 142 139 141   POTASSIUM 4.6 4.5 4.8 4.4 4.5   CHLORIDE 109 105 110* 107 107 "   CO2 27 24 24 23 22   ANIONGAP 2* 9 8 9 12   * 89 100 94 88   BUN 15 18 21 18 16   CR 1.17 1.17 1.07 0.91 0.90   GFRESTIMATED 62 >60 >60 >60 >60   GFRESTBLACK  --  >60 >60 >60 >60   PADILLA 8.0* 8.8 9.0 9.8 9.4       PSA RESULTS  Prostate Specific Antigen Screen   Date Value Ref Range Status   09/08/2021 2.26 0.00 - 4.00 ug/L Final   09/17/2020 2.0 0.0 - 6.5 ng/mL Final   07/22/2019 2.4 0.0 - 4.5 ng/mL Final   07/16/2018 2.8 0.0 - 4.5 ng/mL Final   06/22/2017 3.3 0.0 - 4.5 ng/mL Final   06/14/2016 2.4 0.0 - 4.5 ng/mL Final   03/12/2015 2.0 0.0 - 4.5 ng/mL Final

## 2022-06-06 NOTE — TELEPHONE ENCOUNTER
MEDICAL RECORDS REQUEST   [facility] Clinic  [address] [place] [address]  PHONE: [phone]  Fax: [phone]        FUTURE VISIT INFORMATION                                                   Ezio Harding Serjio, : 1950 scheduled for future visit at [facility]    APPOINTMENT INFORMATION:    Date: 2022    Provider: Goldie Bustamante CNP    Reason for Visit/Diagnosis: BPH scheduled with patient     REFERRAL INFORMATION:    Referring provider:  Wegener, Joel Daniel Irwin, MD    Referring providers clinic:  [facility]    RECORDS REQUESTED FOR VISIT                                                     NOTES  STATUS/DETAILS   OFFICE NOTE from referring provider  yes, 2022 -- Wegener, Joel Daniel Irwin, MD in  FAMILY PRACTICE   OFFICE NOTE from other specialist   yes, 2021 -- Jhon Vang MD -- [facility]   MEDICATION LIST  yes   LABS     PSA (LAB)  yes     PRE-VISIT CHECKLIST      Record collection complete Yes   Appointment appropriately scheduled           (right time/right provider) Yes   Joint diagnostic appointment coordinated correctly          (ensure right order & amount of time) Yes   MyChart activation Yes   Questionnaire complete If no, please explain pending

## 2022-06-06 NOTE — PATIENT INSTRUCTIONS
Lung Cancer Screening   Frequently Asked Questions  If you are at high-risk for lung cancer, getting screened with low-dose computed tomography (LDCT) every year can help save your life. This handout offers answers to some of the most common questions about lung cancer screening. If you have other questions, please call [phone] ([phone]).     What is it?  Lung cancer screening uses special X-ray technology to create an image of your lung tissue. The exam is quick and easy and takes less than 10 seconds. We don t give you any medicine or use any needles. You can eat before and after the exam. You don t need to change your clothes as long as the clothing on your chest doesn t contain metal. But, you do need to be able to hold your breath for at least 6 seconds during the exam.    What is the goal of lung cancer screening?  The goal of lung cancer screening is to save lives. Many times, lung cancer is not found until a person starts having physical symptoms. Lung cancer screening can help detect lung cancer in the earliest stages when it may be easier to treat.    Who should be screened for lung cancer?  We suggest lung cancer screening for anyone who is at high-risk for lung cancer. You are in the high-risk group if you:      are between the ages of 55 and 79, and    have smoked at least 1 pack of cigarettes a day for 20 or more years, and    still smoke or have quit within the past 15 years.    However, if you have a new cough or shortness of breath, you should talk to your doctor before being screened.    Why does it matter if I have symptoms?  Certain symptoms can be a sign that you have a condition in your lungs that should be checked and treated by your doctor. These symptoms include fever, chest pain, a new or changing cough, shortness of breath that you have never felt before, coughing up blood or unexplained weight loss. Having any of these symptoms can greatly affect the results of lung  cancer screening.       Should all smokers get an LDCT lung cancer screening exam?  It depends. Lung cancer screening is for a very specific group of men and women who have a history of heavy smoking over a long period of time (see  Who should be screened for lung cancer  above).  I am in the high-risk group, but have been diagnosed with cancer in the past. Is LDCT lung cancer screening right for me?  In some cases, you should not have LDCT lung screening, such as when your doctor is already following your cancer with CT scan studies. Your doctor will help you decide if LDCT lung screening is right for you.  Do I need to have a screening exam every year?  Yes. If you are in the high-risk group described earlier, you should get an LDCT lung cancer screening exam every year until you are 79, or are no longer willing or able to undergo screening and possible procedures to diagnose and treat lung cancer.  How effective is LDCT at preventing death from lung cancer?  Studies have shown that LDCT lung cancer screening can lower the risk of death from lung cancer by 20 percent in people who are at high-risk.  What are the risks?  There are some risks and limitations of LDCT lung cancer screening. We want to make sure you understand the risks and benefits, so please let us know if you have any questions. Your doctor may want to talk with you more about these risks.    Radiation exposure: As with any exam that uses radiation, there is a very small increased risk of cancer. The amount of radiation in LDCT is small--about the same amount a person would get from a mammogram. Your doctor orders the exam when he or she feels the potential benefits outweigh the risks.    False negatives: No test is perfect, including LDCT. It is possible that you may have a medical condition, including lung cancer, that is not found during your exam. This is called a false negative result.    False positives and more testing: LDCT very often finds  something in the lung that could be cancer, but in fact is not. This is called a false positive result. False positive tests often cause anxiety. To make sure these findings are not cancer, you may need to have more tests. These tests will be done only if you give us permission. Sometimes patients need a treatment that can have side effects, such as a biopsy. For more information on false positives, see  What can I expect from the results?     Findings not related to lung cancer: Your LDCT exam also takes pictures of areas of your body next to your lungs. In a very small number of cases, the CT scan will show an abnormal finding in one of these areas, such as your kidneys, adrenal glands, liver or thyroid. This finding may not be serious, but you may need more tests. Your doctor can help you decide what other tests you may need, if any.  What can I expect from the results?  About 1 out of 4 LDCT exams will find something that may need more tests. Most of the time, these findings are lung nodules. Lung nodules are very small collections of tissue in the lung. These nodules are very common, and the vast majority--more than 97 percent--are not cancer (benign). Most are normal lymph nodes or small areas of scarring from past infections.  But, if a small lung nodule is found to be cancer, the cancer can be cured more than 90 percent of the time. To know if the nodule is cancer, we may need to get more images before your next yearly screening exam. If the nodule has suspicious features (for example, it is large, has an odd shape or grows over time), we will refer you to a specialist for further testing.  Will my doctor also get the results?  Yes. Your doctor will get a copy of your results.  Is it okay to keep smoking now that there s a cancer screening exam?  No. Tobacco is one of the strongest cancer-causing agents. It causes not only lung cancer, but other cancers and cardiovascular (heart) diseases as well. The damage  caused by smoking builds over time. This means that the longer you smoke, the higher your risk of disease. While it is never too late to quit, the sooner you quit, the better.  Where can I find help to quit smoking?  The best way to prevent lung cancer is to stop smoking. If you have already quit smoking, congratulations and keep it up! For help on quitting smoking, please call [employer] at 6-491-QUITNOW (1-277.921.1345) or the American Cancer Society at 1-471.259.8314 to find local resources near you.  One-on-one health coaching:  If you d prefer to work individually with a health care provider on tobacco cessation, we offer:      Medication Therapy Management:  Our specially trained pharmacists work closely with you and your doctor to help you quit smoking.  Call [phone] or [phone] (toll free).

## 2022-06-07 ENCOUNTER — OFFICE VISIT (OUTPATIENT)
Dept: UROLOGY | Facility: CLINIC | Age: 72
End: 2022-06-07
Attending: FAMILY MEDICINE
Payer: COMMERCIAL

## 2022-06-07 VITALS
BODY MASS INDEX: 28.73 KG/M2 | SYSTOLIC BLOOD PRESSURE: 112 MMHG | HEART RATE: 68 BPM | WEIGHT: 194 LBS | HEIGHT: 69 IN | DIASTOLIC BLOOD PRESSURE: 73 MMHG

## 2022-06-07 DIAGNOSIS — N40.1 BENIGN PROSTATIC HYPERPLASIA WITH NOCTURIA: ICD-10-CM

## 2022-06-07 DIAGNOSIS — R35.1 BENIGN PROSTATIC HYPERPLASIA WITH NOCTURIA: ICD-10-CM

## 2022-06-07 DIAGNOSIS — R39.198 SLOWING OF URINARY STREAM: Primary | ICD-10-CM

## 2022-06-07 LAB
ALBUMIN SERPL-MCNC: 3.9 G/DL (ref 3.4–5)
ALP SERPL-CCNC: 71 U/L (ref 40–150)
ALT SERPL W P-5'-P-CCNC: 44 U/L (ref 0–70)
ANION GAP SERPL CALCULATED.3IONS-SCNC: 4 MMOL/L (ref 3–14)
AST SERPL W P-5'-P-CCNC: 28 U/L (ref 0–45)
BILIRUB SERPL-MCNC: 0.6 MG/DL (ref 0.2–1.3)
BUN SERPL-MCNC: 17 MG/DL (ref 7–30)
CALCIUM SERPL-MCNC: 8.8 MG/DL (ref 8.5–10.1)
CHLORIDE BLD-SCNC: 109 MMOL/L (ref 94–109)
CHOLEST SERPL-MCNC: 168 MG/DL
CO2 SERPL-SCNC: 26 MMOL/L (ref 20–32)
CREAT SERPL-MCNC: 0.99 MG/DL (ref 0.66–1.25)
FASTING STATUS PATIENT QL REPORTED: NO
GFR SERPL CREATININE-BSD FRML MDRD: 81 ML/MIN/1.73M2
GLUCOSE BLD-MCNC: 98 MG/DL (ref 70–99)
HDLC SERPL-MCNC: 49 MG/DL
LDLC SERPL CALC-MCNC: 99 MG/DL
NONHDLC SERPL-MCNC: 119 MG/DL
POTASSIUM BLD-SCNC: 4.6 MMOL/L (ref 3.4–5.3)
PROT SERPL-MCNC: 7 G/DL (ref 6.8–8.8)
PSA SERPL-MCNC: 2.14 UG/L (ref 0–4)
SODIUM SERPL-SCNC: 139 MMOL/L (ref 133–144)
TRIGL SERPL-MCNC: 98 MG/DL

## 2022-06-07 PROCEDURE — 51798 US URINE CAPACITY MEASURE: CPT | Performed by: NURSE PRACTITIONER

## 2022-06-07 PROCEDURE — 99203 OFFICE O/P NEW LOW 30 MIN: CPT | Mod: 25 | Performed by: NURSE PRACTITIONER

## 2022-06-07 RX ORDER — OXYBUTYNIN CHLORIDE 10 MG/1
10 TABLET, EXTENDED RELEASE ORAL DAILY
Qty: 30 TABLET | Refills: 3 | Status: SHIPPED | OUTPATIENT
Start: 2022-06-07 | End: 2022-12-28 | Stop reason: ALTCHOICE

## 2022-06-07 ASSESSMENT — PAIN SCALES - GENERAL: PAINLEVEL: NO PAIN (0)

## 2022-06-07 NOTE — LETTER
6/7/2022       RE: Ezio Bassett  [address] [facility] [address]     Dear Colleague,    Thank you for referring your patient, Ezio Bassett, to the [facility] [place] [facility]. Please see a copy of my visit note below.         Assessment and Plan:     Assessment: 71 year old male with a history of BPH with mixed LUTS, including frequency, slow stream and nocturia, with no improvement from Flomax. Random bladder scan ~1 hour after voiding 63 mL. Prostate only mildly enlarged on SHARRON. We discussed that exam and symptoms suggest possible more of a bladder etiology than prostate, likely OAB. He is interested in pursuing a prostate procedure for his symptoms, if indicated. However, I recommend we pursue an anticholinergic trial now to see how he responds first. He agrees with this plan.     Plan:  -OK to taper off Flomax  -Start oxybutynin XL 10 mg daily.  -Follow up with me in 4-6 weeks for a virtual visit to touch base on symptoms. If no improvement from oxybutynin, will refer for cystoscopy.       Goldie Bustamante, CNP  Department of Urology           Chief Complaint:   LUTS         History of Present Illness:    Ezio Bassett is a 71 year old male with a history of BPH with frequency and nocturia x3-4 despite Flomax 0.8 mg daily. He does note that he drinks a lot of fluid daily, about 64 oz. He also has some coffee daily because he is tired from not sleeping. He has tried cutting back on fluid before bed but it has not helped.   His urine flow is also very slow, though he does feel he empties completely. It takes a while for dribbling to stop after he is done. Denies urgency, though often will need to find a restroom while he is away from home for 1-2 hours.     He had comprehensive testing at [facility] about 10 years ago, including a sleep study, which was negative for sleep apnea.          Past Medical History:  "    Past Medical History:   Diagnosis Date     Anxiety      Arthritis      Deaf, right     4 years old     Depressive disorder      High cholesterol      HTN (hypertension)      LVH (left ventricular hypertrophy)             Past Surgical History:     Past Surgical History:   Procedure Laterality Date     BIOPSY SKIN (LOCATION)       EYE SURGERY      Tube from left eye to nose for draining tears.     TONSILLECTOMY              Medications     Current Outpatient Medications   Medication     buPROPion (WELLBUTRIN XL) 150 MG 24 hr tablet     Cholecalciferol (VITAMIN D) 2000 UNIT CAPS     citalopram (CELEXA) 40 MG tablet     gabapentin (NEURONTIN) 100 MG capsule     ibuprofen (ADVIL/MOTRIN) 800 MG tablet     ipratropium (ATROVENT) 0.06 % nasal spray     ketoconazole (NIZORAL) 2 % external cream     Melatonin 2.5 MG CAPS     Multiple Vitamins-Minerals (MULTIVITAMIN ADULT PO)     pravastatin (PRAVACHOL) 40 MG tablet     tamsulosin (FLOMAX) 0.4 MG capsule     traZODone (DESYREL) 50 MG tablet     triamcinolone (KENALOG) 0.1 % external cream     No current facility-administered medications for this visit.            Allergies:   Patient has no known allergies.         Review of Systems:  From intake questionnaire   Negative 14 system review except as noted on HPI, nurse's note.         Physical Exam:   Patient is a 71 year old  male   Vitals: Blood pressure 112/73, pulse 68, height 1.753 m (5' 9\"), weight 88 kg (194 lb).  General Appearance Adult: Alert, no acute distress, oriented  Lungs: no respiratory distress, or pursed lip breathing  Heart: No obvious jugular venous distension present  Abdomen: soft, nontender, no organomegaly or masses, Body mass index is 28.65 kg/m .  : SHARRON anodular, symmetric; mildly-enlarged     Uroflow and Post-Void Residual by Bladder Scan     PVR: 63 mL      Labs and Pathology:    I personally reviewed all applicable laboratory data and went over findings with patient  Significant for:    CBC " RESULTS:  Recent Labs   Lab Test 06/20/19  1222 07/16/18  1500   WBC 8.3 7.4   HGB 13.9* 13.9*    167        BMP RESULTS:  Recent Labs   Lab Test 09/08/21  1111 09/17/20  1215 07/22/19  1532 06/20/19  1222 07/16/18  1500    138 142 139 141   POTASSIUM 4.6 4.5 4.8 4.4 4.5   CHLORIDE 109 105 110* 107 107   CO2 27 24 24 23 22   ANIONGAP 2* 9 8 9 12   * 89 100 94 88   BUN 15 18 21 18 16   CR 1.17 1.17 1.07 0.91 0.90   GFRESTIMATED 62 >60 >60 >60 >60   GFRESTBLACK  --  >60 >60 >60 >60   PADILLA 8.0* 8.8 9.0 9.8 9.4       PSA RESULTS  Prostate Specific Antigen Screen   Date Value Ref Range Status   09/08/2021 2.26 0.00 - 4.00 ug/L Final   09/17/2020 2.0 0.0 - 6.5 ng/mL Final   07/22/2019 2.4 0.0 - 4.5 ng/mL Final   07/16/2018 2.8 0.0 - 4.5 ng/mL Final   06/22/2017 3.3 0.0 - 4.5 ng/mL Final   06/14/2016 2.4 0.0 - 4.5 ng/mL Final   03/12/2015 2.0 0.0 - 4.5 ng/mL Final

## 2022-06-07 NOTE — PATIENT INSTRUCTIONS
UROLOGY CLINIC VISIT PATIENT INSTRUCTIONS    -OK to taper off the Flomax.   -Start the oxybutynin XL 10 mg daily.   -Follow up with me for a virtual visit in 4-6 weeks.     If you have any issues, questions or concerns in the meantime, do not hesitate to contact us at [phone] or via [employer].     Goldie Bustamante, CNP  Department of Urology

## 2022-06-07 NOTE — NURSING NOTE
"Chief Complaint   Patient presents with     Consult For     BPH        Blood pressure 112/73, pulse 68, height 1.753 m (5' 9\"), weight 88 kg (194 lb). Body mass index is 28.65 kg/m .    Patient Active Problem List   Diagnosis     HTN (hypertension)     Hyperlipidemia     Insomnia     LVH (left ventricular hypertrophy)     Alcohol dependence in early full remission (H)     Ex-smoker     Dysthymia       No Known Allergies    Current Outpatient Medications   Medication Sig Dispense Refill     buPROPion (WELLBUTRIN XL) 150 MG 24 hr tablet Take 1 tablet (150 mg) by mouth every morning 90 tablet 3     Cholecalciferol (VITAMIN D) 2000 UNIT CAPS Take  by mouth.       citalopram (CELEXA) 40 MG tablet Take 1 tablet (40 mg) by mouth daily 90 tablet 0     gabapentin (NEURONTIN) 100 MG capsule TAKE 1 TO 2 TABLETS BY MOUTH AT BEDTIME 60 capsule 5     ibuprofen (ADVIL/MOTRIN) 800 MG tablet TAKE 1 TABLET BY MOUTH UP TO 3 TIMES A DAY AS NEEDED FOR PAIN 90 tablet 3     ipratropium (ATROVENT) 0.06 % nasal spray Spray 1 spray in nostril       ketoconazole (NIZORAL) 2 % external cream Apply topically daily (Patient taking differently: Apply topically daily Daily as needed) 30 g 3     Melatonin 2.5 MG CAPS Take by mouth At Bedtime       Multiple Vitamins-Minerals (MULTIVITAMIN ADULT PO) Take 1 tablet by mouth       pravastatin (PRAVACHOL) 40 MG tablet Take 1 tablet (40 mg) by mouth daily 90 tablet 3     tamsulosin (FLOMAX) 0.4 MG capsule Take 1 capsule (0.4 mg) by mouth daily 90 capsule 3     traZODone (DESYREL) 50 MG tablet TAKE 2-3 TABLETS AT BEDTIME 270 tablet 1     triamcinolone (KENALOG) 0.1 % external cream Apply topically 2 times daily (Patient taking differently: Apply topically 2 times daily When needed) 453 g 1       Social History     Tobacco Use     Smoking status: Former Smoker     Packs/day: 0.00     Years: 40.00     Pack years: 0.00     Types: Cigarettes     Start date: 10/1/1966     Quit date: 5/10/2008     Years since " quittin.0     Smokeless tobacco: Never Used   Vaping Use     Vaping Use: Never used   Substance Use Topics     Alcohol use: Not Currently     Comment: drinks daily approx 3 drinks of vodka, last drink at 2200 yesterday     Drug use: No       Shelly Horne  2022  8:36 AM

## 2022-06-08 LAB — HCV AB SERPL QL IA: NONREACTIVE

## 2022-06-16 ENCOUNTER — PRE VISIT (OUTPATIENT)
Dept: UROLOGY | Facility: CLINIC | Age: 72
End: 2022-06-16
Payer: COMMERCIAL

## 2022-06-16 NOTE — TELEPHONE ENCOUNTER
Reason for visit: symptom check      Dx/Hx/Sx: BPH w/LUTS    Records/imaging/labs/orders: in epic    At Rooming: video visit

## 2022-07-12 ENCOUNTER — VIRTUAL VISIT (OUTPATIENT)
Dept: UROLOGY | Facility: CLINIC | Age: 72
End: 2022-07-12
Payer: COMMERCIAL

## 2022-07-12 DIAGNOSIS — N40.1 BENIGN PROSTATIC HYPERPLASIA WITH WEAK URINARY STREAM: Primary | ICD-10-CM

## 2022-07-12 DIAGNOSIS — R39.12 BENIGN PROSTATIC HYPERPLASIA WITH WEAK URINARY STREAM: Primary | ICD-10-CM

## 2022-07-12 PROCEDURE — 99214 OFFICE O/P EST MOD 30 MIN: CPT | Mod: 95 | Performed by: NURSE PRACTITIONER

## 2022-07-12 ASSESSMENT — PAIN SCALES - GENERAL: PAINLEVEL: NO PAIN (0)

## 2022-07-12 NOTE — LETTER
7/12/2022       RE: Ezio Bassett  [address]  [facility] [address]     Dear Colleague,    Thank you for referring your patient, Ezio Bassett, to the [facility] [place] at [facility]. Please see a copy of my visit note below.    Abimael is a 71 year old who is being evaluated via a billable video visit.      How would you like to obtain your AVS? MyChart  If the video visit is dropped, the invitation should be resent by: Text to cell phone: [phone]  Will anyone else be joining your video visit? No    Video-Visit Details    Video Start Time: 10:09 AM    Type of service:  Video Visit    Video End Time: 10:17 AM    Originating Location (pt. Location): Home    Distant Location (provider location):  [facility] [place]     Platform used for Video Visit: [employer]       Ezio Bassett complains of   Chief Complaint   Patient presents with     RECHECK     BPH follow up       Assessment/Plan:  71 year old male with a history of BPH with weak stream and nocturia x3-4, with no improvement from trials of Flomax 0.8 mg and oxybutynin XL 10 mg. We discussed cystoscopy in brief at our last visit to determine degree of prostatic obstruction and need for an outlet procedure and he is interested in moving forward with this now.   -Schedule cystoscopy with the next available urologist between Dr. Rodriguez and Dr. Schumacher.     Goldie Bustamante, CNP  Department of Urology      Subjective:   71 year old male with a history of BPH with nocturia x3-4 despite Flomax 0.8 mg daily. At our last visit one month ago, his PVR was low at 63 mL. Mild prostate enlargement on SHARRON. Symptoms suspected to be OAB and he was started on a trial of oxybutynin.     Today, he states that he took one month of the oxybutynin and did not feel like it was helping, so he stopped it. His stream is currently very weak and takes a long time to  empty. He continues to get up 3-4 times per night to void, and this is bothersome. He is interested in pursuing additional testing, as we discussed at our last visit.        Objective:     Exam:   Patient is a 71 year old male   No vital signs obtained due to virtual visit.  General Appearance: Well groomed, hygenic  Respiratory: No cough, no respiratory distress or labored breathing  Musculoskeletal: Grossly normal with no gross deficits  Skin: No discoloration or apparent rashes  Neurologic: No tremors  Psychiatric: Alert and oriented  Further examination is deferred due to the nature of our visit.

## 2022-07-12 NOTE — PATIENT INSTRUCTIONS
UROLOGY CLINIC VISIT PATIENT INSTRUCTIONS    -Schedule a cystoscopy with the next available urologist between Dr. Rodriguez and Dr. Schumacher.     If you have any issues, questions or concerns in the meantime, do not hesitate to contact us at [phone] or via [employer].     Goldie Bustamante, CNP  Department of Urology

## 2022-07-12 NOTE — PROGRESS NOTES
Abimael is a 71 year old who is being evaluated via a billable video visit.      How would you like to obtain your AVS? MyChart  If the video visit is dropped, the invitation should be resent by: Text to cell phone: [phone]  Will anyone else be joining your video visit? No    Video-Visit Details    Video Start Time: 10:09 AM    Type of service:  Video Visit    Video End Time: 10:17 AM    Originating Location (pt. Location): Home    Distant Location (provider location):  [facility] [place]     Platform used for Video Visit: Julissa Bassett complains of   Chief Complaint   Patient presents with     RECHECK     BPH follow up       Assessment/Plan:  71 year old male with a history of BPH with weak stream and nocturia x3-4, with no improvement from trials of Flomax 0.8 mg and oxybutynin XL 10 mg. We discussed cystoscopy in brief at our last visit to determine degree of prostatic obstruction and need for an outlet procedure and he is interested in moving forward with this now.   -Schedule cystoscopy with the next available urologist between Dr. Rodriguez and Dr. Schumacher.     Goldie Bustamante, CNP  Department of Urology      Subjective:   71 year old male with a history of BPH with nocturia x3-4 despite Flomax 0.8 mg daily. At our last visit one month ago, his PVR was low at 63 mL. Mild prostate enlargement on SHARRON. Symptoms suspected to be OAB and he was started on a trial of oxybutynin.     Today, he states that he took one month of the oxybutynin and did not feel like it was helping, so he stopped it. His stream is currently very weak and takes a long time to empty. He continues to get up 3-4 times per night to void, and this is bothersome. He is interested in pursuing additional testing, as we discussed at our last visit.        Objective:     Exam:   Patient is a 71 year old male   No vital signs obtained due to virtual visit.  General Appearance: Well groomed,  hygenic  Respiratory: No cough, no respiratory distress or labored breathing  Musculoskeletal: Grossly normal with no gross deficits  Skin: No discoloration or apparent rashes  Neurologic: No tremors  Psychiatric: Alert and oriented  Further examination is deferred due to the nature of our visit.

## 2022-07-21 ENCOUNTER — TELEPHONE (OUTPATIENT)
Dept: UROLOGY | Facility: CLINIC | Age: 72
End: 2022-07-21

## 2022-08-08 DIAGNOSIS — G47.00 INSOMNIA, UNSPECIFIED TYPE: ICD-10-CM

## 2022-08-09 RX ORDER — TRAZODONE HYDROCHLORIDE 50 MG/1
TABLET, FILM COATED ORAL
Qty: 270 TABLET | Refills: 3 | Status: SHIPPED | OUTPATIENT
Start: 2022-08-09 | End: 2023-09-21

## 2022-08-09 NOTE — TELEPHONE ENCOUNTER
"Routing refill request to provider for review/approval because:  Patient has new PCP      Requested Prescriptions   Pending Prescriptions Disp Refills     traZODone (DESYREL) 50 MG tablet [Pharmacy Med Name: TRAZODONE 50 MG TABLET] 270 tablet 1     Sig: TAKE 2-3 TABLETS AT BEDTIME       Serotonin Modulators Passed - 8/8/2022  1:30 PM        Passed - Recent (12 mo) or future (30 days) visit within the authorizing provider's specialty     Patient has had an office visit with the authorizing provider or a provider within the authorizing providers department within the previous 12 mos or has a future within next 30 days. See \"Patient Info\" tab in inbasket, or \"Choose Columns\" in Meds & Orders section of the refill encounter.              Passed - Medication is active on med list        Passed - Patient is age 18 or older           Colette Shannon, RN, BSN    "

## 2022-08-14 ENCOUNTER — MYC REFILL (OUTPATIENT)
Dept: FAMILY MEDICINE | Facility: CLINIC | Age: 72
End: 2022-08-14

## 2022-08-14 DIAGNOSIS — F34.1 DYSTHYMIA: ICD-10-CM

## 2022-08-15 RX ORDER — CITALOPRAM HYDROBROMIDE 40 MG/1
40 TABLET ORAL DAILY
Qty: 90 TABLET | Refills: 2 | Status: SHIPPED | OUTPATIENT
Start: 2022-08-15 | End: 2023-05-15

## 2022-08-26 DIAGNOSIS — M77.02 MEDIAL EPICONDYLITIS OF ELBOW, LEFT: ICD-10-CM

## 2022-08-29 RX ORDER — IBUPROFEN 800 MG/1
TABLET, FILM COATED ORAL
Qty: 90 TABLET | Refills: 3 | Status: SHIPPED | OUTPATIENT
Start: 2022-08-29

## 2022-08-29 NOTE — TELEPHONE ENCOUNTER
Routing refill request to provider for review/approval because:  Failed Protocol    Ashley Helm RN BSN  [facility]

## 2022-09-13 ENCOUNTER — MYC MEDICAL ADVICE (OUTPATIENT)
Dept: FAMILY MEDICINE | Facility: CLINIC | Age: 72
End: 2022-09-13

## 2022-09-16 ENCOUNTER — OFFICE VISIT (OUTPATIENT)
Dept: PODIATRY | Facility: CLINIC | Age: 72
End: 2022-09-16
Payer: COMMERCIAL

## 2022-09-16 VITALS
SYSTOLIC BLOOD PRESSURE: 104 MMHG | WEIGHT: 195 LBS | BODY MASS INDEX: 28.88 KG/M2 | HEIGHT: 69 IN | HEART RATE: 73 BPM | DIASTOLIC BLOOD PRESSURE: 69 MMHG

## 2022-09-16 DIAGNOSIS — L60.0 ONYCHOCRYPTOSIS: Primary | ICD-10-CM

## 2022-09-16 PROCEDURE — 11750 EXCISION NAIL&NAIL MATRIX: CPT | Mod: T5 | Performed by: PODIATRIST

## 2022-09-16 PROCEDURE — 99203 OFFICE O/P NEW LOW 30 MIN: CPT | Mod: 25 | Performed by: PODIATRIST

## 2022-09-16 NOTE — PATIENT INSTRUCTIONS
"PATIENT INSTRUCTIONS - Podiatry / Foot & Ankle Surgery    Aftercare instructions  -Remove the bandage tomorrow to begin soaking  -You may soak 2x daily in warm Epsom salts or warm soapy water for 5-10 minutes.  Alternatively, you may remove the band-aid and let clean shower water run over the toe.  -Cover w/ antibiotic ointment and a band-aid  -Redness and drainage (straw colored or bloody) are normal reactions to the chemical    -Continue soaking until the drainage stops  -Call the clinic if you experience:   -Redness extending beyond the 1st joint (\"knuckle\") of the toe   -Severe blistering   -Purulent (pus) drainage      "

## 2022-09-16 NOTE — LETTER
9/16/2022         RE: Ezio Bassett  [address] [facility] [address]        Dear Colleague,    Thank you for referring your patient, Ezio Bassett, to the [facility]. Please see a copy of my visit note below.      Assessment:      ICD-10-CM    1. Onychocryptosis  L60.0 REMOVAL NAIL/NAIL BED, PARTIAL OR COMPLETE          Plan:  Orders Placed This Encounter   Procedures     REMOVAL NAIL/NAIL BED, PARTIAL OR COMPLETE       Discussed the etiology and treatment of the condition with the patient.  Discussed treatment options.  Recommend partial removal of nail plate with chemical matrixectomy.  Procedure:  Nail avulsion  PARQ session was held with the patient, verbal consent obtained.  Local anesthesia obtained to the digit using lidocaine plain.  Skin was prepped.  Offending nail border, lateral border of hallux R, was removed in total.  Curettage of matrix performed.  Chemical matricectomy was performed using phenol.  Irrigation was carried out with saline.  Ointment and band-aid applied.    Patient tolerated the procedure well.    -Post-procedural instructions dispensed to patient.     Return:  No follow-ups on file.    Judy De La Vega DPM                Chief Complaint:     Patient presents with:  Right Great Toe - Ingrown Toenail     Has head partial removal in past, lateral border/ corner.    HPI:  Ezio Bassett is a 71 year old year old male who presents for evaluation of ingrown toenail.        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     TONSILLECTOMY        Family History   Problem Relation Age of Onset  "    Cancer Mother      Brain Cancer Mother         Social History:  ?  History   Smoking Status     Former Smoker     Packs/day: 0.00     Years: 40.00     Types: Cigarettes     Start date: 10/1/1966     Quit date: 5/10/2008   Smokeless Tobacco     Never Used     History   Drug Use No     Social History    Substance and Sexual Activity      Alcohol use: Not Currently        Comment: drinks daily approx 3 drinks of vodka, last drink at 2200 yesterday      Allergies:  ?   No Known Allergies     Medications:    Current Outpatient Medications   Medication     buPROPion (WELLBUTRIN XL) 150 MG 24 hr tablet     Cholecalciferol (VITAMIN D) 2000 UNIT CAPS     citalopram (CELEXA) 40 MG tablet     gabapentin (NEURONTIN) 100 MG capsule     ibuprofen (ADVIL/MOTRIN) 800 MG tablet     ipratropium (ATROVENT) 0.06 % nasal spray     ketoconazole (NIZORAL) 2 % external cream     Multiple Vitamins-Minerals (MULTIVITAMIN ADULT PO)     oxybutynin ER (DITROPAN XL) 10 MG 24 hr tablet     pravastatin (PRAVACHOL) 40 MG tablet     tamsulosin (FLOMAX) 0.4 MG capsule     traZODone (DESYREL) 50 MG tablet     triamcinolone (KENALOG) 0.1 % external cream     No current facility-administered medications for this visit.       Physical Exam:  ?  Vitals:  /69   Pulse 73   Ht 1.753 m (5' 9\")   Wt 88.5 kg (195 lb)   BMI 28.80 kg/m     General:  WD/WN, in NAD.  A&O x3.  Dermatologic:    Onychocryptosis present lateral border(s) of allux right.  Paronychia present.  Purulence absent.  Skin texture, turgor is normal.  Vascular:  Pulses palpable bilateral.  Digital capillary refill time normal bilateral.  Skin temperature is normal to affected digit(s).  Neurologic:    Gross sensation normal.  Gait and balance normal.  Musculoskeletal:  Maximal pain to palpation of affected nail border(s).  Gross deformity absent.              Again, thank you for allowing me to participate in the care of your patient.        Sincerely,        Judy De La Vega, " MELISSA

## 2022-09-16 NOTE — PROGRESS NOTES
Assessment:      ICD-10-CM    1. Onychocryptosis  L60.0 REMOVAL NAIL/NAIL BED, PARTIAL OR COMPLETE          Plan:  Orders Placed This Encounter   Procedures     REMOVAL NAIL/NAIL BED, PARTIAL OR COMPLETE       Discussed the etiology and treatment of the condition with the patient.  Discussed treatment options.  Recommend partial removal of nail plate with chemical matrixectomy.  Procedure:  Nail avulsion  PARQ session was held with the patient, verbal consent obtained.  Local anesthesia obtained to the digit using lidocaine plain.  Skin was prepped.  Offending nail border, lateral border of hallux R, was removed in total.  Curettage of matrix performed.  Chemical matricectomy was performed using phenol.  Irrigation was carried out with saline.  Ointment and band-aid applied.    Patient tolerated the procedure well.    -Post-procedural instructions dispensed to patient.     Return:  No follow-ups on file.    Judy De aL Vega DPM                Chief Complaint:     Patient presents with:  Right Great Toe - Ingrown Toenail     Has head partial removal in past, lateral border/ corner.    HPI:  Ezio Bassett is a 71 year old year old male who presents for evaluation of ingrown toenail.        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     TONSILLECTOMY        Family History   Problem Relation Age of Onset     Cancer Mother      Brain Cancer Mother         Social History:  ?  History   Smoking Status     Former Smoker     Packs/day: 0.00     Years: 40.00     Types: Cigarettes     Start date: 10/1/1966     Quit date: 5/10/2008   Smokeless Tobacco     Never Used  "    History   Drug Use No     Social History    Substance and Sexual Activity      Alcohol use: Not Currently        Comment: drinks daily approx 3 drinks of vodka, last drink at 2200 yesterday      Allergies:  ?   No Known Allergies     Medications:    Current Outpatient Medications   Medication     buPROPion (WELLBUTRIN XL) 150 MG 24 hr tablet     Cholecalciferol (VITAMIN D) 2000 UNIT CAPS     citalopram (CELEXA) 40 MG tablet     gabapentin (NEURONTIN) 100 MG capsule     ibuprofen (ADVIL/MOTRIN) 800 MG tablet     ipratropium (ATROVENT) 0.06 % nasal spray     ketoconazole (NIZORAL) 2 % external cream     Multiple Vitamins-Minerals (MULTIVITAMIN ADULT PO)     oxybutynin ER (DITROPAN XL) 10 MG 24 hr tablet     pravastatin (PRAVACHOL) 40 MG tablet     tamsulosin (FLOMAX) 0.4 MG capsule     traZODone (DESYREL) 50 MG tablet     triamcinolone (KENALOG) 0.1 % external cream     No current facility-administered medications for this visit.       Physical Exam:  ?  Vitals:  /69   Pulse 73   Ht 1.753 m (5' 9\")   Wt 88.5 kg (195 lb)   BMI 28.80 kg/m     General:  WD/WN, in NAD.  A&O x3.  Dermatologic:    Onychocryptosis present lateral border(s) of allux right.  Paronychia present.  Purulence absent.  Skin texture, turgor is normal.  Vascular:  Pulses palpable bilateral.  Digital capillary refill time normal bilateral.  Skin temperature is normal to affected digit(s).  Neurologic:    Gross sensation normal.  Gait and balance normal.  Musculoskeletal:  Maximal pain to palpation of affected nail border(s).  Gross deformity absent.          "

## 2022-09-21 DIAGNOSIS — I10 ESSENTIAL HYPERTENSION: ICD-10-CM

## 2022-09-22 ENCOUNTER — TELEPHONE (OUTPATIENT)
Dept: UROLOGY | Facility: CLINIC | Age: 72
End: 2022-09-22

## 2022-09-23 RX ORDER — PRAVASTATIN SODIUM 40 MG
TABLET ORAL
Qty: 90 TABLET | Refills: 2 | Status: SHIPPED | OUTPATIENT
Start: 2022-09-23 | End: 2023-06-19

## 2022-09-26 ENCOUNTER — VIRTUAL VISIT (OUTPATIENT)
Dept: FAMILY MEDICINE | Facility: CLINIC | Age: 72
End: 2022-09-26
Payer: COMMERCIAL

## 2022-09-26 DIAGNOSIS — Z87.891 PERSONAL HISTORY OF TOBACCO USE: ICD-10-CM

## 2022-09-26 DIAGNOSIS — N52.9 ERECTILE DYSFUNCTION, UNSPECIFIED ERECTILE DYSFUNCTION TYPE: Primary | ICD-10-CM

## 2022-09-26 PROCEDURE — 99213 OFFICE O/P EST LOW 20 MIN: CPT | Mod: 95 | Performed by: FAMILY MEDICINE

## 2022-09-26 RX ORDER — SILDENAFIL 100 MG/1
50-100 TABLET, FILM COATED ORAL DAILY PRN
Qty: 15 TABLET | Refills: 3 | Status: SHIPPED | OUTPATIENT
Start: 2022-09-26 | End: 2023-01-23

## 2022-09-26 NOTE — PATIENT INSTRUCTIONS
Lung Cancer Screening   Frequently Asked Questions  If you are at high-risk for lung cancer, getting screened with low-dose computed tomography (LDCT) every year can help save your life. This handout offers answers to some of the most common questions about lung cancer screening. If you have other questions, please call [phone] ([phone]).     What is it?  Lung cancer screening uses special X-ray technology to create an image of your lung tissue. The exam is quick and easy and takes less than 10 seconds. We don t give you any medicine or use any needles. You can eat before and after the exam. You don t need to change your clothes as long as the clothing on your chest doesn t contain metal. But, you do need to be able to hold your breath for at least 6 seconds during the exam.    What is the goal of lung cancer screening?  The goal of lung cancer screening is to save lives. Many times, lung cancer is not found until a person starts having physical symptoms. Lung cancer screening can help detect lung cancer in the earliest stages when it may be easier to treat.    Who should be screened for lung cancer?  We suggest lung cancer screening for anyone who is at high-risk for lung cancer. You are in the high-risk group if you:      are between the ages of 55 and 79, and    have smoked at least 1 pack of cigarettes a day for 20 or more years, and    still smoke or have quit within the past 15 years.    However, if you have a new cough or shortness of breath, you should talk to your doctor before being screened.    Why does it matter if I have symptoms?  Certain symptoms can be a sign that you have a condition in your lungs that should be checked and treated by your doctor. These symptoms include fever, chest pain, a new or changing cough, shortness of breath that you have never felt before, coughing up blood or unexplained weight loss. Having any of these symptoms can greatly affect the results of lung  cancer screening.       Should all smokers get an LDCT lung cancer screening exam?  It depends. Lung cancer screening is for a very specific group of men and women who have a history of heavy smoking over a long period of time (see  Who should be screened for lung cancer  above).  I am in the high-risk group, but have been diagnosed with cancer in the past. Is LDCT lung cancer screening right for me?  In some cases, you should not have LDCT lung screening, such as when your doctor is already following your cancer with CT scan studies. Your doctor will help you decide if LDCT lung screening is right for you.  Do I need to have a screening exam every year?  Yes. If you are in the high-risk group described earlier, you should get an LDCT lung cancer screening exam every year until you are 79, or are no longer willing or able to undergo screening and possible procedures to diagnose and treat lung cancer.  How effective is LDCT at preventing death from lung cancer?  Studies have shown that LDCT lung cancer screening can lower the risk of death from lung cancer by 20 percent in people who are at high-risk.  What are the risks?  There are some risks and limitations of LDCT lung cancer screening. We want to make sure you understand the risks and benefits, so please let us know if you have any questions. Your doctor may want to talk with you more about these risks.    Radiation exposure: As with any exam that uses radiation, there is a very small increased risk of cancer. The amount of radiation in LDCT is small--about the same amount a person would get from a mammogram. Your doctor orders the exam when he or she feels the potential benefits outweigh the risks.    False negatives: No test is perfect, including LDCT. It is possible that you may have a medical condition, including lung cancer, that is not found during your exam. This is called a false negative result.    False positives and more testing: LDCT very often finds  something in the lung that could be cancer, but in fact is not. This is called a false positive result. False positive tests often cause anxiety. To make sure these findings are not cancer, you may need to have more tests. These tests will be done only if you give us permission. Sometimes patients need a treatment that can have side effects, such as a biopsy. For more information on false positives, see  What can I expect from the results?     Findings not related to lung cancer: Your LDCT exam also takes pictures of areas of your body next to your lungs. In a very small number of cases, the CT scan will show an abnormal finding in one of these areas, such as your kidneys, adrenal glands, liver or thyroid. This finding may not be serious, but you may need more tests. Your doctor can help you decide what other tests you may need, if any.  What can I expect from the results?  About 1 out of 4 LDCT exams will find something that may need more tests. Most of the time, these findings are lung nodules. Lung nodules are very small collections of tissue in the lung. These nodules are very common, and the vast majority--more than 97 percent--are not cancer (benign). Most are normal lymph nodes or small areas of scarring from past infections.  But, if a small lung nodule is found to be cancer, the cancer can be cured more than 90 percent of the time. To know if the nodule is cancer, we may need to get more images before your next yearly screening exam. If the nodule has suspicious features (for example, it is large, has an odd shape or grows over time), we will refer you to a specialist for further testing.  Will my doctor also get the results?  Yes. Your doctor will get a copy of your results.  Is it okay to keep smoking now that there s a cancer screening exam?  No. Tobacco is one of the strongest cancer-causing agents. It causes not only lung cancer, but other cancers and cardiovascular (heart) diseases as well. The damage  caused by smoking builds over time. This means that the longer you smoke, the higher your risk of disease. While it is never too late to quit, the sooner you quit, the better.  Where can I find help to quit smoking?  The best way to prevent lung cancer is to stop smoking. If you have already quit smoking, congratulations and keep it up! For help on quitting smoking, please call [place] at 9-306-QUITNOW (1-755.641.6339) or the [employer] at 1-143.452.2957 to find local resources near you.  One-on-one health coaching:  If you d prefer to work individually with a health care provider on tobacco cessation, we offer:      Medication Therapy Management:  Our specially trained pharmacists work closely with you and your doctor to help you quit smoking.  Call [phone] or [phone] (toll free).

## 2022-09-26 NOTE — PROGRESS NOTES
"Abimael is a 71 year old who is being evaluated via a billable video visit.      How would you like to obtain your AVS? MyChart  If the video visit is dropped, the invitation should be resent by:   Will anyone else be joining your video visit? No        Assessment & Plan     Erectile dysfunction, unspecified erectile dysfunction type  Used in past.  Has coupon now to get inexpensively, would like to try again.  Discussed use, dosing, common/serious side effects.   - sildenafil (VIAGRA) 100 MG tablet; Take 0.5-1 tablets ( mg) by mouth daily as needed (erectile dysfunction.  ok to dispense generic)    Mood not worse after stopping buproprion but didn't help ed.     Personal history of tobacco use  Due for screen.  Meets criteria.  Doing CT every two years, quit 14 years ago, due for CT scan   - CT Chest Lung Cancer Scrn Low Dose wo; Future    Has both private and medicaire coverage.    Would like to do \"prevent physical\" now and then annual wellness next year so that doesn't have to wait one full  Year to do.     Cancel appt with dr. fragoso and schedule physical (not annual wellness)  with me as below.              BMI:   Estimated body mass index is 28.8 kg/m  as calculated from the following:    Height as of 9/16/22: 1.753 m (5' 9\").    Weight as of 9/16/22: 88.5 kg (195 lb).           Return in 3 weeks (on 10/17/2022) for 8:40 arrival, schedule as adult preventative not wellness.    Joel Daniel Wegener, MD  [facility]    Subjective   Abimael is a 71 year old, presenting for the following health issues:  No chief complaint on file.      History of Present Illness       Reason for visit:  1. Get Rx for Sildenafil citrate 100 mg.  2.  Schedule Physical/Wellness appt -- not available til end of December ?        Concern - multiple concerns- ED,stopped bupropion, should he get the new covid vaccine,   See hpi    Review of Systems         Objective           Vitals:  No vitals were obtained today due " to virtual visit.    Physical Exam   GENERAL: Healthy, alert and no distress  EYES: Eyes grossly normal to inspection.  No discharge or erythema, or obvious scleral/conjunctival abnormalities.  RESP: No audible wheeze, cough, or visible cyanosis.  No visible retractions or increased work of breathing.    SKIN: Visible skin clear. No significant rash, abnormal pigmentation or lesions.  NEURO: Cranial nerves grossly intact.  Mentation and speech appropriate for age.  PSYCH: Mentation appears normal, affect normal/bright, judgement and insight intact, normal speech and appearance well-groomed.                Video-Visit Details    Video Start Time: 9:06    Type of service:  Video Visit    Video End Time:9:20 AM    Originating Location (pt. Location): Home    Distant Location (provider location):  [facility]     Platform used for Video Visit: [employer]    Lung Cancer Screening Shared Decision Making Visit     Ezio Bassett, a 71 year old male, is eligible for lung cancer screening    History   Smoking Status     Former Smoker     Packs/day: 0.00     Years: 40.00     Types: Cigarettes     Start date: 10/1/1966     Quit date: 5/10/2008   Smokeless Tobacco     Never Used       I have discussed with patient the risks and benefits of screening for lung cancer with low-dose CT.     The risks include:    radiation exposure: one low dose chest CT has as much ionizing radiation as about 15 chest x-rays, or 6 months of background radiation living in [place]      false positives: most findings/nodules are NOT cancer, but some might still require additional diagnostic evaluation, including biopsy    over-diagnosis: some slow growing cancers that might never have been clinically significant will be detected and treated unnecessarily     The benefit of early detection of lung cancer is contingent upon adherence to annual screening or more frequent follow up if indicated.     Furthermore, to benefit from  screening, Ezio must be willing and able to undergo diagnostic procedures, if indicated. Although no specific guide is available for determining severity of comorbidities, it is reasonable to withhold screening in patients who have greater mortality risk from other diseases.     We did discuss that the best way to prevent lung cancer is to not smoke.    Some patients may value a numeric estimation of lung cancer risk when evaluating if lung cancer screening is right for them, here is one calculator:    ShouldIScreen

## 2022-09-27 NOTE — TELEPHONE ENCOUNTER
Attempted to call pt. Left detailed message to call clinic back at [phone].   To discuss procedure for in office cysto.     Maris Merrill RN MSN      
[facility] Call Center    Phone Message    May a detailed message be left on voicemail: no     Reason for Call: Pt is scheduled for a cysto on 11/15 pat/ Jennifer and is wanting to know what to expect for the procedure and what state he should expect to be in following the cysto. Please send further information to the pt's MyChart. Thank you    Action Taken: Message routed to:  [facility] Uro    Travel Screening: Not Applicable    
Patient returning call to Maris. Please reach out. Thank you  
Spoke to pt. Pt is wondering the reason to why the cystoscopy needs to be done and if resection of bladder would be completed at that time. Informed pt that the in office cysto is to have visual look into bladder to see if there is any need for surgery in the OR. Pt verbalized understanding.  No other questions noted.     Maris Merrill RN MSN    
Detail Level: Detailed
Number Of Freeze-Thaw Cycles: 2 freeze-thaw cycles
Post-Care Instructions: I reviewed with the patient in detail post-care instructions. Patient is to wear sunprotection, and avoid picking at any of the treated lesions. Pt may apply Vaseline to crusted or scabbing areas.
Render Post-Care Instructions In Note?: yes
Duration Of Freeze Thaw-Cycle (Seconds): 6
Consent: The patient's consent was obtained including but not limited to risks of crusting, scabbing, blistering, scarring, darker or lighter pigmentary change, recurrence, incomplete removal and infection.

## 2022-10-02 ENCOUNTER — MYC MEDICAL ADVICE (OUTPATIENT)
Dept: FAMILY MEDICINE | Facility: CLINIC | Age: 72
End: 2022-10-02

## 2022-10-17 ENCOUNTER — OFFICE VISIT (OUTPATIENT)
Dept: FAMILY MEDICINE | Facility: CLINIC | Age: 72
End: 2022-10-17
Payer: COMMERCIAL

## 2022-10-17 VITALS
RESPIRATION RATE: 16 BRPM | HEIGHT: 69 IN | BODY MASS INDEX: 28.44 KG/M2 | OXYGEN SATURATION: 98 % | DIASTOLIC BLOOD PRESSURE: 70 MMHG | HEART RATE: 60 BPM | TEMPERATURE: 98.4 F | WEIGHT: 192 LBS | SYSTOLIC BLOOD PRESSURE: 120 MMHG

## 2022-10-17 DIAGNOSIS — Z13.6 SCREENING FOR AAA (ABDOMINAL AORTIC ANEURYSM): ICD-10-CM

## 2022-10-17 DIAGNOSIS — D64.9 ANEMIA, UNSPECIFIED TYPE: ICD-10-CM

## 2022-10-17 DIAGNOSIS — Z00.00 ROUTINE GENERAL MEDICAL EXAMINATION AT A HEALTH CARE FACILITY: Primary | ICD-10-CM

## 2022-10-17 DIAGNOSIS — Z12.11 SPECIAL SCREENING FOR MALIGNANT NEOPLASMS, COLON: ICD-10-CM

## 2022-10-17 DIAGNOSIS — Z23 HIGH PRIORITY FOR 2019-NCOV VACCINE: ICD-10-CM

## 2022-10-17 DIAGNOSIS — Z87.891 HISTORY OF SMOKING: ICD-10-CM

## 2022-10-17 DIAGNOSIS — Z12.83 SKIN CANCER SCREENING: ICD-10-CM

## 2022-10-17 DIAGNOSIS — Z86.0100 HISTORY OF COLON POLYPS: ICD-10-CM

## 2022-10-17 LAB
ERYTHROCYTE [DISTWIDTH] IN BLOOD BY AUTOMATED COUNT: 12.3 % (ref 10–15)
HCT VFR BLD AUTO: 42 % (ref 40–53)
HGB BLD-MCNC: 14.1 G/DL (ref 13.3–17.7)
MCH RBC QN AUTO: 32.1 PG (ref 26.5–33)
MCHC RBC AUTO-ENTMCNC: 33.6 G/DL (ref 31.5–36.5)
MCV RBC AUTO: 96 FL (ref 78–100)
PLATELET # BLD AUTO: 210 10E3/UL (ref 150–450)
RBC # BLD AUTO: 4.39 10E6/UL (ref 4.4–5.9)
WBC # BLD AUTO: 7.5 10E3/UL (ref 4–11)

## 2022-10-17 PROCEDURE — 90662 IIV NO PRSV INCREASED AG IM: CPT | Performed by: FAMILY MEDICINE

## 2022-10-17 PROCEDURE — 99213 OFFICE O/P EST LOW 20 MIN: CPT | Mod: 25 | Performed by: FAMILY MEDICINE

## 2022-10-17 PROCEDURE — 0124A COVID-19,PF,PFIZER BOOSTER BIVALENT: CPT | Performed by: FAMILY MEDICINE

## 2022-10-17 PROCEDURE — 99397 PER PM REEVAL EST PAT 65+ YR: CPT | Performed by: FAMILY MEDICINE

## 2022-10-17 PROCEDURE — G0008 ADMIN INFLUENZA VIRUS VAC: HCPCS | Performed by: FAMILY MEDICINE

## 2022-10-17 PROCEDURE — 36415 COLL VENOUS BLD VENIPUNCTURE: CPT | Performed by: FAMILY MEDICINE

## 2022-10-17 PROCEDURE — 85027 COMPLETE CBC AUTOMATED: CPT | Performed by: FAMILY MEDICINE

## 2022-10-17 PROCEDURE — 91312 COVID-19,PF,PFIZER BOOSTER BIVALENT: CPT | Performed by: FAMILY MEDICINE

## 2022-10-17 ASSESSMENT — ENCOUNTER SYMPTOMS
DIZZINESS: 1
SHORTNESS OF BREATH: 1
EYE PAIN: 1

## 2022-10-17 ASSESSMENT — ACTIVITIES OF DAILY LIVING (ADL): CURRENT_FUNCTION: NO ASSISTANCE NEEDED

## 2022-10-17 ASSESSMENT — PATIENT HEALTH QUESTIONNAIRE - PHQ9
SUM OF ALL RESPONSES TO PHQ QUESTIONS 1-9: 7
10. IF YOU CHECKED OFF ANY PROBLEMS, HOW DIFFICULT HAVE THESE PROBLEMS MADE IT FOR YOU TO DO YOUR WORK, TAKE CARE OF THINGS AT HOME, OR GET ALONG WITH OTHER PEOPLE: SOMEWHAT DIFFICULT
SUM OF ALL RESPONSES TO PHQ QUESTIONS 1-9: 7

## 2022-10-17 ASSESSMENT — PAIN SCALES - GENERAL: PAINLEVEL: NO PAIN (0)

## 2022-10-17 NOTE — PATIENT INSTRUCTIONS
Has lung screen scheduled.     Call radiology to schedule abdominal aortic ultrasound screening.  RADIOLOGY:  [facility]:  [phone]   [facility]: [phone]    Colonoscopy due given h/o polyps order placed.     Dermatology referral for skin check/possible actinic keratoses    Covid/flu today.     Follow up one year, earlier as needed.     Noted to schedule as preventive physical not annual wellness per his preference since has dual coverage then can get annual wellness back on track to his preferred timing next year.

## 2022-10-17 NOTE — PROGRESS NOTES
"SUBJECTIVE:   Abimael is a 71 year old who presents for Preventive Visit.      Patient has been advised of split billing requirements and indicates understanding: Yes  Are you in the first 12 months of your Medicare coverage?  No    Healthy Habits:     In general, how would you rate your overall health?  Good    Frequency of exercise:  4-5 days/week    Duration of exercise:  45-60 minutes    Do you usually eat at least 4 servings of fruit and vegetables a day, include whole grains    & fiber and avoid regularly eating high fat or \"junk\" foods?  Yes    Taking medications regularly:  Yes    Medication side effects:  Not applicable    Ability to successfully perform activities of daily living:  No assistance needed    Home Safety:  No safety concerns identified    Hearing Impairment:  Difficulty following a conversation in a noisy restaurant or crowded room, need to ask people to speak up or repeat themselves and difficulty understanding soft or whispered speech    In the past 6 months, have you been bothered by leaking of urine?  No    In general, how would you rate your overall mental or emotional health?  Good      PHQ-2 Total Score: 1    Additional concerns today:  Yes    Do you feel safe in your environment? Yes    Have you ever done Advance Care Planning? (For example, a Health Directive, POLST, or a discussion with a medical provider or your loved ones about your wishes): Yes, advance care planning is on file.    Fall risk  Fallen 2 or more times in the past year?: No  Any fall with injury in the past year?: No    Cognitive Screening   1) Repeat 3 items (Leader, Season, Table)    2) Clock draw: NORMAL  3) 3 item recall: Recalls 3 objects  Results: 3 items recalled: COGNITIVE IMPAIRMENT LESS LIKELY    Mini-CogTM Copyright JACQUELINE Huntley. Licensed by the author for use in [facility]; reprinted with permission (sherley@.[email or web address]). All rights reserved.      Do you have sleep apnea, excessive snoring or daytime " drowsiness?: no    Reviewed and updated as needed this visit by clinical staff   Tobacco  Allergies  Meds   Med Hx  Surg Hx  Fam Hx  Soc Hx        Reviewed and updated as needed this visit by Provider                 Social History     Tobacco Use     Smoking status: Former     Packs/day: 1.00     Years: 40.00     Pack years: 40.00     Types: Cigarettes     Start date: 10/1/1966     Quit date: 5/10/2008     Years since quittin.4     Smokeless tobacco: Never   Substance Use Topics     Alcohol use: Not Currently     Comment: drinks daily approx 3 drinks of vodka, last drink at 2200 yesterday     If you drink alcohol do you typically have >3 drinks per day or >7 drinks per week? No    Alcohol Use 10/17/2022   Prescreen: >3 drinks/day or >7 drinks/week? Not Applicable   Prescreen: >3 drinks/day or >7 drinks/week? -   No flowsheet data found.        PROBLEMS TO ADD ON...  -------------------------------------  Overall feeling emotionally and physically well.   Has lung screen scheduled in a few weeks.   Confirms quit smoking about 14 years ago so likely last screen. Desires flu/covid vaccines.     Current providers sharing in care for this patient include:   Patient Care Team:  Wegener, Joel Daniel Irwin, MD as PCP - General (Family Medicine)  Rosetta Martinez MD as MD (Pulmonary Disease)  Jhon Vang MD as Assigned PCP  Goldie Bustamante CNP as Nurse Practitioner (Urology)  Goldie Bustamante CNP as Assigned Surgical Provider    The following health maintenance items are reviewed in Epic and correct as of today:  Health Maintenance   Topic Date Due     DEPRESSION ACTION PLAN  Never done     LUNG CANCER SCREENING  Never done     CBC  2020     INFLUENZA VACCINE (1) 2022     MEDICARE ANNUAL WELLNESS VISIT  2022     ZOSTER IMMUNIZATION (3 of 3) 2023 (Originally 2019)     PHQ-9  2023     CMP  2023     LIPID  2023     PSA   "06/06/2023     ANNUAL REVIEW OF HM ORDERS  06/06/2023     FALL RISK ASSESSMENT  10/17/2023     ADVANCE CARE PLANNING  10/17/2027     DTAP/TDAP/TD IMMUNIZATION (6 - Td or Tdap) 07/16/2028     COLORECTAL CANCER SCREENING  08/21/2029     HEPATITIS C SCREENING  Completed     Pneumococcal Vaccine: 65+ Years  Completed     HEPATITIS B IMMUNIZATION  Completed     AORTIC ANEURYSM SCREENING (SYSTEM ASSIGNED)  Completed     COVID-19 Vaccine  Completed     IPV IMMUNIZATION  Aged Out     MENINGITIS IMMUNIZATION  Aged Out               Review of Systems   HENT: Positive for hearing loss.    Eyes: Positive for pain.   Respiratory: Positive for shortness of breath.    Genitourinary: Positive for impotence. Negative for penile discharge.   Neurological: Positive for dizziness.     In past not bothering now.  Sildenafil for ED.     OBJECTIVE:   /70 (BP Location: Left arm, Patient Position: Sitting, Cuff Size: Adult Regular)   Pulse 60   Temp 98.4  F (36.9  C) (Oral)   Resp 16   Ht 1.753 m (5' 9\")   Wt 87.1 kg (192 lb)   SpO2 98%   BMI 28.35 kg/m   Estimated body mass index is 28.35 kg/m  as calculated from the following:    Height as of this encounter: 1.753 m (5' 9\").    Weight as of this encounter: 87.1 kg (192 lb).  Physical Exam  GENERAL: healthy, alert and no distress  EYES: Eyes grossly normal to inspection, PERRL and conjunctivae and sclerae normal    NECK: no adenopathy, no asymmetry, masses, or scars and thyroid normal to palpation  RESP: lungs clear to auscultation - no rales, rhonchi or wheezes  CV: regular rate and rhythm, normal S1 S2, no S3 or S4, no murmur, click or rub, no peripheral edema and peripheral pulses strong  ABDOMEN: soft, nontender, no hepatosplenomegaly, no masses and bowel sounds normal  MS: no gross musculoskeletal defects noted, no edema  NEURO: Normal strength and tone, mentation intact and speech normal  PSYCH: mentation appears normal, affect normal/bright    Skin with evidence of " radiation damage, keratoses (possible actinic) on scalp, back.         ASSESSMENT / PLAN:     Has lung screen scheduled.     Call radiology to schedule abdominal aortic ultrasound screening.  RADIOLOGY:  [facility]:  [phone]   [facility]: [phone]    Colonoscopy due given h/o polyps order placed.     Dermatology referral for skin check/possible actinic keratoses    Covid/flu today.     Follow up one year, earlier as needed.     Noted to schedule as preventive physical not annual wellness per his preference since has dual coverage then can get annual wellness back on track to his preferred timing next year.       ICD-10-CM    1. Routine general medical examination at a health care facility  Z00.00       2. High priority for 2019-nCoV vaccine  Z23       3. Anemia, unspecified type  D64.9 CBC with Platelets      4. Screening for AAA (abdominal aortic aneurysm)  Z13.6 Abdominal Aortic Aneurysm Screening/Tracking     US Abdominal Aorta Imaging      5. History of smoking  Z87.891 Abdominal Aortic Aneurysm Screening/Tracking     US Abdominal Aorta Imaging      6. Special screening for malignant neoplasms, colon  Z12.11 Colonoscopy Screening  Referral      7. History of colon polyps  Z86.010 Colonoscopy Screening  Referral      8. Skin cancer screening  Z12.83 Adult Dermatology Referral          Patient has been advised of split billing requirements and indicates understanding: Yes    COUNSELING:  Reviewed preventive health counseling, as reflected in patient instructions       Consider AAA screening for ages 65-75 and smoking history       Regular exercise       Healthy diet/nutrition       Aspirin prophylaxis        Consider lung cancer screening for ages 55-80 years (77 for Medicare) and 20 pack-year smoking history        Colon cancer screening       Prostate cancer screening    Estimated body mass index is 28.35 kg/m  as calculated from the following:    Height as of this  "encounter: 1.753 m (5' 9\").    Weight as of this encounter: 87.1 kg (192 lb).        He reports that he quit smoking about 14 years ago. His smoking use included cigarettes. He started smoking about 56 years ago. He has a 40.00 pack-year smoking history. He has never used smokeless tobacco.      Appropriate preventive services were discussed with this patient, including applicable screening as appropriate for cardiovascular disease, diabetes, osteopenia/osteoporosis, and glaucoma.  As appropriate for age/gender, discussed screening for colorectal cancer, prostate cancer, breast cancer, and cervical cancer. Checklist reviewing preventive services available has been given to the patient.    Reviewed patients plan of care and provided an AVS. The Basic Care Plan (routine screening as documented in Health Maintenance) for Ezio meets the Care Plan requirement. This Care Plan has been established and reviewed with the Patient.    Counseling Resources:  ATP IV Guidelines  Pooled Cohorts Equation Calculator  Breast Cancer Risk Calculator  Breast Cancer: Medication to Reduce Risk  FRAX Risk Assessment  ICSI Preventive Guidelines  Dietary Guidelines for Americans, 2010  [employer]'s MyPlate  ASA Prophylaxis  Lung CA Screening    Joel Daniel Wegener, MD  [facility]    Identified Health Risks:  Answers for HPI/ROS submitted by the patient on 10/17/2022  If you checked off any problems, how difficult have these problems made it for you to do your work, take care of things at home, or get along with other people?: Somewhat difficult  PHQ9 TOTAL SCORE: 7      "

## 2022-10-19 ENCOUNTER — ANCILLARY PROCEDURE (OUTPATIENT)
Dept: ULTRASOUND IMAGING | Facility: CLINIC | Age: 72
End: 2022-10-19
Attending: FAMILY MEDICINE
Payer: COMMERCIAL

## 2022-10-19 DIAGNOSIS — Z87.891 HISTORY OF SMOKING: ICD-10-CM

## 2022-10-19 DIAGNOSIS — Z13.6 SCREENING FOR AAA (ABDOMINAL AORTIC ANEURYSM): ICD-10-CM

## 2022-10-19 PROCEDURE — 76775 US EXAM ABDO BACK WALL LIM: CPT | Performed by: RADIOLOGY

## 2022-10-24 ENCOUNTER — PRE VISIT (OUTPATIENT)
Dept: UROLOGY | Facility: CLINIC | Age: 72
End: 2022-10-24

## 2022-10-24 NOTE — TELEPHONE ENCOUNTER
Reason for visit: Cystoscopy    Dx/Hx/Sx: BPH w/ LUTS    Records/images: In EPIC    At rooming: paper AUA, possible flow/pvr post-cysto; collect urine    Fernando Espinoza, EMT  October 24, 2022  10:36 AM

## 2022-11-15 ENCOUNTER — OFFICE VISIT (OUTPATIENT)
Dept: UROLOGY | Facility: CLINIC | Age: 72
End: 2022-11-15
Payer: COMMERCIAL

## 2022-11-15 VITALS
HEIGHT: 69 IN | BODY MASS INDEX: 27.4 KG/M2 | DIASTOLIC BLOOD PRESSURE: 70 MMHG | SYSTOLIC BLOOD PRESSURE: 108 MMHG | WEIGHT: 185 LBS | HEART RATE: 65 BPM

## 2022-11-15 DIAGNOSIS — R39.12 BENIGN PROSTATIC HYPERPLASIA WITH WEAK URINARY STREAM: Primary | ICD-10-CM

## 2022-11-15 DIAGNOSIS — N40.1 BENIGN PROSTATIC HYPERPLASIA WITH WEAK URINARY STREAM: Primary | ICD-10-CM

## 2022-11-15 PROCEDURE — 52000 CYSTOURETHROSCOPY: CPT | Performed by: UROLOGY

## 2022-11-15 PROCEDURE — 51798 US URINE CAPACITY MEASURE: CPT | Performed by: UROLOGY

## 2022-11-15 PROCEDURE — 51741 ELECTRO-UROFLOWMETRY FIRST: CPT | Performed by: UROLOGY

## 2022-11-15 RX ORDER — MIRABEGRON 50 MG/1
50 TABLET, EXTENDED RELEASE ORAL DAILY
Qty: 30 TABLET | Refills: 3 | Status: SHIPPED | OUTPATIENT
Start: 2022-11-15 | End: 2023-01-23

## 2022-11-15 RX ORDER — LIDOCAINE HYDROCHLORIDE 20 MG/ML
JELLY TOPICAL ONCE
Status: COMPLETED | OUTPATIENT
Start: 2022-11-15 | End: 2022-11-15

## 2022-11-15 RX ADMIN — LIDOCAINE HYDROCHLORIDE: 20 JELLY TOPICAL at 14:32

## 2022-11-15 ASSESSMENT — PAIN SCALES - GENERAL: PAINLEVEL: NO PAIN (0)

## 2022-11-15 NOTE — LETTER
11/15/2022       RE: Ezio Bassett  [address]  [place] [address]     Dear Colleague,    Thank you for referring your patient, Ezio Bassett, to the [facility] [place] [facility]. Please see a copy of my visit note below.    [facility] ER COMPREHENSIVE LOWER URINARY TRACT SYMPTOM EVALUATION    Reason for cystoscopy: Nocturia/LUTS refractory to medical management with alpha blocker and anticholinergic.  Main concern is his overactivity.    CYSTOSCOPY  After obtaining informed consent, the patient was prepped and draped in the standard sterile fashion.  The 15 Tamazight flexible cystoscope was inserted through the urethral meatus.      The anterior urethra was:  normal without stricture.    The external sphincter was  appropriately coapted.   The prostatic urethra demonstrated moderate bilobar hypertrophy, 4-5 cm.    The bladder neck was  nonocclusive.    The bladder was  unremarkable for tumors, erythema or stones.    The ureteral orifices  were identified on each side in orthotopic position with efflux of clear urine.   There were mild trabeculations.    On retroflexion there was the usual bladder neck hyperemia.    There was not intravesical protrusion of the prostate.      The patient tolerated the procedure well without complication.      [facility] uro prostate review 11/15/2022   Peak Flow 24.4   Average Flow 11.3   Residual Volume by Ultrasound 28   Character of Curve Bell Shape   AUA Score 20       SUMMARY: 73 yo M with hx of LUTS, OAB, nocturia refractory to medical management  -Symptoms suggestive more of OAB than LOPEZ  -Recommend 24 hour voiding diary and VUDS to better characterize etiology of symptoms        I, Mario Rodriguez saw and evaluated this patient and agree with the plan as stated above.  I personally performed all listed procedures.                                  Mario Rodriguez,  MD

## 2022-11-15 NOTE — PROGRESS NOTES
[facility] COMPREHENSIVE LOWER URINARY TRACT SYMPTOM EVALUATION    Reason for cystoscopy: Nocturia/LUTS refractory to medical management with alpha blocker and anticholinergic.  Main concern is his overactivity.    CYSTOSCOPY  After obtaining informed consent, the patient was prepped and draped in the standard sterile fashion.  The 15 [place]amanian flexible cystoscope was inserted through the urethral meatus.      The anterior urethra was:  normal without stricture.    The external sphincter was  appropriately coapted.   The prostatic urethra demonstrated moderate bilobar hypertrophy, 4-5 cm.    The bladder neck was  nonocclusive.    The bladder was  unremarkable for tumors, erythema or stones.    The ureteral orifices  were identified on each side in orthotopic position with efflux of clear urine.   There were mild trabeculations.    On retroflexion there was the usual bladder neck hyperemia.    There was not intravesical protrusion of the prostate.      The patient tolerated the procedure well without complication.      [facility] uro prostate review 11/15/2022   Peak Flow 24.4   Average Flow 11.3   Residual Volume by Ultrasound 28   Character of Curve Bell Shape   AUA Score 20       SUMMARY: 71 yo M with hx of LUTS, OAB, nocturia refractory to medical management  -Symptoms suggestive more of OAB than LOPEZ  -Recommend 24 hour voiding diary and VUDS to better characterize etiology of symptoms        IMario saw and evaluated this patient and agree with the plan as stated above.  I personally performed all listed procedures.

## 2022-11-15 NOTE — PATIENT INSTRUCTIONS
Please get the next available urodynamics study and then follow up with Dr. Jennifer rodriguez.     It was a pleasure meeting with you today.  Thank you for allowing me and my team the privilege of caring for you today.  YOU are the reason we are here, and I truly hope we provided you with the excellent service you deserve.  Please let us know if there is anything else we can do for you so that we can be sure you are leaving completely satisfied with your care experience.

## 2022-11-15 NOTE — NURSING NOTE
"Chief Complaint   Patient presents with     Cystoscopy       Blood pressure 108/70, pulse 65, height 1.753 m (5' 9\"), weight 83.9 kg (185 lb). Body mass index is 27.32 kg/m .    Patient Active Problem List   Diagnosis     HTN (hypertension)     Hyperlipidemia     Insomnia     LVH (left ventricular hypertrophy)     Alcohol dependence in early full remission (H)     Ex-smoker     Dysthymia       No Known Allergies    Current Outpatient Medications   Medication Sig Dispense Refill     Cholecalciferol (VITAMIN D) 2000 UNIT CAPS Take  by mouth.       citalopram (CELEXA) 40 MG tablet Take 1 tablet (40 mg) by mouth daily 90 tablet 2     gabapentin (NEURONTIN) 100 MG capsule TAKE 1 TO 2 TABLETS BY MOUTH AT BEDTIME 60 capsule 5     ibuprofen (ADVIL/MOTRIN) 800 MG tablet TAKE 1 TABLET BY MOUTH UP TO 3 TIMES A DAY AS NEEDED FOR PAIN 90 tablet 3     ipratropium (ATROVENT) 0.06 % nasal spray Spray 1 spray in nostril       ketoconazole (NIZORAL) 2 % external cream Apply topically daily (Patient taking differently: Apply topically as needed Daily as needed) 30 g 3     Multiple Vitamins-Minerals (MULTIVITAMIN ADULT PO) Take 1 tablet by mouth       oxybutynin ER (DITROPAN XL) 10 MG 24 hr tablet Take 1 tablet (10 mg) by mouth daily (Patient not taking: Reported on 10/17/2022) 30 tablet 3     pravastatin (PRAVACHOL) 40 MG tablet TAKE 1 TABLET BY MOUTH ONCE DAILY 90 tablet 2     sildenafil (VIAGRA) 100 MG tablet Take 0.5-1 tablets ( mg) by mouth daily as needed (erectile dysfunction.  ok to dispense generic) 15 tablet 3     traZODone (DESYREL) 50 MG tablet TAKE 2-3 TABLETS AT BEDTIME 270 tablet 3     triamcinolone (KENALOG) 0.1 % external cream Apply topically 2 times daily (Patient taking differently: Apply topically as needed When needed) 453 g 1       Social History     Tobacco Use     Smoking status: Former     Packs/day: 1.00     Years: 40.00     Pack years: 40.00     Types: Cigarettes     Start date: 10/1/1966     Quit " date: 5/10/2008     Years since quittin.5     Smokeless tobacco: Never   Vaping Use     Vaping Use: Never used   Substance Use Topics     Alcohol use: Not Currently     Comment: drinks daily approx 3 drinks of vodka, last drink at 2200 yesterday     Drug use: No       Invasive Procedure Safety Checklist:    Procedure: Cystoscopy    Action: Complete sections and checkboxes as appropriate.    Pre-procedure:  1. Patient ID Verified with 2 identifiers (Michelle and  or MRN) : YES    2. Procedure and site verified with patient/designee (when able) : YES    3. Accurate consent documentation in medical record : YES    4. H&P (or appropriate assessment) documented in medical record : N/A  H&P must be up to 30 days prior to procedure an updated within 24 hours of                 Procedure as applicable.     5. Relevant diagnostic and radiology test results appropriately labeled and displayed as applicable : YES    6. Blood products, implants, devices, and/or special equipment available for the procedure as applicable : YES    7. Procedure site(s) marked with provider initials [Exclusions: none] : NO    8. Marking not required. Reason : Yes  Procedure does not require site marking    Time Out:     Time-Out performed immediately prior to starting procedure, including verbal and active participation of all team members addressing: YES    1. Correct patient identity.  2. Confirmed that the correct side and site are marked.  3. An accurate procedure to be done.  4. Agreement on the procedure to be done.  5. Correct patient position.  6. Relevant images and results are properly labeled and appropriately displayed.  7. The need to administer antibiotics or fluids for irrigation purposes during the procedure as applicable.  8. Safety precautions based on patient history or medication use.    During Procedure: Verification of correct person, site, and procedure occurs any time the responsibility for care of the patient is  transferred to another member of the care team.    The following medication was given:     MEDICATION:  Lidocaine without epinephrine 2% jelly  ROUTE: urethral   SITE: urethral   DOSE: 10 mL  LOT #: [id number]  : [employer]  EXPIRATION DATE: 03/24  NDC#: [address]-2   Was there drug waste? No    Prior to med admin, verified patient identity using patient's name and date of birth.  Due to med administration, patient instructed to remain in clinic for 15 minutes  afterwards, and to report any adverse reaction to me immediately.    Drug Amount Wasted:  None.  Vial/Syringe: Syringe      Usman YOU  11/15/2022  2:31 PM

## 2022-12-19 ENCOUNTER — PRE VISIT (OUTPATIENT)
Dept: UROLOGY | Facility: CLINIC | Age: 72
End: 2022-12-19

## 2022-12-28 ENCOUNTER — ANCILLARY PROCEDURE (OUTPATIENT)
Dept: RADIOLOGY | Facility: AMBULATORY SURGERY CENTER | Age: 72
End: 2022-12-28
Attending: PHYSICIAN ASSISTANT
Payer: COMMERCIAL

## 2022-12-28 ENCOUNTER — ALLIED HEALTH/NURSE VISIT (OUTPATIENT)
Dept: UROLOGY | Facility: CLINIC | Age: 72
End: 2022-12-28
Payer: COMMERCIAL

## 2022-12-28 ENCOUNTER — TELEPHONE (OUTPATIENT)
Dept: UROLOGY | Facility: CLINIC | Age: 72
End: 2022-12-28

## 2022-12-28 VITALS
SYSTOLIC BLOOD PRESSURE: 127 MMHG | HEIGHT: 69 IN | BODY MASS INDEX: 27.4 KG/M2 | WEIGHT: 185 LBS | HEART RATE: 74 BPM | DIASTOLIC BLOOD PRESSURE: 80 MMHG

## 2022-12-28 DIAGNOSIS — R32 URINARY INCONTINENCE, UNSPECIFIED TYPE: ICD-10-CM

## 2022-12-28 DIAGNOSIS — R35.1 BENIGN PROSTATIC HYPERPLASIA WITH NOCTURIA: Primary | ICD-10-CM

## 2022-12-28 DIAGNOSIS — N40.1 BENIGN PROSTATIC HYPERPLASIA WITH NOCTURIA: Primary | ICD-10-CM

## 2022-12-28 LAB
ALBUMIN UR-MCNC: NEGATIVE MG/DL
APPEARANCE UR: CLEAR
BILIRUB UR QL STRIP: NEGATIVE
COLOR UR AUTO: YELLOW
GLUCOSE UR STRIP-MCNC: NEGATIVE MG/DL
HGB UR QL STRIP: NEGATIVE
KETONES UR STRIP-MCNC: NEGATIVE MG/DL
LEUKOCYTE ESTERASE UR QL STRIP: ABNORMAL
NITRATE UR QL: NEGATIVE
PH UR STRIP: 6.5 [PH] (ref 5–8)
SP GR UR STRIP: 1.01 (ref 1–1.03)
UROBILINOGEN UR STRIP-ACNC: 0.2 E.U./DL

## 2022-12-28 PROCEDURE — 51728 CYSTOMETROGRAM W/VP: CPT | Performed by: PHYSICIAN ASSISTANT

## 2022-12-28 PROCEDURE — 51600 INJECTION FOR BLADDER X-RAY: CPT | Performed by: PHYSICIAN ASSISTANT

## 2022-12-28 PROCEDURE — 81003 URINALYSIS AUTO W/O SCOPE: CPT | Performed by: PATHOLOGY

## 2022-12-28 PROCEDURE — 51784 ANAL/URINARY MUSCLE STUDY: CPT | Performed by: PHYSICIAN ASSISTANT

## 2022-12-28 PROCEDURE — 51797 INTRAABDOMINAL PRESSURE TEST: CPT | Performed by: PHYSICIAN ASSISTANT

## 2022-12-28 PROCEDURE — 81003 URINALYSIS AUTO W/O SCOPE: CPT | Performed by: PHYSICIAN ASSISTANT

## 2022-12-28 PROCEDURE — 51741 ELECTRO-UROFLOWMETRY FIRST: CPT | Performed by: PHYSICIAN ASSISTANT

## 2022-12-28 PROCEDURE — 74455 X-RAY URETHRA/BLADDER: CPT | Performed by: PHYSICIAN ASSISTANT

## 2022-12-28 RX ORDER — SULFAMETHOXAZOLE/TRIMETHOPRIM 800-160 MG
1 TABLET ORAL ONCE
Status: COMPLETED | OUTPATIENT
Start: 2022-12-28 | End: 2022-12-28

## 2022-12-28 RX ORDER — IOPAMIDOL 510 MG/ML
100 INJECTION, SOLUTION INTRAVASCULAR ONCE
Status: COMPLETED | OUTPATIENT
Start: 2022-12-28 | End: 2022-12-28

## 2022-12-28 RX ADMIN — IOPAMIDOL 100 ML: 510 INJECTION, SOLUTION INTRAVASCULAR at 13:36

## 2022-12-28 RX ADMIN — SULFAMETHOXAZOLE AND TRIMETHOPRIM 1 TABLET: 800; 160 TABLET ORAL at 13:36

## 2022-12-28 NOTE — NURSING NOTE
"  Chief Complaint   Patient presents with     Urodynamics Study       Blood pressure 127/80, pulse 74, height 1.753 m (5' 9\"), weight 83.9 kg (185 lb). Body mass index is 27.32 kg/m .    Patient Active Problem List   Diagnosis     HTN (hypertension)     Hyperlipidemia     Insomnia     LVH (left ventricular hypertrophy)     Alcohol dependence in early full remission (H)     Ex-smoker     Dysthymia       No Known Allergies    Current Outpatient Medications   Medication Sig Dispense Refill     Cholecalciferol (VITAMIN D) 2000 UNIT CAPS Take  by mouth.       citalopram (CELEXA) 40 MG tablet Take 1 tablet (40 mg) by mouth daily 90 tablet 2     gabapentin (NEURONTIN) 100 MG capsule TAKE 1 TO 2 TABLETS BY MOUTH AT BEDTIME 60 capsule 5     ibuprofen (ADVIL/MOTRIN) 800 MG tablet TAKE 1 TABLET BY MOUTH UP TO 3 TIMES A DAY AS NEEDED FOR PAIN 90 tablet 3     ipratropium (ATROVENT) 0.06 % nasal spray Spray 1 spray in nostril       ketoconazole (NIZORAL) 2 % external cream Apply topically daily (Patient taking differently: Apply topically as needed Daily as needed) 30 g 3     mirabegron (MYRBETRIQ) 50 MG 24 hr tablet Take 1 tablet (50 mg) by mouth daily 30 tablet 3     Multiple Vitamins-Minerals (MULTIVITAMIN ADULT PO) Take 1 tablet by mouth       pravastatin (PRAVACHOL) 40 MG tablet TAKE 1 TABLET BY MOUTH ONCE DAILY 90 tablet 2     traZODone (DESYREL) 50 MG tablet TAKE 2-3 TABLETS AT BEDTIME 270 tablet 3     triamcinolone (KENALOG) 0.1 % external cream Apply topically 2 times daily (Patient taking differently: Apply topically as needed When needed) 453 g 1     oxybutynin ER (DITROPAN XL) 10 MG 24 hr tablet Take 1 tablet (10 mg) by mouth daily (Patient not taking: Reported on 10/17/2022) 30 tablet 3     sildenafil (VIAGRA) 100 MG tablet Take 0.5-1 tablets ( mg) by mouth daily as needed (erectile dysfunction.  ok to dispense generic) 15 tablet 3       Social History     Tobacco Use     Smoking status: Former     Packs/day: " 1.00     Years: 40.00     Pack years: 40.00     Types: Cigarettes     Start date: 10/1/1966     Quit date: 5/10/2008     Years since quittin.6     Smokeless tobacco: Never   Vaping Use     Vaping Use: Never used   Substance Use Topics     Alcohol use: Not Currently     Comment: drinks daily approx 3 drinks of vodka, last drink at 2200 yesterday     Drug use: No       Invasive Procedure Safety Checklist:    Procedure: Urodynamics    Action: Complete sections and checkboxes as appropriate.  Pre-procedure:  1. Patient ID Verified with 2 identifiers (Michelle and  or MRN) : YES    2. Procedure and site verified with patient/designee (when able) : YES    3. Accurate consent documentation in medical record : YES    4. H&P (or appropriate assessment) documented in medical record : N/A  H&P must be up to 30 days prior to procedure an updated within 24 hours of Procedure as applicable.     5. Relevant diagnostic and radiology test results appropriately labeled and displayed as applicable : YES    6. Blood products, implants, devices, and/or special equipment available for the procedure as applicable : YES    7. Procedure site(s) marked with provider initials [Exclusions: none] : NO    8. Marking not required. Reason : Yes  Procedure does not require site marking    Time Out:     Time-Out performed immediately prior to starting procedure, including verbal and active participation of all team members addressing: YES    1. Correct patient identity.  2. Confirmed that the correct side and site are marked.  3. An accurate procedure to be done.  4. Agreement on the procedure to be done.  5. Correct patient position.  6. Relevant images and results are properly labeled and appropriately displayed.  7. The need to administer antibiotics or fluids for irrigation purposes during the procedure as applicable.  8. Safety precautions based on patient history or medication use.    During Procedure: Verification of correct person, site,  and procedure occurs any time the responsibility for care of the patient is transferred to another member of the care team.    The following medication was given: Sulfamethoxazole / Trimethoprim    MEDICATION:  Bactrim  ROUTE: PO  SITE: Orally  DOSE: 800/160mg  LOT #: [id number]  : [employer]  EXPIRATION DATE: 02/23  NDC#: 8295-8471-13   Was there drug waste? No    Prior to administration, verified patient identity using patient's name and date of birth.  Due to administration, patient instructed to remain in clinic for 15 minutes  afterwards, and to report any adverse reaction to me immediately.    Drug Amount Wasted:  None.  Vial/Syringe: Single dose vial    The following medication was given:     MEDICATION:  Iopamidol (Isovue-250)  ROUTE: Provider Administered  SITE: Provider Administered via catheter  DOSE: 100mL  LOT #: [id number]  : [employer]  EXPIRATION DATE: 12/24  NDC#: [address]-39   Was there drug waste? No    Prior to injection, verified patient identity using patient's name and date of birth.  Due to injection administration, patient instructed to remain in clinic for 15 minutes  afterwards, and to report any adverse reaction to me immediately.    Drug Amount Wasted:  None.  Vial/Syringe: Single dose vial    Ezio Bassett comes into clinic today at the request of Dr. Rodriguez for Urodynamics.    Order has been verified: Yes.    The following medication was given: See Above    Patient did tolerate procedure well.    Patient instructed as to where to call or go for pain, fever, leakage, or decreased urine flow.     This service provided today was under the direct supervision of Blanca Rhodes PA-C, who was available if needed.    Usman Adame, EMT  12/28/2022  1:46 PM

## 2022-12-28 NOTE — PATIENT INSTRUCTIONS
Please follow up with Dr. Rodriguez as scheduled.    It was a pleasure meeting with you today.  Thank you for allowing me and my team the privilege of caring for you today.  YOU are the reason we are here, and I truly hope we provided you with the excellent service you deserve.  Please let us know if there is anything else we can do for you so that we can be sure you are leaving completely satisfied with your care experience.

## 2022-12-28 NOTE — TELEPHONE ENCOUNTER
Spoke with patient. He will be coming in early for appointment around 1 pm.    Carl Adame EMT  12/28/22  12:22 PM

## 2022-12-28 NOTE — TELEPHONE ENCOUNTER
LVM stating that [facility] has a 1pm opening today if the patient is interested in being moved up. Gave instructions to call the clinic back if interested.    Carl Adame EMT

## 2022-12-29 NOTE — PROGRESS NOTES
Assessment:      ICD-10-CM    1. Onychocryptosis  L60.0              Plan:  No orders of the defined types were placed in this encounter.      Pt was scheduled in 30 min procedure slot   Pt does not want permanent removal    Pt was very unhappy he was scheduled when apt was not needed, inquires about being charged for appointment  Customer service information dispensed to him today      Return:  No follow-ups on file.    Judy De La Vega DPM                Chief Complaint:     Patient presents with:  Left Great Toe - Ingrown Toenail     left ingrown toenail    HPI:  Ezio Bassett is a 72 year old year old male who presents for evaluation of ingrown toenail.        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     TONSILLECTOMY        Family History   Problem Relation Age of Onset     Cancer Mother      Brain Cancer Mother      Other Cancer Mother         Social History:  ?  History   Smoking Status     Former     Packs/day: 1.00     Years: 40.00     Types: Cigarettes     Start date: 10/1/1966     Quit date: 5/10/2008   Smokeless Tobacco     Never     History   Drug Use No     Social History    Substance and Sexual Activity      Alcohol use: Not Currently        Comment: drinks daily approx 3 drinks of vodka, last drink at 2200 yesterday      Allergies:  ?   No Known Allergies     Medications:    Current Outpatient Medications   Medication     Cholecalciferol (VITAMIN D) 2000 UNIT CAPS     citalopram (CELEXA) 40 MG tablet     gabapentin (NEURONTIN) 100 MG capsule     ibuprofen (ADVIL/MOTRIN) 800 MG tablet     ipratropium (ATROVENT) 0.06 % nasal spray      ketoconazole (NIZORAL) 2 % external cream     mirabegron (MYRBETRIQ) 50 MG 24 hr tablet     Multiple Vitamins-Minerals (MULTIVITAMIN ADULT PO)     pravastatin (PRAVACHOL) 40 MG tablet     traZODone (DESYREL) 50 MG tablet     triamcinolone (KENALOG) 0.1 % external cream     sildenafil (VIAGRA) 100 MG tablet     No current facility-administered medications for this visit.       Physical Exam:  ?  Vitals:  /70 (BP Location: Right arm)   Pulse 67   Wt 83.9 kg (185 lb)   BMI 27.32 kg/m     General:  WD/WN, in NAD.  A&O x3.  Dermatologic:    Onychocryptosis Left  Paronychia present.  Purulence absent.  Skin texture, turgor is normal.  Vascular:  Digital capillary refill time normal bilateral.  Skin temperature is normal to affected digit(s).  Neurologic:    Gross sensation normal.  Gait and balance normal.  Musculoskeletal:  Maximal pain to palpation of affected nail border(s).  Gross deformity absent.

## 2022-12-30 ENCOUNTER — OFFICE VISIT (OUTPATIENT)
Dept: PODIATRY | Facility: CLINIC | Age: 72
End: 2022-12-30
Payer: COMMERCIAL

## 2022-12-30 VITALS
DIASTOLIC BLOOD PRESSURE: 70 MMHG | BODY MASS INDEX: 27.32 KG/M2 | HEART RATE: 67 BPM | WEIGHT: 185 LBS | SYSTOLIC BLOOD PRESSURE: 116 MMHG

## 2022-12-30 DIAGNOSIS — L60.0 ONYCHOCRYPTOSIS: Primary | ICD-10-CM

## 2022-12-30 PROCEDURE — 99207 PR NO CHARGE LOS: CPT | Performed by: PODIATRIST

## 2022-12-30 NOTE — PATIENT INSTRUCTIONS
PATIENT INSTRUCTIONS - Podiatry / Foot & Ankle Surgery    Return if permanent removal is desired

## 2023-01-01 NOTE — PROGRESS NOTES
Ezio MARIE Jaredgayle attended 3 hours of group on 10/7/2019.     The group topic was 8 Dimensions of Wellness, patient was responsive to topic.     Patients engagement in the group session: medium     Total number of patients present 8.     Supervising MD: Dr. Thad Strange, [facility]  10/7/2019, 12:26 PM  
Statement Selected

## 2023-01-05 ENCOUNTER — TRANSFERRED RECORDS (OUTPATIENT)
Dept: HEALTH INFORMATION MANAGEMENT | Facility: CLINIC | Age: 73
End: 2023-01-05
Payer: COMMERCIAL

## 2023-01-10 ENCOUNTER — PRE VISIT (OUTPATIENT)
Dept: UROLOGY | Facility: CLINIC | Age: 73
End: 2023-01-10

## 2023-01-10 NOTE — TELEPHONE ENCOUNTER
Reason for visit: Follow-Up; review UDS    Dx/Hx/Sx: BPH w/ nocturia    Records/imaging/labs/orders: In EPIC    At Rooming: standard video    AVELINO Rogers  January 10, 2023  3:13 PM

## 2023-01-11 ENCOUNTER — TELEPHONE (OUTPATIENT)
Dept: FAMILY MEDICINE | Facility: CLINIC | Age: 73
End: 2023-01-11
Payer: COMMERCIAL

## 2023-01-11 DIAGNOSIS — M54.9 CHRONIC BACK PAIN, UNSPECIFIED BACK LOCATION, UNSPECIFIED BACK PAIN LATERALITY: ICD-10-CM

## 2023-01-11 DIAGNOSIS — G89.29 CHRONIC BACK PAIN, UNSPECIFIED BACK LOCATION, UNSPECIFIED BACK PAIN LATERALITY: ICD-10-CM

## 2023-01-11 DIAGNOSIS — M54.2 NECK PAIN: Primary | ICD-10-CM

## 2023-01-11 NOTE — TELEPHONE ENCOUNTER
JW,      Asking for referral to [facility]: Chiropractor for back pain and left neck pain.    See message below from referral team.    Order T'd up    Thank you,  Agata Calderon RN

## 2023-01-11 NOTE — TELEPHONE ENCOUNTER
Sangeetha Mckeon Elizabeth, RN  Caller: Unspecified (Today,  9:17 AM)  Good morning,   It appears the patient has an open network for insurance, so he can see them, but he should check with his insurance to be sure they are in his network, and if Chiropractic is a covered service.   Thank you for reaching out,   Sangeetha   Referral coordinator

## 2023-01-11 NOTE — TELEPHONE ENCOUNTER
Triage,   Patient called back.   Doesn't need referral for chiro per insurance.   Ok to cancel referral request.   Thanks!  Alicia BHAKTA

## 2023-01-11 NOTE — TELEPHONE ENCOUNTER
Referral team,      Patient called.  Asking for referral to [facility]: Chiropractor for back pain and left neck pain.    Do we refer to [facility]?    Agata Calderon RN

## 2023-01-17 ENCOUNTER — TRANSFERRED RECORDS (OUTPATIENT)
Dept: HEALTH INFORMATION MANAGEMENT | Facility: CLINIC | Age: 73
End: 2023-01-17
Payer: COMMERCIAL

## 2023-01-17 NOTE — TELEPHONE ENCOUNTER
JW,  Patient calling back.  Insurance does not require referral, but clinic does.  Has appointment in ~20 minutes, hoping to get referral signed.  [phone] is the fax number for below.  Afua PLASENCIA RN

## 2023-01-17 NOTE — TELEPHONE ENCOUNTER
Faxed order to [facility].  Muir called back after receiving fax.  Also needs referral for physical therapy.  Pended order.  Afua PLASENCIA RN

## 2023-01-18 NOTE — TELEPHONE ENCOUNTER
Forms/Letter Request    Type of form/letter: pt eval spine soc 1/17/2023    Have you been seen for this request: Yes     Do we have the form/letter: Yes: order    When is form/letter needed by: asap    How would you like the form/letter returned: Fax [phone]    Patient Notified form requests are processed in 3-5 business days:Yes    [facility] or would you prefer to receive a phone call?:   No preference   Okay to leave a detailed message?: Yes at Other phone number:   [phone]

## 2023-01-18 NOTE — TELEPHONE ENCOUNTER
Faxed PT order to fax number listed below.   Called and informed patient referral was faxed.     Alicia BHAKTA

## 2023-01-19 NOTE — TELEPHONE ENCOUNTER
Forms faxed to [facility] fax # [phone] and copy sent to stat scan.     Alicia BHAKTA

## 2023-01-23 ENCOUNTER — ANCILLARY PROCEDURE (OUTPATIENT)
Dept: GENERAL RADIOLOGY | Facility: CLINIC | Age: 73
End: 2023-01-23
Attending: FAMILY MEDICINE
Payer: COMMERCIAL

## 2023-01-23 ENCOUNTER — OFFICE VISIT (OUTPATIENT)
Dept: FAMILY MEDICINE | Facility: CLINIC | Age: 73
End: 2023-01-23
Payer: COMMERCIAL

## 2023-01-23 VITALS
SYSTOLIC BLOOD PRESSURE: 117 MMHG | DIASTOLIC BLOOD PRESSURE: 72 MMHG | WEIGHT: 198.8 LBS | HEIGHT: 69 IN | TEMPERATURE: 98 F | BODY MASS INDEX: 29.44 KG/M2 | OXYGEN SATURATION: 95 % | HEART RATE: 72 BPM | RESPIRATION RATE: 16 BRPM

## 2023-01-23 DIAGNOSIS — M54.2 NECK PAIN: ICD-10-CM

## 2023-01-23 DIAGNOSIS — M62.838 TRAPEZIUS MUSCLE SPASM: Primary | ICD-10-CM

## 2023-01-23 DIAGNOSIS — M62.838 TRAPEZIUS MUSCLE SPASM: ICD-10-CM

## 2023-01-23 DIAGNOSIS — F10.21 ALCOHOL DEPENDENCE IN EARLY FULL REMISSION (H): ICD-10-CM

## 2023-01-23 PROCEDURE — 99214 OFFICE O/P EST MOD 30 MIN: CPT | Performed by: FAMILY MEDICINE

## 2023-01-23 PROCEDURE — 72040 X-RAY EXAM NECK SPINE 2-3 VW: CPT | Mod: TC | Performed by: RADIOLOGY

## 2023-01-23 RX ORDER — HYDROCODONE BITARTRATE AND ACETAMINOPHEN 5; 325 MG/1; MG/1
1 TABLET ORAL EVERY 6 HOURS PRN
Qty: 20 TABLET | Refills: 0 | Status: SHIPPED | OUTPATIENT
Start: 2023-01-23 | End: 2023-01-26

## 2023-01-23 ASSESSMENT — ANXIETY QUESTIONNAIRES
GAD7 TOTAL SCORE: 9
7. FEELING AFRAID AS IF SOMETHING AWFUL MIGHT HAPPEN: SEVERAL DAYS
8. IF YOU CHECKED OFF ANY PROBLEMS, HOW DIFFICULT HAVE THESE MADE IT FOR YOU TO DO YOUR WORK, TAKE CARE OF THINGS AT HOME, OR GET ALONG WITH OTHER PEOPLE?: SOMEWHAT DIFFICULT
1. FEELING NERVOUS, ANXIOUS, OR ON EDGE: MORE THAN HALF THE DAYS
6. BECOMING EASILY ANNOYED OR IRRITABLE: SEVERAL DAYS
2. NOT BEING ABLE TO STOP OR CONTROL WORRYING: SEVERAL DAYS
IF YOU CHECKED OFF ANY PROBLEMS ON THIS QUESTIONNAIRE, HOW DIFFICULT HAVE THESE PROBLEMS MADE IT FOR YOU TO DO YOUR WORK, TAKE CARE OF THINGS AT HOME, OR GET ALONG WITH OTHER PEOPLE: SOMEWHAT DIFFICULT
4. TROUBLE RELAXING: MORE THAN HALF THE DAYS
7. FEELING AFRAID AS IF SOMETHING AWFUL MIGHT HAPPEN: SEVERAL DAYS
5. BEING SO RESTLESS THAT IT IS HARD TO SIT STILL: SEVERAL DAYS
GAD7 TOTAL SCORE: 9
3. WORRYING TOO MUCH ABOUT DIFFERENT THINGS: SEVERAL DAYS
GAD7 TOTAL SCORE: 9

## 2023-01-23 ASSESSMENT — PAIN SCALES - GENERAL: PAINLEVEL: EXTREME PAIN (9)

## 2023-01-23 ASSESSMENT — PATIENT HEALTH QUESTIONNAIRE - PHQ9
SUM OF ALL RESPONSES TO PHQ QUESTIONS 1-9: 8
10. IF YOU CHECKED OFF ANY PROBLEMS, HOW DIFFICULT HAVE THESE PROBLEMS MADE IT FOR YOU TO DO YOUR WORK, TAKE CARE OF THINGS AT HOME, OR GET ALONG WITH OTHER PEOPLE: SOMEWHAT DIFFICULT
SUM OF ALL RESPONSES TO PHQ QUESTIONS 1-9: 8

## 2023-01-23 NOTE — PROGRESS NOTES
Assessment & Plan     Trapezius muscle spasm - with palpable trigger point  Xray, neck send results to [facility] chiropractic per his request. , [phone]    Continue physical therapy.    Consider trigger point injection if not improving.     Small amount Hydrocodone refilled.  No suspicious activity on [place] rx monitoring database     Taught non-weight bearing stretching strategy to do after warm packs.       - XR Cervical Spine 2/3 Views  - HYDROcodone-acetaminophen (NORCO) 5-325 MG tablet  Dispense: 20 tablet; Refill: 0    Neck pain    - XR Cervical Spine 2/3 Views  - HYDROcodone-acetaminophen (NORCO) 5-325 MG tablet  Dispense: 20 tablet; Refill: 0    Alcohol dependence in early full remission (H)  Continues in remission.         I spent a total of 35 minutes on the day of the visit.   Time spent doing chart review, history and exam, documentation and further activities per the note           No follow-ups on file.   Follow-up Visit   Expected date: Oct 23, 2023      Follow Up Appointment Details:     Follow-up with whom?: Me    Follow-Up for what?: Medicare Wellness    Welcome or Annual?: Annual Wellness    How?: In Person                    Joel Daniel Wegener, MD  [facility] is a 72 year old, presenting for the following health issues:  Musculoskeletal Problem      Musculoskeletal Problem    History of Present Illness       Reason for visit:  Severe back & neck pain.  Getting DMR Method (Diagnose, Manage and Rehabilitate) at [facility].  Need baseline x-ray?  Symptoms include:  Pain in left side of neck, can become migraine level and tension in upper back   Symptom intensity:  Severe  Symptom progression:  Improving  What makes it better:  PT helps, would like hydrocodone, heating pads help and jacuzzi bath and ibuprofen    He eats 2-3 servings of fruits and vegetables daily.He consumes 0 sweetened beverage(s) daily.He  "exercises with enough effort to increase his heart rate 30 to 60 minutes per day.  He exercises with enough effort to increase his heart rate 3 or less days per week.   He is taking medications regularly.    Today's PHQ-9         PHQ-9 Total Score: 8    PHQ-9 Q9 Thoughts of better off dead/self-harm past 2 weeks :   Not at all    How difficult have these problems made it for you to do your work, take care of things at home, or get along with other people: Somewhat difficult  Today's NATALY-7 Score: 9     Pain mainly left mid trapezius, can find a \"trigger point\" that if pressed on causes relief but also causes shooting pain down arm and up into neck.      No cervical spinal pain. No muscle weakness. Had old hydrocodone left which worked extremely well for pain and to help sleep desires small amount more.           Review of Systems         Objective    /72 (BP Location: Left arm, Patient Position: Sitting, Cuff Size: Adult Regular)   Pulse 72   Temp 98  F (36.7  C) (Temporal)   Resp 16   Ht 1.753 m (5' 9\")   Wt 90.2 kg (198 lb 12.8 oz)   SpO2 95%   BMI 29.36 kg/m    Body mass index is 29.36 kg/m .  Physical Exam   Palpable left trapezial/rhomboid trigger point identified which is epicenter of pain.                         "

## 2023-01-23 NOTE — PATIENT INSTRUCTIONS
Xray, neck send results to [facility], [phone]    Continue physical therapy.    Consider trigger point injection if not improving.     Hydrocodone refilled.

## 2023-01-26 DIAGNOSIS — M54.2 NECK PAIN: ICD-10-CM

## 2023-01-26 DIAGNOSIS — M62.838 TRAPEZIUS MUSCLE SPASM: ICD-10-CM

## 2023-01-26 RX ORDER — HYDROCODONE BITARTRATE AND ACETAMINOPHEN 5; 325 MG/1; MG/1
1 TABLET ORAL EVERY 6 HOURS PRN
Qty: 20 TABLET | Refills: 0 | Status: SHIPPED | OUTPATIENT
Start: 2023-01-26 | End: 2023-02-09

## 2023-01-26 NOTE — TELEPHONE ENCOUNTER
Routing refill request to provider for review/approval because:  Drug not on the [facility] refill protocol   RN confirmed that patient only picked up 7 pills the last time he got his prescription.  Did not ask about fax number?  Afua PLASENCIA RN

## 2023-01-26 NOTE — TELEPHONE ENCOUNTER
Triage,   Ron called.   States he only picked up 7 tablets a couple of days ago because [facility] pharmacy only had 7 tablets in stock.   Needs us to send a new Rx of hydrocodone to fill the remaining tablets.     Of note, [facility] does not have our correct fax number listed.   Patient is upset [facility] cannot fax us refill requests.     Please advise.   Thanks!  Alicia BHAKTA

## 2023-02-03 ENCOUNTER — TELEPHONE (OUTPATIENT)
Dept: FAMILY MEDICINE | Facility: CLINIC | Age: 73
End: 2023-02-03

## 2023-02-03 NOTE — TELEPHONE ENCOUNTER
Left message for patient to call [facility] back  When patient calls back please transfer to an RN  Afua PLASENCIA RN

## 2023-02-03 NOTE — TELEPHONE ENCOUNTER
Pt called back. He was already at the emergency room being seen. Thanks    Gissel Seay RN  [facility]

## 2023-02-03 NOTE — TELEPHONE ENCOUNTER
Provider Response to 2nd Level Triage Request    I have reviewed the RN documentation. My recommendation is:  Refer to [facility] ([facility]) clinic as first order acute (Nurse may call [facility] to notify kevin pt diagnosis/treatment rational below.     Otherwise urgent care or EMERGENCY ROOM.     Joel Wegener,MD     Referral to Acute and Diagnostic Services          Patient qualifies for First Order Acute services at the [facility] clinic.    Patient diagnoses/treatments needed:  dyspnea, no known history of chf, asthma/copd likely need cxr, antibiotics, steroid, possibly nebulizer  [facility] Referral placed: Yes    Nursing staff to contact patient and confirm willingness to receive next level of care at the [facility] site in [place] ([phone], internal use only) or [place] ([phone], internal use only). Confirm the following are true:      Patient has transportation to travel to [facility] without delay    Patient understands that evaluation/treatment at [facility] typically takes significantly longer than in clinic/urgent care (>2 hours)    If patient is in agreement, contact the [facility] to confirm patient acceptance and provide necessary information to [facility] provider.       For patients going to the [facility], instruct patients to enter the east entrance of the building ([address]) and go to Check In C    For patients going to the [facility], have them enter building ([address] attached to [facility]) and go to the 2nd floor, [address]                Joel Wegener,MD w   Line Functional

## 2023-02-03 NOTE — TELEPHONE ENCOUNTER
JW - FYI      Patient called.  States past 2 days has been having problems breathing, hard to take a deep breath at times and wants to be seen    Talking slowly, does should SOB. Not gasping for air     was walking to his dentist yesterday who is 4 blocks away from his home and could only walk 1 block due to feeling like he couldn't breath due to the cold weather.    Still having breathing issues, still can take a deep breath.    Patient does not want to call 911.  States he will have someone drive him to the ED.    Thanks,  Agata Calderon, SAUL

## 2023-02-09 ENCOUNTER — VIRTUAL VISIT (OUTPATIENT)
Dept: FAMILY MEDICINE | Facility: CLINIC | Age: 73
End: 2023-02-09
Payer: COMMERCIAL

## 2023-02-09 DIAGNOSIS — F34.1 DYSTHYMIA: ICD-10-CM

## 2023-02-09 DIAGNOSIS — I30.9 ACUTE PERICARDITIS, UNSPECIFIED TYPE: ICD-10-CM

## 2023-02-09 DIAGNOSIS — I31.39 PERICARDIAL EFFUSION: ICD-10-CM

## 2023-02-09 DIAGNOSIS — M54.9 CHRONIC BACK PAIN, UNSPECIFIED BACK LOCATION, UNSPECIFIED BACK PAIN LATERALITY: ICD-10-CM

## 2023-02-09 DIAGNOSIS — Z09 HOSPITAL DISCHARGE FOLLOW-UP: Primary | ICD-10-CM

## 2023-02-09 DIAGNOSIS — F10.21 ALCOHOL DEPENDENCE IN EARLY FULL REMISSION (H): ICD-10-CM

## 2023-02-09 DIAGNOSIS — E78.5 HYPERLIPIDEMIA, UNSPECIFIED HYPERLIPIDEMIA TYPE: ICD-10-CM

## 2023-02-09 DIAGNOSIS — K04.7 DENTAL INFECTION: ICD-10-CM

## 2023-02-09 DIAGNOSIS — G89.29 CHRONIC BACK PAIN, UNSPECIFIED BACK LOCATION, UNSPECIFIED BACK PAIN LATERALITY: ICD-10-CM

## 2023-02-09 PROCEDURE — 99215 OFFICE O/P EST HI 40 MIN: CPT | Mod: VID | Performed by: FAMILY MEDICINE

## 2023-02-09 RX ORDER — COLCHICINE 0.6 MG/1
0.6 TABLET ORAL
COMMUNITY
Start: 2023-02-06 | End: 2023-05-02

## 2023-02-09 RX ORDER — PANTOPRAZOLE SODIUM 20 MG/1
20 TABLET, DELAYED RELEASE ORAL
COMMUNITY
Start: 2023-02-07 | End: 2023-08-29

## 2023-02-09 NOTE — PROGRESS NOTES
Abimael is a 72 year old who is being evaluated via a billable video visit.      How would you like to obtain your AVS? MyChart  If the video visit is dropped, the invitation should be resent by: Text to cell phone: [phone]  Will anyone else be joining your video visit? No          Assessment & Plan     Hospital discharge follow-up  He appears to be doing better since being discharged from the hospital.  I recommended he get in to have the labs below checked as recommended by the discharging physician.    Acute pericarditis, unspecified type  Continue the NSAIDs, colchicine and pantoprazole as prescribed.  I recommended he have the following labs checked as well and he will be following up with cardiology and getting a repeat echocardiogram in a few weeks.  - CRP, inflammation; Future  - ESR: Erythrocyte sedimentation rate; Future  - Hemoglobin; Future  - Basic metabolic panel  (Ca, Cl, CO2, Creat, Gluc, K, Na, BUN); Future    Pericardial effusion  He appears to be doing better from a symptom standpoint.  He will continue the NSAIDs, colchicine and pantoprazole as prescribed.  I recommended he have the following labs checked as well and he will be following up with cardiology and getting a repeat echocardiogram in a few weeks.    Dental infection  He recently had a tooth extracted 2 days ago on 2/7/2023.  He will continue to follow closely with his dentist as he continues to struggle with some dental pain symptoms.  I refilled the Tylenol with codeine that he may use as needed for breakthrough pain.  - acetaminophen-codeine (TYLENOL #3) 300-30 MG tablet; Take 1 tablet by mouth every 6 hours as needed for severe pain (7-10)    Alcohol dependence in early full remission (H)  This issue is stable and in remission.    Dysthymia  Stable on citalopram and trazodone.    Chronic back pain, unspecified back location, unspecified back pain laterality  He is on NSAIDs.    Hyperlipidemia, unspecified hyperlipidemia  type  Continue pravastatin.        40 minutes spent on the date of the encounter doing chart review, review of outside records, review of test results, interpretation of tests, patient visit and documentation      MED [facility] REQUIRED  Post Medication Reconciliation Status:  Discharge medications reconciled, continue medications without change    MEDICATIONS:  Continue current medications without change    Return in about 2 weeks (around 2/23/2023) for Follow up if symptoms not improving..    Charli Ruiz DO  [facility]REMI Varghese is a 72 year old, presenting for the following health issues:  No chief complaint on file.      [facility]       Hospital Follow-up Visit:    Hospital/Nursing Home/IP Rehab Facility: [facility]  Date of Admission: 2/3/23  Date of Discharge: 2/6/23  Reason(s) for Admission: Chest pain, unspecified type    Dyspnea on exertion    Pericardial effusion    Pleural effusion    Mixed hyperlipidemia    HTN (hypertension)      Was your hospitalization related to COVID-19? No   Problems taking medications regularly:  None  Medication changes since discharge: None  Problems adhering to non-medication therapy:  None    Summary of hospitalization:  CareEverywhere information obtained and reviewed  Diagnostic Tests/Treatments reviewed.  Follow up needed: They recommended a follow-up with CV at [facility] in 2-4 weeks & have repeat echo in 2-3 weeks  Other Healthcare Providers Involved in Patient s Care:         Cardiology  Update since discharge: improved.       He was recently hospitalized at [facility] for pericarditis secondary to pericardial effusion.  He initially presented with chest pains and shortness of breath symptoms.  He reports that they were planning to do a pericardial paracentesis, but the effusion appeared smaller and they decided it was not worth the risk.  He was admitted for monitoring and management.  He was started on colchicine and ibuprofen.   Serial echocardiograms did not show worsening of the pericardial effusion.  They recommended the following:    - Repeat CRP, sed rate, BMP, hgb in 3-7 days s/p discharge  - F/U with CV at [facility] in 2-4 weeks & have repeat echo in 2-3 weeks  - Continue ibuprofen 600 mg TID x 2 weeks, then 400 mg TID x 2 weeks, then 200 mg TID x 1 week  - Colchicine 0.6 mg BID x 3 months  - PPI (pantoprazole) while on NSAIDS    He has a medical history of dysthymia, hyperlipidemia, chronic back pain and history of alcohol dependence in remission.  He also has been struggling with a dental infection and recently had a tooth extracted on 2/7/2023.  He reports running low on his pain medication and needs the Tylenol with codeine refilled.  He is going to keep following closely with his dentist.    Plan of care communicated with patient                 Review of Systems   Constitutional, HEENT, cardiovascular, pulmonary, GI, , musculoskeletal, neuro, skin, endocrine and psych systems are negative, except as otherwise noted.      Objective           Vitals:  No vitals were obtained today due to virtual visit.    Physical Exam   GENERAL: Healthy, alert and no distress  EYES: Eyes grossly normal to inspection.  No discharge or erythema, or obvious scleral/conjunctival abnormalities.  RESP: No audible wheeze, cough, or visible cyanosis.  No visible retractions or increased work of breathing.    SKIN: Visible skin clear. No significant rash, abnormal pigmentation or lesions.  NEURO: Cranial nerves grossly intact.  Mentation and speech appropriate for age.  PSYCH: Mentation appears normal, affect normal/bright, judgement and insight intact, normal speech and appearance well-groomed.                Video-Visit Details    Type of service:  Video Visit   Video Start Time: 12:00 PM  Video End Time:12:29 PM    Originating Location (pt. Location): Home    Distant Location (provider location):  On-site  Platform used for Video Visit: [employer]

## 2023-02-10 ENCOUNTER — TELEPHONE (OUTPATIENT)
Dept: FAMILY MEDICINE | Facility: CLINIC | Age: 73
End: 2023-02-10
Payer: COMMERCIAL

## 2023-02-10 ENCOUNTER — OFFICE VISIT (OUTPATIENT)
Dept: URGENT CARE | Facility: URGENT CARE | Age: 73
End: 2023-02-10
Payer: COMMERCIAL

## 2023-02-10 VITALS
WEIGHT: 198 LBS | DIASTOLIC BLOOD PRESSURE: 67 MMHG | BODY MASS INDEX: 29.24 KG/M2 | HEART RATE: 70 BPM | RESPIRATION RATE: 16 BRPM | OXYGEN SATURATION: 95 % | SYSTOLIC BLOOD PRESSURE: 113 MMHG | TEMPERATURE: 97.3 F

## 2023-02-10 DIAGNOSIS — L50.0 DRUG-INDUCED URTICARIA: ICD-10-CM

## 2023-02-10 DIAGNOSIS — T50.905A DRUG-INDUCED URTICARIA: ICD-10-CM

## 2023-02-10 DIAGNOSIS — L50.9 HIVES: Primary | ICD-10-CM

## 2023-02-10 PROCEDURE — 99213 OFFICE O/P EST LOW 20 MIN: CPT | Performed by: PHYSICIAN ASSISTANT

## 2023-02-10 RX ORDER — TRIAMCINOLONE ACETONIDE 5 MG/G
OINTMENT TOPICAL
Qty: 60 G | Refills: 0 | Status: SHIPPED | OUTPATIENT
Start: 2023-02-10 | End: 2023-10-23

## 2023-02-10 NOTE — TELEPHONE ENCOUNTER
Spoke with patient.  States he will check with his Dermatologist or go to .    Agata Calderon RN

## 2023-02-10 NOTE — TELEPHONE ENCOUNTER
DE,      Patient called.  Did VV with you yesterday.    Woke up this am with rash both sides of his waist.  States has raised bumps, look like water blisters.  Denies pain.  States very itchy.    Do you want to do a VV with him for rash?    Agata Calderon, RN

## 2023-02-10 NOTE — TELEPHONE ENCOUNTER
He should have an appointment to have this issue looked at, ideally today.  Unfortunately, I am not able to see him today as my schedule is booked.  Thank you, DE

## 2023-02-10 NOTE — PATIENT INSTRUCTIONS
Penicillin was likely the cause of the hives    I have added this as an allergy to your chart    Start oral antihistamine zyrtec Claritin or allegra     Benedryl is another option but will cause sedation this is every 6 hours     Discuss with Wegener, Joel Daniel Irwin

## 2023-02-10 NOTE — PROGRESS NOTES
SUBJECTIVE:   Ezio Bassett is a 72 year old male who complains of rash both sides of chest for 2 days. He was started on chol chine and PPI and Advil for inflammation around his heart which he was recently in hospital for.  They also had him on penicillin for 10 days which ended 2 days ago. He denies a history of shortness of breath, weakness, fatigue, dizzyness, nausea and vomiting.  The rash is very itchy and he has not tried anything for this     OBJECTIVE:    /67   Pulse 70   Temp 97.3  F (36.3  C) (Tympanic)   Resp 16   Wt 89.8 kg (198 lb)   SpO2 95%   BMI 29.24 kg/m      He appears well, vital signs are as noted by the nurse. Ears normal.  Throat and pharynx normal.  Neck supple. No adenopathy in the neck.. The chest is clear, without wheezes or rales. He has multiple wheels with excoriation on both lateral aspects of chest.  No pustules.     ASSESSMENT:   (L50.9) Hives  (primary encounter diagnosis)  Comment:   Plan: triamcinolone (KENALOG) 0.5 % external ointment            (L50.0,  T50.905A) Drug-induced urticaria  Comment:   Plan:         PLAN:  Penicillin was likely the cause of the hives    I have added this as an allergy to your chart    Start oral antihistamine zyrtec Claritin or allegra     Benedryl is another option but will cause sedation this is every 6 hours     Discuss with Wegener, Joel Daniel Irwin     Symptomatic therapy suggested: . Call or return to clinic prn if these symptoms worsen or fail to improve as anticipated.

## 2023-02-13 ENCOUNTER — LAB (OUTPATIENT)
Dept: LAB | Facility: CLINIC | Age: 73
End: 2023-02-13
Payer: COMMERCIAL

## 2023-02-13 DIAGNOSIS — I30.9 ACUTE PERICARDITIS, UNSPECIFIED TYPE: ICD-10-CM

## 2023-02-13 LAB
ANION GAP SERPL CALCULATED.3IONS-SCNC: 13 MMOL/L (ref 7–15)
BUN SERPL-MCNC: 20.6 MG/DL (ref 8–23)
CALCIUM SERPL-MCNC: 9.4 MG/DL (ref 8.8–10.2)
CHLORIDE SERPL-SCNC: 106 MMOL/L (ref 98–107)
CREAT SERPL-MCNC: 1.2 MG/DL (ref 0.67–1.17)
CRP SERPL-MCNC: 12.8 MG/L
DEPRECATED HCO3 PLAS-SCNC: 22 MMOL/L (ref 22–29)
ERYTHROCYTE [SEDIMENTATION RATE] IN BLOOD BY WESTERGREN METHOD: 40 MM/HR (ref 0–20)
GFR SERPL CREATININE-BSD FRML MDRD: 64 ML/MIN/1.73M2
GLUCOSE SERPL-MCNC: 79 MG/DL (ref 70–99)
HGB BLD-MCNC: 12.6 G/DL (ref 13.3–17.7)
POTASSIUM SERPL-SCNC: 5.3 MMOL/L (ref 3.4–5.3)
SODIUM SERPL-SCNC: 141 MMOL/L (ref 136–145)

## 2023-02-13 PROCEDURE — 85652 RBC SED RATE AUTOMATED: CPT

## 2023-02-13 PROCEDURE — 85018 HEMOGLOBIN: CPT

## 2023-02-13 PROCEDURE — 86140 C-REACTIVE PROTEIN: CPT

## 2023-02-13 PROCEDURE — 36415 COLL VENOUS BLD VENIPUNCTURE: CPT

## 2023-02-13 PROCEDURE — 80048 BASIC METABOLIC PNL TOTAL CA: CPT

## 2023-02-14 ENCOUNTER — VIRTUAL VISIT (OUTPATIENT)
Dept: UROLOGY | Facility: CLINIC | Age: 73
End: 2023-02-14
Payer: COMMERCIAL

## 2023-02-14 DIAGNOSIS — R39.198 SLOWING OF URINARY STREAM: Primary | ICD-10-CM

## 2023-02-14 PROCEDURE — 99213 OFFICE O/P EST LOW 20 MIN: CPT | Mod: VID | Performed by: UROLOGY

## 2023-02-14 NOTE — NURSING NOTE
Is the patient currently in the state of [place]? YES    Visit mode:VIDEO    If the visit is dropped, the patient can be reconnected by: VIDEO VISIT: Text to cell phone: [phone]    Will anyone else be joining the visit? NO      How would you like to obtain your AVS? MyChart    Are changes needed to the allergy or medication list? NO    Comments or concerns regarding today's visit: no

## 2023-02-14 NOTE — PROGRESS NOTES
UROLOGY OUTPATIENT VISIT      Chief Complaint:   LUTS      Synopsis    Ezio Bassett is a very pleasant AGE: 72 year old year old person    He has a history of nocturia/LUTS refractory to alpha blockers and anticholinergics  He had cysto with me 11/22 showing mild BPH  Now returns post VUDS:    POSTPROCEDURE DIAGNOSES:  -Maximum cystometric capacity 460 mL with normal to slightly heightened filling sensations.  -Good bladder compliance without uninhibited detrusor contractions, but possible mild terminal detrusor instability at capacity without incontinence.   -Maximum detrusor contraction during voiding reaches 84 cm H2O, which he augments with some abdominal straining. He voids 462 mL with Qmax 12 ml/s, slightly prolonged flow curve, quiet EMG activity, and complete bladder emptying (PVR 0 mL). BOOI is 37.5 which is equivocal for bladder outlet obstruction. Note that pre-study uroflowmetry demonstrates a Qmax of 26.3 ml/s with bell-shape flow curve and PVR 75 mL, which is not suggestive for significant bladder outlet obstruction.  -Fluoroscopy reveals a mildly trabeculated bladder wall without diverticuli or VUR. The bladder neck is closed during filling and open during voiding.  -Voiding diary demonstrates daily fluid intake between  oz per day which includes 23-30 oz of coffee in the morning, 17-34 oz of Diet Dr Pepper in the PM (often right before bed), the rest water or milk. He voids every 1-3 hours during the day and 2-3 times per night. Total urine output in 24 hours varies from 8179-9893 mL per day. Average voided volume is 340 mL, largest voided volume is 785 mL.     Remains symptomatic, frustrated with lack of relief to this point         Medications     Current Outpatient Medications   Medication     acetaminophen-codeine (TYLENOL #3) 300-30 MG tablet     citalopram (CELEXA) 40 MG tablet     colchicine (COLCYRS) 0.6 MG tablet     gabapentin (NEURONTIN) 100 MG capsule     ibuprofen  (ADVIL/MOTRIN) 800 MG tablet     ipratropium (ATROVENT) 0.06 % nasal spray     Multiple Vitamins-Minerals (MULTIVITAMIN ADULT PO)     pantoprazole (PROTONIX) 20 MG EC tablet     pravastatin (PRAVACHOL) 40 MG tablet     traZODone (DESYREL) 50 MG tablet     triamcinolone (KENALOG) 0.5 % external ointment     No current facility-administered medications for this visit.         The following  distinct labs were reviewed    I personally reviewed all applicable laboratory data and went over findings with patient  Significant for:    CBC RESULTS:  Recent Labs   Lab Test 02/13/23  1359 10/17/22  1010 06/20/19  1222 07/16/18  1500   WBC  --  7.5 8.3 7.4   HGB 12.6* 14.1 13.9* 13.9*   PLT  --  210 200 167        BMP RESULTS:  Recent Labs   Lab Test 02/13/23  1359 06/06/22  1144 09/08/21  1111 09/17/20  1215 07/22/19  1532 06/20/19  1222 07/16/18  1500    139 138 138 142 139 141   POTASSIUM 5.3 4.6 4.6 4.5 4.8 4.4 4.5   CHLORIDE 106 109 109 105 110* 107 107   CO2 22 26 27 24 24 23 22   ANIONGAP 13 4 2* 9 8 9 12   GLC 79 98 103* 89 100 94 88   BUN 20.6 17 15 18 21 18 16   CR 1.20* 0.99 1.17 1.17 1.07 0.91 0.90   GFRESTIMATED 64 81 62 >60 >60 >60 >60   GFRESTBLACK  --   --   --  >60 >60 >60 >60       CALCIUM RESULTS:  Recent Labs   Lab Test 02/13/23  1359 06/06/22  1144 09/08/21  1111 09/17/20  1215   PADILLA 9.4 8.8 8.0* 8.8       PTH RESULTS:  Recent Labs   Lab Test 09/29/21  1044   PTHI 50       UA RESULTS:   Recent Labs   Lab Test 12/28/22  1328   SG 1.010   URINEPH 6.5   NITRITE Negative       PSA RESULTS  Prostate Specific Antigen Screen   Date Value Ref Range Status   06/06/2022 2.14 0.00 - 4.00 ug/L Final   09/08/2021 2.26 0.00 - 4.00 ug/L Final   09/17/2020 2.0 0.0 - 6.5 ng/mL Final   07/22/2019 2.4 0.0 - 4.5 ng/mL Final   07/16/2018 2.8 0.0 - 4.5 ng/mL Final   06/22/2017 3.3 0.0 - 4.5 ng/mL Final   06/14/2016 2.4 0.0 - 4.5 ng/mL Final   03/12/2015 2.0 0.0 - 4.5 ng/mL Final              Assessment/Plan   72 year old  year old person with LUTS  -Reviewed VUDS together.  Picture not entirely diagnostic as he is capable of producing a high qMax though voiding pressures are elevated.    -One thing that could explain variable stream would be pelvic floor dysfunction vs. BPH  -Reviewed pelvic floor physical therapy and some of the general concepts related to BPH intervention  -Shared decision made to proceed with trial of PFPT followed by symptom check in 3 months    CC:  Wegener, Joel Daniel Irwin        Video-Visit Details    Type of service:  Video Visit    Video Start Time (time video started): 10:40    Video End Time (time video stopped): 10:53    Originating Location (pt. Location): Home        Distant Location (provider location):  Off-site    Mode of Communication:  Video Conference via [employer]

## 2023-02-14 NOTE — LETTER
2/14/2023       RE: Ezio Bassett  [address] [place] [address]     Dear Colleague,    Thank you for referring your patient, Ezio Bassett, to the [facility] [place] [facility]. Please see a copy of my visit note below.          UROLOGY OUTPATIENT VISIT      Chief Complaint:   LUTS      Synopsis    Ezio Bassett is a very pleasant AGE: 72 year old year old person    He has a history of nocturia/LUTS refractory to alpha blockers and anticholinergics  He had cysto with me 11/22 showing mild BPH  Now returns post VUDS:    POSTPROCEDURE DIAGNOSES:  -Maximum cystometric capacity 460 mL with normal to slightly heightened filling sensations.  -Good bladder compliance without uninhibited detrusor contractions, but possible mild terminal detrusor instability at capacity without incontinence.   -Maximum detrusor contraction during voiding reaches 84 cm H2O, which he augments with some abdominal straining. He voids 462 mL with Qmax 12 ml/s, slightly prolonged flow curve, quiet EMG activity, and complete bladder emptying (PVR 0 mL). BOOI is 37.5 which is equivocal for bladder outlet obstruction. Note that pre-study uroflowmetry demonstrates a Qmax of 26.3 ml/s with bell-shape flow curve and PVR 75 mL, which is not suggestive for significant bladder outlet obstruction.  -Fluoroscopy reveals a mildly trabeculated bladder wall without diverticuli or VUR. The bladder neck is closed during filling and open during voiding.  -Voiding diary demonstrates daily fluid intake between  oz per day which includes 23-30 oz of coffee in the morning, 17-34 oz of Diet Dr Pepper in the PM (often right before bed), the rest water or milk. He voids every 1-3 hours during the day and 2-3 times per night. Total urine output in 24 hours varies from 0070-7439 mL per day. Average voided volume is 340 mL, largest voided volume is 785 mL.      Remains symptomatic, frustrated with lack of relief to this point         Medications     Current Outpatient Medications   Medication     acetaminophen-codeine (TYLENOL #3) 300-30 MG tablet     citalopram (CELEXA) 40 MG tablet     colchicine (COLCYRS) 0.6 MG tablet     gabapentin (NEURONTIN) 100 MG capsule     ibuprofen (ADVIL/MOTRIN) 800 MG tablet     ipratropium (ATROVENT) 0.06 % nasal spray     Multiple Vitamins-Minerals (MULTIVITAMIN ADULT PO)     pantoprazole (PROTONIX) 20 MG EC tablet     pravastatin (PRAVACHOL) 40 MG tablet     traZODone (DESYREL) 50 MG tablet     triamcinolone (KENALOG) 0.5 % external ointment     No current facility-administered medications for this visit.         The following  distinct labs were reviewed    I personally reviewed all applicable laboratory data and went over findings with patient  Significant for:    CBC RESULTS:  Recent Labs   Lab Test 02/13/23  1359 10/17/22  1010 06/20/19  1222 07/16/18  1500   WBC  --  7.5 8.3 7.4   HGB 12.6* 14.1 13.9* 13.9*   PLT  --  210 200 167        BMP RESULTS:  Recent Labs   Lab Test 02/13/23  1359 06/06/22  1144 09/08/21  1111 09/17/20  1215 07/22/19  1532 06/20/19  1222 07/16/18  1500    139 138 138 142 139 141   POTASSIUM 5.3 4.6 4.6 4.5 4.8 4.4 4.5   CHLORIDE 106 109 109 105 110* 107 107   CO2 22 26 27 24 24 23 22   ANIONGAP 13 4 2* 9 8 9 12   GLC 79 98 103* 89 100 94 88   BUN 20.6 17 15 18 21 18 16   CR 1.20* 0.99 1.17 1.17 1.07 0.91 0.90   GFRESTIMATED 64 81 62 >60 >60 >60 >60   GFRESTBLACK  --   --   --  >60 >60 >60 >60       CALCIUM RESULTS:  Recent Labs   Lab Test 02/13/23  1359 06/06/22  1144 09/08/21  1111 09/17/20  1215   PADILLA 9.4 8.8 8.0* 8.8       PTH RESULTS:  Recent Labs   Lab Test 09/29/21  1044   PTHI 50       UA RESULTS:   Recent Labs   Lab Test 12/28/22  1328   SG 1.010   URINEPH 6.5   NITRITE Negative       PSA RESULTS  Prostate Specific Antigen Screen   Date Value Ref Range Status   06/06/2022 2.14 0.00 - 4.00 ug/L  Final   09/08/2021 2.26 0.00 - 4.00 ug/L Final   09/17/2020 2.0 0.0 - 6.5 ng/mL Final   07/22/2019 2.4 0.0 - 4.5 ng/mL Final   07/16/2018 2.8 0.0 - 4.5 ng/mL Final   06/22/2017 3.3 0.0 - 4.5 ng/mL Final   06/14/2016 2.4 0.0 - 4.5 ng/mL Final   03/12/2015 2.0 0.0 - 4.5 ng/mL Final              Assessment/Plan   72 year old year old person with LUTS  -Reviewed VUDS together.  Picture not entirely diagnostic as he is capable of producing a high qMax though voiding pressures are elevated.    -One thing that could explain variable stream would be pelvic floor dysfunction vs. BPH  -Reviewed pelvic floor physical therapy and some of the general concepts related to BPH intervention  -Shared decision made to proceed with trial of PFPT followed by symptom check in 3 months    CC:  Wegener, Joel Daniel Irwin        Video-Visit Details    Type of service:  Video Visit    Video Start Time (time video started): 10:40    Video End Time (time video stopped): 10:53    Originating Location (pt. Location): Home        Distant Location (provider location):  Off-site    Mode of Communication:  Video Conference via [facility]      Mario Rodriguez MD

## 2023-03-02 DIAGNOSIS — G47.00 INSOMNIA, UNSPECIFIED TYPE: ICD-10-CM

## 2023-03-03 RX ORDER — GABAPENTIN 100 MG/1
CAPSULE ORAL
Qty: 60 CAPSULE | Refills: 5 | Status: SHIPPED | OUTPATIENT
Start: 2023-03-03 | End: 2023-10-23

## 2023-03-04 ENCOUNTER — OFFICE VISIT (OUTPATIENT)
Dept: URGENT CARE | Facility: URGENT CARE | Age: 73
End: 2023-03-04
Payer: COMMERCIAL

## 2023-03-04 ENCOUNTER — NURSE TRIAGE (OUTPATIENT)
Dept: NURSING | Facility: CLINIC | Age: 73
End: 2023-03-04
Payer: COMMERCIAL

## 2023-03-04 VITALS
HEART RATE: 60 BPM | OXYGEN SATURATION: 97 % | SYSTOLIC BLOOD PRESSURE: 123 MMHG | TEMPERATURE: 97.4 F | DIASTOLIC BLOOD PRESSURE: 75 MMHG

## 2023-03-04 DIAGNOSIS — Z76.5 DRUG-SEEKING BEHAVIOR: ICD-10-CM

## 2023-03-04 DIAGNOSIS — K08.89 TOOTHACHE: Primary | ICD-10-CM

## 2023-03-04 PROCEDURE — 99214 OFFICE O/P EST MOD 30 MIN: CPT

## 2023-03-04 RX ORDER — GABAPENTIN 300 MG/1
300 CAPSULE ORAL 3 TIMES DAILY
Qty: 30 CAPSULE | Refills: 0 | Status: SHIPPED | OUTPATIENT
Start: 2023-03-04 | End: 2023-08-29

## 2023-03-04 RX ORDER — CLINDAMYCIN HCL 300 MG
300 CAPSULE ORAL 4 TIMES DAILY
Qty: 28 CAPSULE | Refills: 0 | Status: SHIPPED | OUTPATIENT
Start: 2023-03-04 | End: 2023-03-11

## 2023-03-04 NOTE — TELEPHONE ENCOUNTER
"Patient is having severe toothache pain. Patient has an appointment to have it removed 3/4/2023.  There are two teeth that are causing pain. The painful area is on the top and bottom on the right side. Patient using ice, orajel, ibuprofen and tylenol 3.  Patient reports that due to his tooth he was in the hospital for an inflammation around the heart.   Denies chest pain, fever, swelling of tongue  Protocol recommends see HCP within 4 hours  Care advice given. Patient will present to [facility] and call back with any worsening symptoms.   Zita Centeno RN   03/04/23 2:15 PM  [facility] Nurse Advisor        Reason for Disposition    [1] SEVERE pain (e.g., excruciating, unable to do any normal activities) AND [2] not improved 2 hours after pain medicine    Additional Information    Negative: Shock suspected (e.g., cold/pale/clammy skin, too weak to stand, low BP, rapid pulse)    Negative: [1] Similar pain previously AND [2] it was from \"heart attack\"    Negative: [1] Similar pain previously AND [2] it was from \"angina\" AND [3] not relieved by nitroglycerin    Negative: Sounds like a life-threatening emergency to the triager    Negative: Chest pain    Negative: Toothache followed tooth injury    Negative: Toothache or mouth pain after tooth extraction    Negative: Canker sore (i.e., aphthous ulcer)    Negative: Tongue is very swollen and tender    Negative: [1] Face is swollen AND [2] fever    Negative: Patient sounds very sick or weak to the triager    Protocols used: TOOTHACHE-A-AH      "

## 2023-03-06 NOTE — PROGRESS NOTES
Assessment & Plan     (K08.89) Toothache  (primary encounter diagnosis)  Plan: clindamycin (CLEOCIN) 300 MG capsule,         gabapentin (NEURONTIN) 300 MG capsule, magic         mouthwash suspension (diphenhydrAMINE,         lidocaine, aluminum-magnesium & simethicone)    (Z76.5) Drug-seeking behavior    Concerned about patient drug seeking.  Has a history of alcohol dependence.  Has had 8 narcotic scripts from 6 different doctors in the past 6 weeks  Declined prescribing narcotics to patient today  Advised clindamycin, gabapentin, tylenol, magic mouthwash  Follow up with dentist in 2 days.             No follow-ups on file.    Bert Mcdermott MD  [facility]    Subjective     Ezio Bassett is a 72 year old year old male who presents to clinic today for the following health issues:    Patient presents with:  Dental Pain: Right lower side pain since 2-3 weeks ago, had dental work on that side recently(maybe 1 month ago), having dental work soon again    This is a 71 yo man with a history of alcohol dependence who has r lower tooth pain for 2 weeks.  Is to have a tooth removed.  Has been using tylenol with codeine but is running out.  No ear pain, congestion, fever, sore throat, sob, cp, facial pain.        Patient Active Problem List   Diagnosis     HTN (hypertension)     Hyperlipidemia     Insomnia     LVH (left ventricular hypertrophy)     Alcohol dependence in early full remission (H)     Ex-smoker     Dysthymia       Current Outpatient Medications   Medication     citalopram (CELEXA) 40 MG tablet     clindamycin (CLEOCIN) 300 MG capsule     colchicine (COLCYRS) 0.6 MG tablet     gabapentin (NEURONTIN) 100 MG capsule     gabapentin (NEURONTIN) 300 MG capsule     ibuprofen (ADVIL/MOTRIN) 800 MG tablet     ipratropium (ATROVENT) 0.06 % nasal spray     magic mouthwash suspension (diphenhydrAMINE, lidocaine, aluminum-magnesium & simethicone)     Multiple Vitamins-Minerals  (MULTIVITAMIN ADULT PO)     pantoprazole (PROTONIX) 20 MG EC tablet     pravastatin (PRAVACHOL) 40 MG tablet     traZODone (DESYREL) 50 MG tablet     triamcinolone (KENALOG) 0.5 % external ointment     acetaminophen-codeine (TYLENOL #3) 300-30 MG tablet     No current facility-administered medications for this visit.       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 His smoking use included cigarettes. He started smoking about 56 years ago. He has a 40.00 pack-year smoking history. He has never used smokeless tobacco. He reports that he does not currently use alcohol. He reports that he does not use drugs.    Family History   Problem Relation Age of Onset     Cancer Mother      Brain Cancer Mother      Other Cancer Mother        Review of Systems  Constitutional, HEENT, cardiovascular, pulmonary, GI, , musculoskeletal, neuro, skin, endocrine and psych systems are negative, except as otherwise noted.      Objective    /75 (BP Location: Left arm, Patient Position: Sitting, Cuff Size: Adult Large)   Pulse 60   Temp 97.4  F (36.3  C) (Oral)   SpO2 97%   Physical Exam   GENERAL: healthy, alert and no distress  EYES: Eyes grossly normal to inspection, PERRL and conjunctivae and sclerae normal  HENT: ear canals and TM's normal, nose and mouth without ulcers or lesions, ttp r lower posterior tooth.  NECK: no adenopathy, no asymmetry, masses, or scars and thyroid normal to palpation  RESP: lungs clear to auscultation - no rales, rhonchi or wheezes  CV: regular rate and rhythm, normal S1 S2, no S3 or S4, no murmur, click or rub, no peripheral edema and peripheral pulses strong  ABDOMEN: soft, nontender, no hepatosplenomegaly, no masses and bowel sounds normal  MS: no gross  musculoskeletal defects noted, no edema  SKIN: no suspicious lesions or rashes  NEURO: Normal strength and tone, mentation intact and speech normal  PSYCH: mentation appears normal, affect normal/bright    \

## 2023-03-07 ENCOUNTER — TELEPHONE (OUTPATIENT)
Dept: FAMILY MEDICINE | Facility: CLINIC | Age: 73
End: 2023-03-07
Payer: COMMERCIAL

## 2023-03-07 DIAGNOSIS — K04.7 DENTAL INFECTION: Primary | ICD-10-CM

## 2023-03-07 NOTE — TELEPHONE ENCOUNTER
JW,  Patient calling  Seen in  3/4/23 for dental pain  States dentist agreed pt to continue high levels of ibuprofen due to pain  Asked to take colchicine as well, to prevent ulcers  Taking ibuprofen 800mg 3x/day   Also taking Tylenol 1000mg daily  Also Abx  Believes he will continue pain medicine for 5 more days  Pended if you approve  Or could schedule OV to review  Thanks,  Alysha THORPE RN

## 2023-03-08 ENCOUNTER — TRANSFERRED RECORDS (OUTPATIENT)
Dept: HEALTH INFORMATION MANAGEMENT | Facility: CLINIC | Age: 73
End: 2023-03-08

## 2023-03-08 RX ORDER — COLCHICINE 0.6 MG/1
0.6 TABLET ORAL 2 TIMES DAILY
Qty: 10 TABLET | Refills: 0 | Status: SHIPPED | OUTPATIENT
Start: 2023-03-08 | End: 2023-08-29

## 2023-03-08 NOTE — TELEPHONE ENCOUNTER
This would be fine.  Last renal function/lfts normal, dose appropriate. Prescription sent.   Joel Wegener,MD

## 2023-03-15 ENCOUNTER — TRANSFERRED RECORDS (OUTPATIENT)
Dept: HEALTH INFORMATION MANAGEMENT | Facility: CLINIC | Age: 73
End: 2023-03-15
Payer: COMMERCIAL

## 2023-03-16 ENCOUNTER — TELEPHONE (OUTPATIENT)
Dept: FAMILY MEDICINE | Facility: CLINIC | Age: 73
End: 2023-03-16
Payer: COMMERCIAL

## 2023-03-16 NOTE — TELEPHONE ENCOUNTER
Forms/Letter Request    Type of form/letter:   [facility]    Certification of PT plan of care  Document date: 3/15/2023    Have you been seen for this request: N/A    Do we have the form/letter: Yes:   Faxed to the [facility]    When is form/letter needed by: n/a    How would you like the form/letter returned:   Fax: [phone]    Patient Notified form requests are processed in 3-5 business days:No    Could we send this information to you in [place] or would you prefer to receive a phone call?:   N/a    Placed on Dr. Wegener's desk.

## 2023-03-17 NOTE — TELEPHONE ENCOUNTER
Forms faxed to [facility] fax # [phone] and copy sent to stat scan.     Alicia BAILEY  TC

## 2023-03-20 ENCOUNTER — DOCUMENTATION ONLY (OUTPATIENT)
Dept: OTHER | Facility: CLINIC | Age: 73
End: 2023-03-20
Payer: COMMERCIAL

## 2023-03-24 ENCOUNTER — TRANSFERRED RECORDS (OUTPATIENT)
Dept: HEALTH INFORMATION MANAGEMENT | Facility: CLINIC | Age: 73
End: 2023-03-24

## 2023-04-05 ENCOUNTER — THERAPY VISIT (OUTPATIENT)
Dept: PHYSICAL THERAPY | Facility: CLINIC | Age: 73
End: 2023-04-05
Payer: COMMERCIAL

## 2023-04-05 DIAGNOSIS — R35.1 NOCTURIA: ICD-10-CM

## 2023-04-05 DIAGNOSIS — M99.05 SOMATIC DYSFUNCTION OF PELVIS REGION: ICD-10-CM

## 2023-04-05 DIAGNOSIS — R39.198 SLOWING OF URINARY STREAM: ICD-10-CM

## 2023-04-05 PROCEDURE — 97110 THERAPEUTIC EXERCISES: CPT | Mod: GP | Performed by: PHYSICAL THERAPIST

## 2023-04-05 PROCEDURE — 97161 PT EVAL LOW COMPLEX 20 MIN: CPT | Mod: GP | Performed by: PHYSICAL THERAPIST

## 2023-04-05 PROCEDURE — 97530 THERAPEUTIC ACTIVITIES: CPT | Mod: GP | Performed by: PHYSICAL THERAPIST

## 2023-04-05 NOTE — PROGRESS NOTES
[facility]    OUTPATIENT Physical Therapy ORTHOPEDIC EVALUATION  PLAN OF TREATMENT FOR OUTPATIENT REHABILITATION  (COMPLETE FOR INITIAL CLAIMS ONLY)  Patient's Last Name, First Name, M.I.  YOB: 1950  Ezio Bassett    Provider s Name:  [facility]   Medical Record No.  [id number]   Start of Care Date:  04/05/23   Onset Date:   02/14/23 (date patient saw MD for urinary issues)   Treatment Diagnosis:  nocturia Medical Diagnosis:     Slowing of urinary stream  Nocturia  Somatic dysfunction of pelvis region       Goals:     04/05/23 0700   Voiding Patterns   Previous Functional Level No problems   Current Functional Level Number of times patient voids during night   Peformance level 2-3   STG Target Performance Reduce number of times patient voids at night   Performance Level 1-2   Rationale to establish restorative sleep pattern   Due Date 05/03/23   LTG Target Performance Reduce number of times patient voids at night   Performance Level 1   Rationale to establish restorative sleep pattern   Due Date 06/08/23         Therapy Frequency:  once a week  Predicted Duration of Therapy Intervention:  4 weeks    Lenka Elaine, PT                 I CERTIFY THE NEED FOR THESE SERVICES FURNISHED UNDER        THIS PLAN OF TREATMENT AND WHILE UNDER MY CARE     (Physician attestation of this document indicates review and certification of the therapy plan).                     Certification Date From:  04/05/23   Certification Date To:  05/03/23    Referring Provider:  Mario Rodriguez    Initial Assessment        See Epic Evaluation SOC Date: 04/05/23

## 2023-04-05 NOTE — PROGRESS NOTES
SUBJECTIVE:  Patient reports onset of symptoms urinary frequency. Symptoms include voiding every 2 hours and up 2-3 times at night.. Feels like he is voiding the same amount at night.  Since onset symptoms have been getting better, worse or staying the same? Same to worse    Urination:  Do you leak on the way to the bathroom or with a strong urge to void? No    Do you leak with cough,sneeze, jumping, running?No   Any other activities that cause leaking? No    Do you have triggers that make you feel you can't wait to go to the bathroom? NA   Type of pad and number used per day? na  When you leak what is the amount? none    How long can you delay the need to urinate? 3 - 10 minutes.   How many times do you get up to urinate at night? 2-3    Can you stop the flow of urine when on the toilet? Yes  Is the volume of urine passed usually: average. (8 sec rule=  250 ml with average bladder storing  400-600 ml)    Do you strain to pass urine? No  Do you have a slow or hesitant urinary stream? Yes  Do you have difficulty initiating the urine stream? No    Fluid intake(one glass is 8 oz or one cup) 64 glasses/day, 30caffinated glasses/day  na alcohol glasses/day.    Bowel habits:  Frequency of bowel movements?4-5 times a week  Consistency of stool? soft formed, hard, Do you ignore the urge to defecate? No  Do you strain to pass stool? No    Pelvic Pain:  Do you have pain with erections? No  Do you have pain with ejaculation? No  Do you have pain with sitting?  No  Any other activities that produce the pelvic pain    Are you sexually active?Not asked  Have you ever been worried for your physical safety? No  Any abdominal or pelvic surgeries? no  Are you having any regular exercise?walk and works with a   Have you practiced the PF(Kegel) exercises for 4 or more weeks?no  Changed diet lately?does nutri system diet    OBSERVATION  Lumbar Posture: Negative  Pelvic symmetry: Negative    ROM  Passive Hip ROM:Positive,  left    MUSCLE PERFORMANCE  Baseline PF tone:normal  Endurance: Maximum contraction in seconds: 5  # of endurance contractions before fatigue: 6    Specificity/accessory muscles: WNL/WFL, Synergy with abdominals: WNL/WFL.    PALPATION: Non-tender all superficial structures with digital evaluation      Physical Therapy Initial Evaluation  Subjective:  The history is provided by the patient. No  was used.   Patient Health History  Ezio Bassett being seen for Pelvic rehab?.            General health as reported by patient is good.  Pertinent medical history includes: none.   Red flags:  None as reported by patient.  Medical allergies: other. Other medical allergies details: Penicillin.   Surgeries include:  None.           Primary job tasks include:  Other.   Other job/home tasks details: Retired.                Therapist Generated HPI Evaluation  Problem details: Saw MD for bladder issues on 2/14/2023.  Reports has a slow stream for the last few months. Has switched to sitting during the night. Sitting upright helps. Has had some stress with household issues. In the past two to three months this has increased as had slowing of urine. Has had issues with a tooth issue causing pain over the last few months.  Notes from MD visit with urodynamic testing:   Overall normal study with good capacity and compliance. No DO or incontinence but some possible mild detrusor instability at capacity. He empties well with equivocal evidence for bladder outlet obstruction, but pre-study uroflow does not suggest significant outlet obstruction. Reviewed that his fluid intake is likely contributing to irritative LUTS. Encouraged to decrease caffeine consumption (coffee and soda) and limit fluids before bed. Will also consider switching Myrbetriq to evening dosing from AM since his main complaint is nocturia. .         Type of problem:  Pelvic dysfunction.                                                  Objective:  System                                 Pelvic Dysfunction Evaluation:    Bladder/Pelvic Problems:    Storage Problem:  Nocturia        Diagnostic Tests:        Post Void Residual:  Normal    Urodynamics:  Fairly normal              Flexibility:  Flexibility pelvic: decreased right hip flexion and ER.      Abdominal Wall:  normal        Pelvic Clock Exam:  normal            SI Provocation:  NA        Reflex Testing:  normal    External Assessment:  normal              Internal Assessment:  NA              SEMG Biofeedback:    Equipment:  Mr20 with biofeedback      Baseline EMG PM:  Normal    Peak pelvic muscle contraction:  Above 10.0uV    EMG interpretation to fatigue:  3-5 seconds  Position:  Sidelying                     General     ROS    Assessment/Plan:    Patient is a 72 year old male with pelvic complaints.    Patient has the following significant findings with corresponding treatment plan.                Diagnosis 1:  Pelvic dysfunction  Decreased ROM/flexibility - therapeutic exercise and home program  Decreased strength - therapeutic exercise, therapeutic activities and home program  Impaired muscle performance - biofeedback, neuro re-education and home program  Decreased function - therapeutic activities and home program    Therapy Evaluation Codes:   1) History comprised of:   Personal factors that impact the plan of care:      Age and Time since onset of symptoms.    Comorbidity factors that impact the plan of care are:      None.     Medications impacting care: None.  2) Examination of Body Systems comprised of:   Body structures and functions that impact the plan of care:      Pelvis.   Activity limitations that impact the plan of care are:      nocturia.  3) Clinical presentation characteristics are:   Stable/Uncomplicated.  4) Decision-Making    Low complexity using standardized patient assessment instrument and/or measureable assessment of functional outcome.  Cumulative Therapy  Evaluation is: Low complexity.    Previous and current functional limitations:  (See Goal Flow Sheet for this information)    Short term and Long term goals: (See Goal Flow Sheet for this information)     Communication ability:  Patient appears to be able to clearly communicate and understand verbal and written communication and follow directions correctly.  Treatment Explanation - The following has been discussed with the patient:   RX ordered/plan of care  Anticipated outcomes  Possible risks and side effects  This patient would benefit from PT intervention to resume normal activities.   Rehab potential is excellent.    Frequency:  1 X week, once daily  Duration:  for 4 visits  Discharge Plan:  Independent in home treatment program.  Reach maximal therapeutic benefit.    Please refer to the daily flowsheet for treatment today, total treatment time and time spent performing 1:1 timed codes.

## 2023-04-12 ENCOUNTER — THERAPY VISIT (OUTPATIENT)
Dept: PHYSICAL THERAPY | Facility: CLINIC | Age: 73
End: 2023-04-12
Payer: COMMERCIAL

## 2023-04-12 DIAGNOSIS — M99.05 SOMATIC DYSFUNCTION OF PELVIS REGION: ICD-10-CM

## 2023-04-12 DIAGNOSIS — R35.1 NOCTURIA: Primary | ICD-10-CM

## 2023-04-12 PROCEDURE — 97530 THERAPEUTIC ACTIVITIES: CPT | Mod: GP | Performed by: PHYSICAL THERAPIST

## 2023-04-12 NOTE — PROGRESS NOTES
Subjective:  HPI  Physical Exam                    Objective:  System    Physical Exam    General     ROS    Assessment/Plan:    DISCHARGE REPORT    Progress reporting period is from 4/5/2023 to 4/12/2023.       SUBJECTIVE  Subjective changes noted by patient:  .  Subjective: Tried to stop drinking earlier and only got up 2 times rather than 3.  Otherwise not having any issues with starting a stream or issues with what he is drinking.  He is not concerned about his intermittent constipation either.  He expresses more concern about other issues like neck and knee pain.  When he does get up at night, he is able to fall back asleep.    Current pain level is 0/10  .     Previous pain level was  0/10  .   Changes in function:  Yes (See Goal flowsheet attached for changes in current functional level)  Adverse reaction to treatment or activity: None    OBJECTIVE  Changes noted in objective findings:  Yes,   Objective: We discussed that patient should focus mostly on eliminating fluid before bed to decrease how many times he is getting up at night.  This would be the most beneficial change to help his bladder.  Has good pelvic muscle awareness as evaluated with biofeedback at the initial visit.     ASSESSMENT/PLAN  Updated problem list and treatment plan: Diagnosis 1:  Bladder dysfunction  Decreased ROM/flexibility - manual therapy, therapeutic exercise and home program  Impaired muscle performance - biofeedback, neuro re-education and home program  Decreased function - therapeutic activities and home program  STG/LTGs have been met or progress has been made towards goals:  Yes (See Goal flow sheet completed today.)  Assessment of Progress: The patient's condition is improving.  The patient's condition has potential to improve.  Self Management Plans:  Patient has been instructed in a home treatment program.  I have re-evaluated this patient and find that the nature, scope, duration and intensity of the therapy is  appropriate for the medical condition of the patient.  Ezio continues to require the following intervention to meet STG and LTG's:  Patient needs to continue to work on the home exercise program.      Recommendations:  This patient is ready to be discharged from therapy and continue their home treatment program.    Please refer to the daily flowsheet for treatment today, total treatment time and time spent performing 1:1 timed codes.

## 2023-04-13 ENCOUNTER — PRE VISIT (OUTPATIENT)
Dept: UROLOGY | Facility: CLINIC | Age: 73
End: 2023-04-13
Payer: COMMERCIAL

## 2023-04-13 NOTE — TELEPHONE ENCOUNTER
Reason for visit: Follow-Up; symptom check after PFPT    Dx/Hx/Sx: Slowing of urinary stream    Records/imaging/labs/orders: In EPIC    At Rooming: standard video    Fernando Espinoza, AVELINO  April 13, 2023  8:15 AM

## 2023-04-27 ENCOUNTER — TRANSFERRED RECORDS (OUTPATIENT)
Dept: HEALTH INFORMATION MANAGEMENT | Facility: CLINIC | Age: 73
End: 2023-04-27
Payer: COMMERCIAL

## 2023-05-09 ENCOUNTER — VIRTUAL VISIT (OUTPATIENT)
Dept: UROLOGY | Facility: CLINIC | Age: 73
End: 2023-05-09
Payer: COMMERCIAL

## 2023-05-09 VITALS — HEIGHT: 69 IN | BODY MASS INDEX: 27.55 KG/M2 | WEIGHT: 186 LBS

## 2023-05-09 DIAGNOSIS — N40.1 BENIGN PROSTATIC HYPERPLASIA WITH NOCTURIA: Primary | ICD-10-CM

## 2023-05-09 DIAGNOSIS — R35.1 BENIGN PROSTATIC HYPERPLASIA WITH NOCTURIA: Primary | ICD-10-CM

## 2023-05-09 PROCEDURE — 99213 OFFICE O/P EST LOW 20 MIN: CPT | Mod: VID | Performed by: UROLOGY

## 2023-05-09 ASSESSMENT — PAIN SCALES - GENERAL: PAINLEVEL: SEVERE PAIN (7)

## 2023-05-09 NOTE — PROGRESS NOTES
UROLOGY OUTPATIENT VISIT      Chief Complaint:   Nocturia      Synopsis    Ezio Bassett is a very pleasant AGE: 72 year old year old person    He has a history of progressive lower urinary tract symptoms and nocturia.  Prior urodynamics did not show evidence of obstruction and a cystoscopy showed mild BPH.  He had a 24-hour bladder diary which showed a very high fluid intake and polyuria.  He also has history of taking in substantial fluid in the evening time.  He was recommended to try an evening fluid restriction and although he does not particularly enjoy doing this it does help with his nocturia.  He also tried a course of pelvic floor physical therapy and this did not make much difference.         Medications     Current Outpatient Medications   Medication     citalopram (CELEXA) 40 MG tablet     gabapentin (NEURONTIN) 100 MG capsule     ibuprofen (ADVIL/MOTRIN) 800 MG tablet     ipratropium (ATROVENT) 0.06 % nasal spray     Multiple Vitamins-Minerals (MULTIVITAMIN ADULT PO)     pravastatin (PRAVACHOL) 40 MG tablet     traZODone (DESYREL) 50 MG tablet     triamcinolone (KENALOG) 0.5 % external ointment     acetaminophen-codeine (TYLENOL #3) 300-30 MG tablet     colchicine (COLCYRS) 0.6 MG tablet     gabapentin (NEURONTIN) 300 MG capsule     pantoprazole (PROTONIX) 20 MG EC tablet     No current facility-administered medications for this visit.         The following  distinct labs were reviewed    I personally reviewed all applicable laboratory data and went over findings with patient  Significant for:    CBC RESULTS:  Recent Labs   Lab Test 02/13/23  1359 10/17/22  1010 06/20/19  1222 07/16/18  1500   WBC  --  7.5 8.3 7.4   HGB 12.6* 14.1 13.9* 13.9*   PLT  --  210 200 167        BMP RESULTS:  Recent Labs   Lab Test 02/13/23  1359 06/06/22  1144 09/08/21  1111 09/17/20  1215 07/22/19  1532 06/20/19  1222 07/16/18  1500    139 138 138 142 139 141   POTASSIUM 5.3 4.6 4.6 4.5 4.8 4.4 4.5    CHLORIDE 106 109 109 105 110* 107 107   CO2 22 26 27 24 24 23 22   ANIONGAP 13 4 2* 9 8 9 12   GLC 79 98 103* 89 100 94 88   BUN 20.6 17 15 18 21 18 16   CR 1.20* 0.99 1.17 1.17 1.07 0.91 0.90   GFRESTIMATED 64 81 62 >60 >60 >60 >60   GFRESTBLACK  --   --   --  >60 >60 >60 >60       CALCIUM RESULTS:  Recent Labs   Lab Test 02/13/23  1359 06/06/22  1144 09/08/21  1111 09/17/20  1215   PADILLA 9.4 8.8 8.0* 8.8       PTH RESULTS:  Recent Labs   Lab Test 09/29/21  1044   PTHI 50       UA RESULTS:   Recent Labs   Lab Test 12/28/22  1328   SG 1.010   URINEPH 6.5   NITRITE Negative       PSA RESULTS  Prostate Specific Antigen Screen   Date Value Ref Range Status   06/06/2022 2.14 0.00 - 4.00 ug/L Final   09/08/2021 2.26 0.00 - 4.00 ug/L Final   09/17/2020 2.0 0.0 - 6.5 ng/mL Final   07/22/2019 2.4 0.0 - 4.5 ng/mL Final   07/16/2018 2.8 0.0 - 4.5 ng/mL Final   06/22/2017 3.3 0.0 - 4.5 ng/mL Final   06/14/2016 2.4 0.0 - 4.5 ng/mL Final   03/12/2015 2.0 0.0 - 4.5 ng/mL Final            Assessment/Plan   72 year old year old person with primary nocturia  -Recommend continued efforts to minimize evening fluids  -Discussed also elevating legs 2 hours before bedtime  -We also discussed some of the surgical management options for an enlarged prostate.  I do not have high confidence that outlet surgery would help with his primary concerns and the shared decision was made to proceed with conservative measures only at this time.    CC:  Wegener, Joel Daniel Irwin            Virtual Visit Details    Type of service:  Video Visit   Video Start Time: 10:32  Video End Time:10:38    Originating Location (pt. Location): Home    Distant Location (provider location):  Off-site  Platform used for Video Visit: Julissa

## 2023-05-09 NOTE — LETTER
5/9/2023       RE: Ezio Bassett  [address] [facility] [address]     Dear Colleague,    Thank you for referring your patient, Ezio Bassett, to the [facility] UROLOGY CLINIC [place] at [facility]. Please see a copy of my visit note below.          UROLOGY OUTPATIENT VISIT      Chief Complaint:   Nocturia      Synopsis    Ezio Bassett is a very pleasant AGE: 72 year old year old person    He has a history of progressive lower urinary tract symptoms and nocturia.  Prior urodynamics did not show evidence of obstruction and a cystoscopy showed mild BPH.  He had a 24-hour bladder diary which showed a very high fluid intake and polyuria.  He also has history of taking in substantial fluid in the evening time.  He was recommended to try an evening fluid restriction and although he does not particularly enjoy doing this it does help with his nocturia.  He also tried a course of pelvic floor physical therapy and this did not make much difference.         Medications     Current Outpatient Medications   Medication    citalopram (CELEXA) 40 MG tablet    gabapentin (NEURONTIN) 100 MG capsule    ibuprofen (ADVIL/MOTRIN) 800 MG tablet    ipratropium (ATROVENT) 0.06 % nasal spray    Multiple Vitamins-Minerals (MULTIVITAMIN ADULT PO)    pravastatin (PRAVACHOL) 40 MG tablet    traZODone (DESYREL) 50 MG tablet    triamcinolone (KENALOG) 0.5 % external ointment    acetaminophen-codeine (TYLENOL #3) 300-30 MG tablet    colchicine (COLCYRS) 0.6 MG tablet    gabapentin (NEURONTIN) 300 MG capsule    pantoprazole (PROTONIX) 20 MG EC tablet     No current facility-administered medications for this visit.         The following  distinct labs were reviewed    I personally reviewed all applicable laboratory data and went over findings with patient  Significant for:    CBC RESULTS:  Recent Labs   Lab Test 02/13/23  1359 10/17/22  1010 06/20/19  1222  07/16/18  1500   WBC  --  7.5 8.3 7.4   HGB 12.6* 14.1 13.9* 13.9*   PLT  --  210 200 167        BMP RESULTS:  Recent Labs   Lab Test 02/13/23  1359 06/06/22  1144 09/08/21  1111 09/17/20  1215 07/22/19  1532 06/20/19  1222 07/16/18  1500    139 138 138 142 139 141   POTASSIUM 5.3 4.6 4.6 4.5 4.8 4.4 4.5   CHLORIDE 106 109 109 105 110* 107 107   CO2 22 26 27 24 24 23 22   ANIONGAP 13 4 2* 9 8 9 12   GLC 79 98 103* 89 100 94 88   BUN 20.6 17 15 18 21 18 16   CR 1.20* 0.99 1.17 1.17 1.07 0.91 0.90   GFRESTIMATED 64 81 62 >60 >60 >60 >60   GFRESTBLACK  --   --   --  >60 >60 >60 >60       CALCIUM RESULTS:  Recent Labs   Lab Test 02/13/23  1359 06/06/22  1144 09/08/21  1111 09/17/20  1215   PADILLA 9.4 8.8 8.0* 8.8       PTH RESULTS:  Recent Labs   Lab Test 09/29/21  1044   PTHI 50       UA RESULTS:   Recent Labs   Lab Test 12/28/22  1328   SG 1.010   URINEPH 6.5   NITRITE Negative       PSA RESULTS  Prostate Specific Antigen Screen   Date Value Ref Range Status   06/06/2022 2.14 0.00 - 4.00 ug/L Final   09/08/2021 2.26 0.00 - 4.00 ug/L Final   09/17/2020 2.0 0.0 - 6.5 ng/mL Final   07/22/2019 2.4 0.0 - 4.5 ng/mL Final   07/16/2018 2.8 0.0 - 4.5 ng/mL Final   06/22/2017 3.3 0.0 - 4.5 ng/mL Final   06/14/2016 2.4 0.0 - 4.5 ng/mL Final   03/12/2015 2.0 0.0 - 4.5 ng/mL Final            Assessment/Plan   72 year old year old person with primary nocturia  -Recommend continued efforts to minimize evening fluids  -Discussed also elevating legs 2 hours before bedtime  -We also discussed some of the surgical management options for an enlarged prostate.  I do not have high confidence that outlet surgery would help with his primary concerns and the shared decision was made to proceed with conservative measures only at this time.    CC:  Wegener, Joel Daniel Irwin            Virtual Visit Details    Type of service:  Video Visit   Video Start Time: 10:32  Video End Time:10:38    Originating Location (pt. Location): Home    Distant  Location (provider location):  Off-site  Platform used for Video Visit: Julissa Rodriguez MD

## 2023-05-09 NOTE — NURSING NOTE
Is the patient currently in the state of [place]? YES    Visit mode:VIDEO    If the visit is dropped, the patient can be reconnected by: VIDEO VISIT: Send to e-mail at: [email or web address]    Will anyone else be joining the visit? NO      How would you like to obtain your AVS? MyChart    Are changes needed to the allergy or medication list? YES: Updated list     Reason for visit: Video Visit (3 month check up )    Ela RAJAN

## 2023-05-12 NOTE — PROGRESS NOTES
Ezio MARIE Jaredgayle attended 2 hours of group on 12/3/2019.     The group topic was processing, patient was responsive to topic.     Patient's engagement in the group session: medium     Total number of patients present 6.     Supervising MD: Dr. Thad Strange, [facility]  12/3/2019, 11:39 AM  
MD made aware. Awaiting for new orders.

## 2023-05-22 ENCOUNTER — TRANSFERRED RECORDS (OUTPATIENT)
Dept: HEALTH INFORMATION MANAGEMENT | Facility: CLINIC | Age: 73
End: 2023-05-22
Payer: COMMERCIAL

## 2023-07-14 ENCOUNTER — TELEPHONE (OUTPATIENT)
Dept: FAMILY MEDICINE | Facility: CLINIC | Age: 73
End: 2023-07-14
Payer: COMMERCIAL

## 2023-07-14 DIAGNOSIS — N52.9 ERECTILE DYSFUNCTION, UNSPECIFIED ERECTILE DYSFUNCTION TYPE: ICD-10-CM

## 2023-07-14 NOTE — TELEPHONE ENCOUNTER
JOHANA,      Patient called  Requesting Sildenafil. Asking for 6 pills at a time due to cost  Patient aware you are out office until Monday 7/17    Routing refill request to provider for review/approval because:  Drug not active on patient's medication list  Discontinued at his 1/23/23 OV.  Patient asking why it was discontinued. Informed patient not sure  (no documentation in 1/23/23 OV notes)    Future appointment for physical 10/18/23  Order T'd up from medication history list    Agata Calderon RN

## 2023-07-17 RX ORDER — SILDENAFIL 100 MG/1
50-100 TABLET, FILM COATED ORAL DAILY PRN
Qty: 6 TABLET | Refills: 11 | Status: SHIPPED | OUTPATIENT
Start: 2023-07-17

## 2023-07-31 ENCOUNTER — TELEPHONE (OUTPATIENT)
Dept: FAMILY MEDICINE | Facility: CLINIC | Age: 73
End: 2023-07-31
Payer: COMMERCIAL

## 2023-07-31 DIAGNOSIS — M54.2 NECK PAIN: Primary | ICD-10-CM

## 2023-07-31 DIAGNOSIS — M54.9 CHRONIC BACK PAIN, UNSPECIFIED BACK LOCATION, UNSPECIFIED BACK PAIN LATERALITY: ICD-10-CM

## 2023-07-31 DIAGNOSIS — G89.29 CHRONIC BACK PAIN, UNSPECIFIED BACK LOCATION, UNSPECIFIED BACK PAIN LATERALITY: ICD-10-CM

## 2023-07-31 NOTE — TELEPHONE ENCOUNTER
Pt called requesting to see if the referral was sent.   Looks like it was sent to the provider. Unclear if it was faxed to the PT.     Thanks,   Miguel Mccoy RN  [facility]

## 2023-07-31 NOTE — TELEPHONE ENCOUNTER
Triage,   Patient called.   States he needs a new referral to PT - [facility] in [place] for neck pain and back pain.   [facility]   Referral just needs to be copied/updated and faxed to location.   Patient needs this completed asap as he has an appointment this afternoon at 1PM w/ PT.   Thanks!  Alicia BHAKTA

## 2023-08-01 NOTE — TELEPHONE ENCOUNTER
Patient called to verify what information was provided on referral  States the physical therapist declined assessing his back  Is frustrated with the care provided   Plans to contact his insurance company to update  And possibly the clinic manager of [facility]   Will call back if needing referral sent to a different location  Alysha THORPE RN

## 2023-08-08 DIAGNOSIS — F34.1 DYSTHYMIA: ICD-10-CM

## 2023-08-08 RX ORDER — CITALOPRAM HYDROBROMIDE 40 MG/1
TABLET ORAL
Qty: 30 TABLET | Refills: 1 | Status: SHIPPED | OUTPATIENT
Start: 2023-08-08 | End: 2023-08-30

## 2023-08-08 NOTE — TELEPHONE ENCOUNTER
VV with DE 2/9 - hospital follow up  Due for physical.  Last 9/13/21 with former PCP JF    Future OV with JOHANA 10/23.  Refill sent for #30 with 1 refill.    Agata Calderon RN

## 2023-08-18 ENCOUNTER — OFFICE VISIT (OUTPATIENT)
Dept: FAMILY MEDICINE | Facility: CLINIC | Age: 73
End: 2023-08-18
Payer: COMMERCIAL

## 2023-08-18 VITALS
OXYGEN SATURATION: 98 % | RESPIRATION RATE: 15 BRPM | HEART RATE: 74 BPM | WEIGHT: 186 LBS | TEMPERATURE: 97.9 F | DIASTOLIC BLOOD PRESSURE: 78 MMHG | HEIGHT: 70 IN | BODY MASS INDEX: 26.63 KG/M2 | SYSTOLIC BLOOD PRESSURE: 106 MMHG

## 2023-08-18 DIAGNOSIS — L29.9 ITCHING: Primary | ICD-10-CM

## 2023-08-18 DIAGNOSIS — L98.9 SKIN LESION: ICD-10-CM

## 2023-08-18 LAB
ALBUMIN SERPL BCG-MCNC: 4.4 G/DL (ref 3.5–5.2)
ALP SERPL-CCNC: 64 U/L (ref 40–129)
ALT SERPL W P-5'-P-CCNC: 27 U/L (ref 0–70)
ANION GAP SERPL CALCULATED.3IONS-SCNC: 10 MMOL/L (ref 7–15)
AST SERPL W P-5'-P-CCNC: 32 U/L (ref 0–45)
BASOPHILS # BLD AUTO: 0.1 10E3/UL (ref 0–0.2)
BASOPHILS NFR BLD AUTO: 1 %
BILIRUB SERPL-MCNC: 0.3 MG/DL
BUN SERPL-MCNC: 16.9 MG/DL (ref 8–23)
CALCIUM SERPL-MCNC: 9.3 MG/DL (ref 8.8–10.2)
CHLORIDE SERPL-SCNC: 103 MMOL/L (ref 98–107)
CREAT SERPL-MCNC: 1.09 MG/DL (ref 0.67–1.17)
DEPRECATED HCO3 PLAS-SCNC: 27 MMOL/L (ref 22–29)
EOSINOPHIL # BLD AUTO: 0.3 10E3/UL (ref 0–0.7)
EOSINOPHIL NFR BLD AUTO: 3 %
ERYTHROCYTE [DISTWIDTH] IN BLOOD BY AUTOMATED COUNT: 11.9 % (ref 10–15)
GFR SERPL CREATININE-BSD FRML MDRD: 72 ML/MIN/1.73M2
GLUCOSE SERPL-MCNC: 77 MG/DL (ref 70–99)
HCT VFR BLD AUTO: 42.8 % (ref 40–53)
HGB BLD-MCNC: 13.8 G/DL (ref 13.3–17.7)
IMM GRANULOCYTES # BLD: 0 10E3/UL
IMM GRANULOCYTES NFR BLD: 0 %
LYMPHOCYTES # BLD AUTO: 2.2 10E3/UL (ref 0.8–5.3)
LYMPHOCYTES NFR BLD AUTO: 28 %
MCH RBC QN AUTO: 30.9 PG (ref 26.5–33)
MCHC RBC AUTO-ENTMCNC: 32.2 G/DL (ref 31.5–36.5)
MCV RBC AUTO: 96 FL (ref 78–100)
MONOCYTES # BLD AUTO: 0.8 10E3/UL (ref 0–1.3)
MONOCYTES NFR BLD AUTO: 11 %
NEUTROPHILS # BLD AUTO: 4.4 10E3/UL (ref 1.6–8.3)
NEUTROPHILS NFR BLD AUTO: 57 %
PLATELET # BLD AUTO: 209 10E3/UL (ref 150–450)
POTASSIUM SERPL-SCNC: 5.1 MMOL/L (ref 3.4–5.3)
PROT SERPL-MCNC: 6.8 G/DL (ref 6.4–8.3)
RBC # BLD AUTO: 4.47 10E6/UL (ref 4.4–5.9)
SODIUM SERPL-SCNC: 140 MMOL/L (ref 136–145)
TSH SERPL DL<=0.005 MIU/L-ACNC: 1.57 UIU/ML (ref 0.3–4.2)
WBC # BLD AUTO: 7.7 10E3/UL (ref 4–11)

## 2023-08-18 PROCEDURE — 99213 OFFICE O/P EST LOW 20 MIN: CPT

## 2023-08-18 PROCEDURE — 80053 COMPREHEN METABOLIC PANEL: CPT

## 2023-08-18 PROCEDURE — 84443 ASSAY THYROID STIM HORMONE: CPT

## 2023-08-18 PROCEDURE — 85025 COMPLETE CBC W/AUTO DIFF WBC: CPT

## 2023-08-18 PROCEDURE — 36415 COLL VENOUS BLD VENIPUNCTURE: CPT

## 2023-08-18 ASSESSMENT — PAIN SCALES - GENERAL: PAINLEVEL: SEVERE PAIN (7)

## 2023-08-18 NOTE — PROGRESS NOTES
Assessment & Plan     Itching  - Comprehensive metabolic panel (BMP + Alb, Alk Phos, ALT, AST, Total. Bili, TP); Future  - TSH with free T4 reflex; Future  - CBC with platelets and differential;     Skin lesion  - Adult Dermatology Referral; Future    Referral to dermatology given the presence of the ulcerative lesions.  I am concerned about possibility of actinic keratosis or squamous cell carcinoma with the ulcerated shiny lesions.  Unclear cause of the generalized pruritus, but I will do a lab work-up to see if there is any kidney or liver causes.  It is also possible that ulcerated lesions are from patient frequently scratching.    Alex Sanchez NP  [facility]    Elijah Varghese is a 72 year old, presenting for the following health issues:  Mole (Concerns regarding moles in multiple locations, as well as itchy forearms)      8/18/2023     4:09 PM   Additional Questions   Roomed by Eleonora HUGHES     Patient has seen some itchiness on the forearms for about 1-2 months, scalp. Middle of the back bump that has been itchy for months.  Back lesions on the back is more irregular shaped that it used to be.  He has been trying his triamcinolone on and off (but no more than several weeks at a time).  He does use a neutrogena shea moisturizer for skin hygiene.    No history of skin cancers/diagnosis.          History of Present Illness       Reason for visit:  Itchy forearms, bumps, moles    He eats 2-3 servings of fruits and vegetables daily.He consumes 0 sweetened beverage(s) daily.He exercises with enough effort to increase his heart rate 30 to 60 minutes per day.  He exercises with enough effort to increase his heart rate 5 days per week. He is missing 1 dose(s) of medications per week.       Review of Systems   Constitutional, HEENT, cardiovascular, pulmonary, gi and gu systems are negative, except as otherwise noted.      Objective    /78 (BP Location: Right arm, Patient Position:  "Sitting, Cuff Size: Adult Regular)   Pulse 74   Temp 97.9  F (36.6  C) (Temporal)   Resp 15   Ht 1.785 m (5' 10.28\")   Wt 84.4 kg (186 lb)   SpO2 98%   BMI 26.48 kg/m    Body mass index is 26.48 kg/m .  Physical Exam   GENERAL: healthy, alert and no distress  MS: no gross musculoskeletal defects noted, no edema  SKIN: Lightly erythematous scaling on the superior scalp.  Two ulcerated lesions noted, one 3 mm of the right upper back, and the other lesion 1 nn on the left upper arm.     Media Information    Document Information    Other: Photograph      08/18/2023 4:25 PM   Attached To:   Office Visit on 8/18/23 with Alex Sanchez NP   Source Information    Alex Sanchez NP [facility]      Media Information    Document Information    Other: Photograph      08/18/2023 4:25 PM   Attached To:   Office Visit on 8/18/23 with Alex Sanchez NP   Source Information    Alex Sanchez NP [facility]      Media Information    Document Information    Other: Photograph      08/18/2023 4:22 PM   Attached To:   Office Visit on 8/18/23 with Alex Sacnhez NP   Source Information    Alex Sanchez NP [facility]     NEURO: Normal strength and tone, mentation intact and speech normal  PSYCH: mentation appears normal, affect normal/bright              "

## 2023-08-18 NOTE — PATIENT INSTRUCTIONS
Aquaphor/Eucerin- heavy moisturizers  Calamine- lotion  Trimcinolone- 2 weeks at a time.  Avoid taking hot showers

## 2023-08-23 ENCOUNTER — TELEPHONE (OUTPATIENT)
Dept: DERMATOLOGY | Facility: CLINIC | Age: 73
End: 2023-08-23
Payer: COMMERCIAL

## 2023-08-23 NOTE — TELEPHONE ENCOUNTER
This encounter is being sent to inform the clinic that this patient has a referral from Alex Sanchez NP in  PRIMARY CARE for the diagnoses of skin lesion/rash and has requested that this patient be seen within 1-2 weeks.  Based on the availability of our provider(s), we are unable to accommodate this request.    Were all sites offered this patient?  Prefer [facility]    Does scheduling algorithm request to schedule next available?  Patient has been scheduled for the first available opening with Dr. Francis on 3/26/24.  We have informed the patient that the clinic will review their referral and reach out if a sooner appointment is medically necessary.

## 2023-08-29 ENCOUNTER — OFFICE VISIT (OUTPATIENT)
Dept: DERMATOLOGY | Facility: CLINIC | Age: 73
End: 2023-08-29
Payer: COMMERCIAL

## 2023-08-29 DIAGNOSIS — R20.2 NOTALGIA PARESTHETICA: Primary | ICD-10-CM

## 2023-08-29 DIAGNOSIS — L57.0 ACTINIC KERATOSIS: ICD-10-CM

## 2023-08-29 DIAGNOSIS — L82.0 KERATOSIS, INFLAMED SEBORRHEIC: ICD-10-CM

## 2023-08-29 PROCEDURE — 17003 DESTRUCT PREMALG LES 2-14: CPT | Mod: XS | Performed by: STUDENT IN AN ORGANIZED HEALTH CARE EDUCATION/TRAINING PROGRAM

## 2023-08-29 PROCEDURE — 17000 DESTRUCT PREMALG LESION: CPT | Mod: XS | Performed by: STUDENT IN AN ORGANIZED HEALTH CARE EDUCATION/TRAINING PROGRAM

## 2023-08-29 PROCEDURE — 17110 DESTRUCTION B9 LES UP TO 14: CPT | Mod: GC | Performed by: STUDENT IN AN ORGANIZED HEALTH CARE EDUCATION/TRAINING PROGRAM

## 2023-08-29 PROCEDURE — 99204 OFFICE O/P NEW MOD 45 MIN: CPT | Mod: 25 | Performed by: STUDENT IN AN ORGANIZED HEALTH CARE EDUCATION/TRAINING PROGRAM

## 2023-08-29 RX ORDER — CAPSAICIN 0.025 %
1 CREAM (GRAM) TOPICAL 3 TIMES DAILY
Qty: 25 G | Refills: 1 | Status: SHIPPED | OUTPATIENT
Start: 2023-08-29 | End: 2023-10-23

## 2023-08-29 NOTE — NURSING NOTE
Dermatology Rooming Note    Ezio Bassett's goals for this visit include:   Chief Complaint   Patient presents with    Skin Check     Pt has lesions of concern on both of his forearms and back. BRAULIO Beard, EMT-B

## 2023-08-29 NOTE — LETTER
8/29/2023       RE: Ezio Bassett  [address] [facility] [address]     Dear Colleague,    Thank you for referring your patient, Ezio Bassett, to the [facility] DERMATOLOGY CLINIC [place] at [facility]. Please see a copy of my visit note below.    [facility] Dermatology Note  Encounter Date: Aug 29, 2023  Office Visit     Dermatology Problem List:  # AK, vertex scalp s/p cryo 8/29/23  # inflamed SK s/p cryo 8/29/23  # xerosis and pruritus bilateral arms, resolving  -current tx: moisturizers  #notalgia paresthetica  -current tx: topical capsaicin  ____________________________________________    Assessment & Plan:   # AK s/p cryo; x4 vertex of the scalp  #inflamed SKs, x2 right axilla, x4 central back  -please see procedure note below    # xerosis and pruritus bilateral arms  August '23 TSH, CMP, CBC WNL.  -continue current Aquaphor treatment    # notalgia paresthetica  Distribution of hyperpigmentation on the central back most consistent with NP. Explained natural history and potential treatment options.   -START capsaicin topical therapy TID    - Cryotherapy procedure note, location(s): x2 ISKsright axilla, x4 ISKs central back, x4 AKsvertex of scalp. After verbal consent and discussion of risks and benefits including, but not limited to, dyspigmentation/scar, blister, and pain, x10 lesion(s) was(were) treated with 1-2 mm freeze border for 1-2 cycles with liquid nitrogen. Post cryotherapy instructions were provided.    Follow-up: 1-2 years for skin check, prn for new or changing lesions    Staff and Resident:     Staffed with Dr. XENA Vance.     Silverio Wilkinson, MD  Medicine-Dermatology Resident, PGY-2  ____________________________________________    CC: Skin Check (Pt has lesions of concern on both of his forearms and back. FBSE)    HPI:  Mr. Eizo Bassett is a(n) 72 year old male who presents today as a new patient  for ulcerated shiny lesions on his head.     -Itchiness on the forearms for about 1-2 months, using triamcinolone 0.5% ointment 1x every day or two  -This has improved with consistent moisturizers use (Aquaphor)  -Not much effect with taking OTC cetirizine  - has itchy spots on his scalp, been present for several months  - [facility]  - has spots on his back and his arm pit that itch and fall off from time to time  - no personal or family history of skin cancer   - history of significant sun exposure. Is a bike rider but typically wears a helmet. Does not generally use sunscreen.     Patient is otherwise feeling well, without additional skin concerns.    Labs Reviewed:  N/A    Physical Exam:  Vitals: There were no vitals taken for this visit.  SKIN: Total skin excluding the undergarment areas was performed. The exam included the head/face, neck, both arms, chest, back, abdomen, both legs, digits and/or nails.   - pedunculated waxy stuck on plaques in the R axilla  - scaly papules on the vertex of the scalp  - large hyperpigmented patch at the inferior border of the R scapula  - There are dome shaped bright red papules on the trunk and extremities.   - Multiple regular brown pigmented macules and papules are identified on the trunk and extremities.   - Scattered brown macules on sun exposed areas.  - There are waxy stuck on tan to brown papules on the trunk and extremities.   - No other lesions of concern on areas examined.     Medications:  Current Outpatient Medications   Medication    capsaicin (ZOSTRIX) 0.025 % external cream    citalopram (CELEXA) 40 MG tablet    gabapentin (NEURONTIN) 100 MG capsule    ibuprofen (ADVIL/MOTRIN) 800 MG tablet    ipratropium (ATROVENT) 0.06 % nasal spray    melatonin 5 MG tablet    Multiple Vitamins-Minerals (MULTIVITAMIN ADULT PO)    pravastatin (PRAVACHOL) 40 MG tablet    sildenafil (VIAGRA) 100 MG tablet    traZODone (DESYREL) 50  MG tablet    triamcinolone (KENALOG) 0.5 % external ointment     No current facility-administered medications for this visit.      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    Dermatology Rooming Note    Ezio Bassett's goals for this visit include:   Chief Complaint   Patient presents with    Skin Check     Pt has lesions of concern on both of his forearms and back. FBSE     Simone Beard, EMT-B

## 2023-08-29 NOTE — PATIENT INSTRUCTIONS
Some of the itching on your back might be from a condition called notalgia paresthetica. Please try a cream called capsaicin on the itchy area. Please apply it three times a day. If it is not working after 1-2 weeks of application, it will probably not be effective.       Cryotherapy    What is it?  Use of a very cold liquid, such as liquid nitrogen, to freeze and destroy abnormal skin cells that need to be removed    What should I expect?  Tenderness and redness  A small blister that might grow and fill with dark purple blood. There may be crusting.  More than one treatment may be needed if the lesions do not go away.    How do I care for the treated area?  Gently wash the area with your hands when bathing.  Use a thin layer of Vaseline to help with healing. You may use a Band-Aid.   The area should heal within 7-10 days and may leave behind a pink or lighter color.   Do not use an antibiotic or Neosporin ointment.   You may take acetaminophen (Tylenol) for pain.     Call your doctor if you have:  Severe pain  Signs of infection (warmth, redness, cloudy yellow drainage, and or a bad smell)  Questions or concerns    Who should I call with questions?      [facility]: [phone]      [facility]: [phone]      For urgent needs outside of business hours call the [facility] at [phone] and ask for the dermatology resident on call         Checking for Skin Cancer  You can help find cancer early by checking your skin each month. There are 3 main kinds of skin cancer: melanoma, basal cell carcinoma, and squamous cell carcinoma. Doing monthly skin checks is the best way to find new marks, sores, or skin changes. Follow these instructions for checking your skin.   The ABCDEs of checking moles for melanoma   Check your moles or growths for signs of melanoma using ABCDE:   Asymmetry: The sides of the mole or growth don t match.  Border: The edges  are ragged, notched, or blurred.  Color: The color within the mole or growth varies. It could be black, brown, tan, white, or shades of red, gray, or blue.  Diameter: The mole or growth is larger than   inch or 6 mm (size of a pencil eraser).  Evolving: The size, shape, texture, or color of the mole or growth is changing.     ABCDE's of moles on light skin.        ABCDE's of moles on dark skin may be harder to identify.     Checking for other types of skin cancer  Basal cell carcinoma or squamous cell carcinoma cause symptoms like:     A spot or mole that looks different from all other marks on your skin  Changes in how an area feels, such as itching, tenderness, or pain  Changes in the skin's surface, such as oozing, bleeding, or scaliness  A sore that doesn't heal  New swelling, redness, or spread of color beyond the border of a mole    Who s at risk?  Anyone of any skin color can get skin cancer. But you're at greater risk if you have:   Fair skin that freckles easily and burns instead of tanning  Light-colored or red hair  Light-colored eyes  Many moles or abnormal moles on your skin  A long history of unprotected exposure to sunlight or tanning beds  A history of many blistering sunburns as a child or teen  A family history of skin cancer  Been exposed to radiation or chemicals  A weakened immune system  Been exposed to arsenic  If you've had skin cancer in the past, you're at high risk of having it again.   How to check your skin  Do your monthly skin checkups in front of a full-length mirror. Use a room with good lighting so it's easier to see. Use a hand mirror to look at hard-to-see places like your buttocks and back. You can also have a trusted friend or family member help you with these checks. Check every part of your body, including your:   Head (ears, face, neck, and scalp)  Torso (front, back, sides, and under breasts)  Arms (tops, undersides, and armpits)  Hands (palms, backs, and fingers, including  under the nails)  Lower back, buttocks, and genitals  Legs (front, back, and sides)  Feet (tops, soles, toes, including under the nails, and between toes)  Watch for new spots on your skin or a spot that's changing in color, shape, size.   If you have a lot of moles, take digital photos of them each month. Make sure to take photos both up close and from a distance. These can help you see if any moles change over time.   Know your skin  Most skin changes aren't cancer. But if you see any changes in your skin, call your healthcare provider right away. Only they can tell you if a change is a problem. If you have skin cancer, seeing your provider can be the first step to getting the treatment that could save your life.   Brianna last reviewed this educational content on 10/1/2021    0513-8762 [employer]. All rights reserved. This information is not intended as a substitute for professional medical care. Always follow your healthcare professional's instructions.

## 2023-08-29 NOTE — PROGRESS NOTES
I have personally examined this patient and agree with the resident doctor's documentation and plan of care. I have reviewed and amended the resident's note. The documentation accurately reflects my clinical observations, diagnoses, treatment and follow-up plans. I was present for key portions of the procedure(s).       Goyo Vance MD  Dermatology Staff        [facility] Dermatology Note  Encounter Date: Aug 29, 2023  Office Visit     Dermatology Problem List:  # AK, vertex scalp s/p cryo 8/29/23  # inflamed SK s/p cryo 8/29/23  # xerosis and pruritus bilateral arms, resolving  -current tx: moisturizers  #notalgia paresthetica  -current tx: topical capsaicin  ____________________________________________    Assessment & Plan:   # AK s/p cryo; x4 vertex of the scalp  #inflamed SKs, x2 right axilla, x4 central back  -please see procedure note below    # xerosis and pruritus bilateral arms  August '23 TSH, CMP, CBC WNL.  -continue current Aquaphor treatment    # notalgia paresthetica  Distribution of hyperpigmentation on the central back most consistent with NP. Explained natural history and potential treatment options.   -START capsaicin topical therapy TID    - Cryotherapy procedure note, location(s): x2 ISKsright axilla, x4 ISKs central back, x4 AKsvertex of scalp. After verbal consent and discussion of risks and benefits including, but not limited to, dyspigmentation/scar, blister, and pain, x10 lesion(s) was(were) treated with 1-2 mm freeze border for 1-2 cycles with liquid nitrogen. Post cryotherapy instructions were provided.    Follow-up: 1-2 years for skin check, prn for new or changing lesions    Staff and Resident:     Staffed with Dr. XENA Vance.     Silverio Wilkinson MD  Medicine-Dermatology Resident, PGY-2  ____________________________________________    CC: Skin Check (Pt has lesions of concern on both of his forearms and back. FBSE)    HPI:  Mr. Ezio Harding Jaredgayle is a(n) 72 year old male  who presents today as a new patient for ulcerated shiny lesions on his head.     -Itchiness on the forearms for about 1-2 months, using triamcinolone 0.5% ointment 1x every day or two  -This has improved with consistent moisturizers use (Aquaphor)  -Not much effect with taking OTC cetirizine  - has itchy spots on his scalp, been present for several months  - [facility]  - has spots on his back and his arm pit that itch and fall off from time to time  - no personal or family history of skin cancer   - history of significant sun exposure. Is a bike rider but typically wears a helmet. Does not generally use sunscreen.     Patient is otherwise feeling well, without additional skin concerns.    Labs Reviewed:  N/A    Physical Exam:  Vitals: There were no vitals taken for this visit.  SKIN: Total skin excluding the undergarment areas was performed. The exam included the head/face, neck, both arms, chest, back, abdomen, both legs, digits and/or nails.   - pedunculated waxy stuck on plaques in the R axilla  - scaly papules on the vertex of the scalp  - large hyperpigmented patch at the inferior border of the R scapula  - There are dome shaped bright red papules on the trunk and extremities.   - Multiple regular brown pigmented macules and papules are identified on the trunk and extremities.   - Scattered brown macules on sun exposed areas.  - There are waxy stuck on tan to brown papules on the trunk and extremities.   - No other lesions of concern on areas examined.     Medications:  Current Outpatient Medications   Medication    capsaicin (ZOSTRIX) 0.025 % external cream    citalopram (CELEXA) 40 MG tablet    gabapentin (NEURONTIN) 100 MG capsule    ibuprofen (ADVIL/MOTRIN) 800 MG tablet    ipratropium (ATROVENT) 0.06 % nasal spray    melatonin 5 MG tablet    Multiple Vitamins-Minerals (MULTIVITAMIN ADULT PO)    pravastatin (PRAVACHOL) 40 MG tablet    sildenafil (VIAGRA) 100  MG tablet    traZODone (DESYREL) 50 MG tablet    triamcinolone (KENALOG) 0.5 % external ointment     No current facility-administered medications for this visit.      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

## 2023-08-30 DIAGNOSIS — F34.1 DYSTHYMIA: ICD-10-CM

## 2023-08-30 RX ORDER — CITALOPRAM HYDROBROMIDE 40 MG/1
TABLET ORAL
Qty: 90 TABLET | Refills: 0 | Status: SHIPPED | OUTPATIENT
Start: 2023-08-30 | End: 2023-10-23

## 2023-08-30 NOTE — TELEPHONE ENCOUNTER
"Requested Prescriptions   Pending Prescriptions Disp Refills    citalopram (CELEXA) 40 MG tablet [Pharmacy Med Name: CITALOPRAM HBR 40 MG TABLET] 30 tablet 1     Sig: TAKE 1 TABLET BY MOUTH EVERY DAY       SSRIs Protocol Failed - 8/30/2023 12:32 PM        Failed - PHQ-9 score less than 5 in past 6 months     Please review last PHQ-9 score.           Failed - Recent (6 mo) or future (30 days) visit within the authorizing provider's specialty     Patient had office visit in the last 6 months or has a visit in the next 30 days with authorizing provider or within the authorizing provider's specialty.  See \"Patient Info\" tab in inbasket, or \"Choose Columns\" in Meds & Orders section of the refill encounter.            Passed - Medication is active on med list        Passed - Patient is age 18 or older           Routing refill request to provider for review/approval because:  Patient needs to be seen because it has been more than 1 year since last office visit.  PHQ9 last score 7 on 10/17/22   Last OV on 8/29/23     Miguel Mccoy RN  [facility]         "

## 2023-09-21 DIAGNOSIS — G47.00 INSOMNIA, UNSPECIFIED TYPE: ICD-10-CM

## 2023-09-21 RX ORDER — TRAZODONE HYDROCHLORIDE 50 MG/1
TABLET, FILM COATED ORAL
Qty: 270 TABLET | Refills: 3 | Status: SHIPPED | OUTPATIENT
Start: 2023-09-21 | End: 2024-05-09

## 2023-09-21 NOTE — TELEPHONE ENCOUNTER
"Requested Prescriptions   Pending Prescriptions Disp Refills    traZODone (DESYREL) 50 MG tablet [Pharmacy Med Name: TRAZODONE 50 MG TABLET] 270 tablet 3     Sig: TAKE 2-3 TABLETS AT BEDTIME       Serotonin Modulators Passed - 9/21/2023  9:12 AM        Passed - Recent (12 mo) or future (30 days) visit within the authorizing provider's specialty     Patient has had an office visit with the authorizing provider or a provider within the authorizing providers department within the previous 12 mos or has a future within next 30 days. See \"Patient Info\" tab in inbasket, or \"Choose Columns\" in Meds & Orders section of the refill encounter.              Passed - Medication is active on med list        Passed - Patient is age 18 or older          Prescription approved per [facility] Refill Protocol.     Pt has an appointment on 10/23/23    Gissel Seay RN  [facility]   "

## 2023-09-27 ENCOUNTER — TRANSFERRED RECORDS (OUTPATIENT)
Dept: HEALTH INFORMATION MANAGEMENT | Facility: CLINIC | Age: 73
End: 2023-09-27
Payer: COMMERCIAL

## 2023-09-27 NOTE — TELEPHONE ENCOUNTER
Date of Service: 09/26/2023    HISTORY OF PRESENT ILLNESS:    The patient is a pleasant 69-year-old female who is being seen at the request of Radha Monaco MD, for back pain.  She has had chronic symptoms dating back many years.  She did undergo a previous surgery at L4-L5.  She has also had a spinal cord stimulator trial. Treatment thus far has included physical therapy and anti-inflammatory medications.  She has got pain that is aggravated with standing, sitting, bending.  She takes care of her  which aggravates her symptoms.    PAST MEDICAL HISTORY:    Positive for diabetes mellitus, dyslipidemia, hypertension, breast cancer.    MEDICATIONS AND ALLERGIES:    Reviewed in Epic.    SOCIAL HISTORY:    Denies smoking.    REVIEW OF SYSTEMS:    Denies fever, chills, weight loss, malaise.    PHYSICAL EXAMINATION:    On exam, the patient is a pleasant female in no acute distress.  She is alert and oriented x3.  She walks with a normal heel-toe gait pattern.  No focal motor or sensory deficits are noted in the lower extremities.  Straight leg negative.  There is no pain with hip range of motion.  Pedal pulses are palpable.  She has got hypoactive reflexes at the patellae and Achilles.    IMAGING:  X-rays of lumbar spine show significant loss of disk space height at L4-L5.    IMPRESSION AND PLAN:    Lumbar disk degeneration.  The patient is complaining of back pain without any significant radicular complaints.  She did undergo previous lumbar laminectomy at L4-L5, as well as a spinal cord stimulator trial.  X-rays of the lumbar spine taken in clinic today shows loss of disk space height at L4-L5.  Treatment options were discussed.  She is interested in starting physical therapy.  Referral was given.  If she does not improve, I will obtain an MRI of her lumbar spine.    Thank you for this interesting consultation.      Dictated By: Isaiah iWlde MD  Signing Provider: Isaiah Wilde MD    PS/vls  From RX Response       ([id number])   DD: 09/26/2023 11:23:14 AM TD: 09/26/2023 9:32:03 PM      Copy Sent To:  Radha Monaco MD

## 2023-10-06 ENCOUNTER — TELEPHONE (OUTPATIENT)
Dept: FAMILY MEDICINE | Facility: CLINIC | Age: 73
End: 2023-10-06
Payer: COMMERCIAL

## 2023-10-06 NOTE — TELEPHONE ENCOUNTER
Forms/Letter Request    Type of form/letter: PT progress note 09/27/2023    Have you been seen for this request: Yes     Do we have the form/letter: Yes: order    Who is the form from? [facility] (if other please explain)    Where did/will the form come from? form was faxed in    When is form/letter needed by:  asap    How would you like the form/letter returned: Fax : [phone]    Patient Notified form requests are processed in 3-5 business days:Yes    Could we send this information to you in [employer] or would you prefer to receive a phone call?:   No preference   Okay to leave a detailed message?: Yes at Other phone number: [phone]

## 2023-10-12 ENCOUNTER — MYC MEDICAL ADVICE (OUTPATIENT)
Dept: FAMILY MEDICINE | Facility: CLINIC | Age: 73
End: 2023-10-12
Payer: COMMERCIAL

## 2023-10-23 ENCOUNTER — OFFICE VISIT (OUTPATIENT)
Dept: FAMILY MEDICINE | Facility: CLINIC | Age: 73
End: 2023-10-23
Payer: COMMERCIAL

## 2023-10-23 VITALS
HEIGHT: 71 IN | RESPIRATION RATE: 16 BRPM | BODY MASS INDEX: 25.86 KG/M2 | WEIGHT: 184.7 LBS | TEMPERATURE: 96.9 F | HEART RATE: 67 BPM | DIASTOLIC BLOOD PRESSURE: 65 MMHG | OXYGEN SATURATION: 96 % | SYSTOLIC BLOOD PRESSURE: 110 MMHG

## 2023-10-23 DIAGNOSIS — G47.00 INSOMNIA, UNSPECIFIED TYPE: ICD-10-CM

## 2023-10-23 DIAGNOSIS — Z00.00 ENCOUNTER FOR MEDICARE ANNUAL WELLNESS EXAM: Primary | ICD-10-CM

## 2023-10-23 DIAGNOSIS — F34.1 DYSTHYMIA: ICD-10-CM

## 2023-10-23 DIAGNOSIS — Z12.5 SCREENING FOR PROSTATE CANCER: ICD-10-CM

## 2023-10-23 DIAGNOSIS — L30.9 DERMATITIS: ICD-10-CM

## 2023-10-23 DIAGNOSIS — E78.5 HYPERLIPIDEMIA, UNSPECIFIED HYPERLIPIDEMIA TYPE: ICD-10-CM

## 2023-10-23 LAB
CHOLEST SERPL-MCNC: 175 MG/DL
HDLC SERPL-MCNC: 55 MG/DL
LDLC SERPL CALC-MCNC: 102 MG/DL
NONHDLC SERPL-MCNC: 120 MG/DL
PSA SERPL DL<=0.01 NG/ML-MCNC: 2.13 NG/ML (ref 0–6.5)
TRIGL SERPL-MCNC: 90 MG/DL

## 2023-10-23 PROCEDURE — 36415 COLL VENOUS BLD VENIPUNCTURE: CPT | Performed by: FAMILY MEDICINE

## 2023-10-23 PROCEDURE — 80061 LIPID PANEL: CPT | Performed by: FAMILY MEDICINE

## 2023-10-23 PROCEDURE — G0439 PPPS, SUBSEQ VISIT: HCPCS | Performed by: FAMILY MEDICINE

## 2023-10-23 PROCEDURE — G0103 PSA SCREENING: HCPCS | Performed by: FAMILY MEDICINE

## 2023-10-23 PROCEDURE — G0008 ADMIN INFLUENZA VIRUS VAC: HCPCS | Performed by: FAMILY MEDICINE

## 2023-10-23 PROCEDURE — 90662 IIV NO PRSV INCREASED AG IM: CPT | Performed by: FAMILY MEDICINE

## 2023-10-23 RX ORDER — TRIAMCINOLONE ACETONIDE 1 MG/G
OINTMENT TOPICAL 2 TIMES DAILY
Qty: 80 G | Refills: 3 | Status: SHIPPED | OUTPATIENT
Start: 2023-10-23

## 2023-10-23 RX ORDER — PRAVASTATIN SODIUM 40 MG
40 TABLET ORAL DAILY
Qty: 90 TABLET | Refills: 3 | Status: SHIPPED | OUTPATIENT
Start: 2023-10-23

## 2023-10-23 RX ORDER — CITALOPRAM HYDROBROMIDE 40 MG/1
40 TABLET ORAL DAILY
Qty: 90 TABLET | Refills: 3 | Status: SHIPPED | OUTPATIENT
Start: 2023-10-23

## 2023-10-23 RX ORDER — GABAPENTIN 100 MG/1
100 CAPSULE ORAL
Qty: 90 CAPSULE | Refills: 5 | Status: SHIPPED | OUTPATIENT
Start: 2023-10-23

## 2023-10-23 ASSESSMENT — ENCOUNTER SYMPTOMS
SORE THROAT: 0
HEADACHES: 1
ARTHRALGIAS: 1
DIZZINESS: 0
MYALGIAS: 1
FEVER: 0
FREQUENCY: 1
WEAKNESS: 0
SHORTNESS OF BREATH: 1
NERVOUS/ANXIOUS: 1
CHILLS: 0
COUGH: 0
EYE PAIN: 1
HEMATURIA: 0
ABDOMINAL PAIN: 0
JOINT SWELLING: 0
DYSURIA: 0
DIARRHEA: 0
PALPITATIONS: 0
HEMATOCHEZIA: 0
HEARTBURN: 0
NAUSEA: 0
CONSTIPATION: 0
PARESTHESIAS: 0

## 2023-10-23 ASSESSMENT — PATIENT HEALTH QUESTIONNAIRE - PHQ9
10. IF YOU CHECKED OFF ANY PROBLEMS, HOW DIFFICULT HAVE THESE PROBLEMS MADE IT FOR YOU TO DO YOUR WORK, TAKE CARE OF THINGS AT HOME, OR GET ALONG WITH OTHER PEOPLE: SOMEWHAT DIFFICULT
SUM OF ALL RESPONSES TO PHQ QUESTIONS 1-9: 8
SUM OF ALL RESPONSES TO PHQ QUESTIONS 1-9: 8

## 2023-10-23 ASSESSMENT — ANXIETY QUESTIONNAIRES
5. BEING SO RESTLESS THAT IT IS HARD TO SIT STILL: SEVERAL DAYS
4. TROUBLE RELAXING: SEVERAL DAYS
GAD7 TOTAL SCORE: 4
6. BECOMING EASILY ANNOYED OR IRRITABLE: SEVERAL DAYS
2. NOT BEING ABLE TO STOP OR CONTROL WORRYING: NOT AT ALL
GAD7 TOTAL SCORE: 4
1. FEELING NERVOUS, ANXIOUS, OR ON EDGE: SEVERAL DAYS
IF YOU CHECKED OFF ANY PROBLEMS ON THIS QUESTIONNAIRE, HOW DIFFICULT HAVE THESE PROBLEMS MADE IT FOR YOU TO DO YOUR WORK, TAKE CARE OF THINGS AT HOME, OR GET ALONG WITH OTHER PEOPLE: SOMEWHAT DIFFICULT
3. WORRYING TOO MUCH ABOUT DIFFERENT THINGS: NOT AT ALL
7. FEELING AFRAID AS IF SOMETHING AWFUL MIGHT HAPPEN: NOT AT ALL

## 2023-10-23 ASSESSMENT — ACTIVITIES OF DAILY LIVING (ADL): CURRENT_FUNCTION: NO ASSISTANCE NEEDED

## 2023-10-23 NOTE — PATIENT INSTRUCTIONS
Patient Education   Personalized Prevention Plan  You are due for the preventive services outlined below.  Your care team is available to assist you in scheduling these services.  If you have already completed any of these items, please share that information with your care team to update in your medical record.  Health Maintenance Due   Topic Date Due     Cholesterol Lab  06/06/2023     Prostate Test  06/06/2023     ANNUAL REVIEW OF HM ORDERS  06/06/2023     Flu Vaccine (1) 09/01/2023     Learning About Depression Screening  What is depression screening?  Depression screening is a way to see if you have depression symptoms. It may be done by a doctor or counselor. It's often part of a routine checkup. That's because your mental health is just as important as your physical health.  Depression is a mental health condition that affects how you feel, think, and act. You may:  Have less energy.  Lose interest in your daily activities.  Feel sad and grouchy for a long time.  Depression is very common. It affects people of all ages.  Many things can lead to depression. Some people become depressed after they have a stroke or find out they have a major illness like cancer or heart disease. The death of a loved one or a breakup may lead to depression. It can run in families. Most experts believe that a combination of inherited genes and stressful life events can cause it.  What happens during screening?  You may be asked to fill out a form about your depression symptoms. You and the doctor will discuss your answers. The doctor may ask you more questions to learn more about how you think, act, and feel.  What happens after screening?  If you have symptoms of depression, your doctor will talk to you about your options.  Doctors usually treat depression with medicines or counseling. Often, combining the two works best. Many people don't get help because they think that they'll get over the depression on their own. But people  "with depression may not get better unless they get treatment.  The cause of depression is not well understood. There may be many factors involved. But if you have depression, it's not your fault.  A serious symptom of depression is thinking about death or suicide. If you or someone you care about talks about this or about feeling hopeless, get help right away.  It's important to know that depression can be treated. Medicine, counseling, and self-care may help.  Where can you learn more?  Go to https://www.[employer].net/patiented  Enter T185 in the search box to learn more about \"Learning About Depression Screening.\"  Current as of: October 20, 2022               Content Version: 13.7    9274-5537 [employer].   Care instructions adapted under license by your healthcare professional. If you have questions about a medical condition or this instruction, always ask your healthcare professional. [employer] disclaims any warranty or liability for your use of this information.         "

## 2023-10-23 NOTE — LETTER
My Depression Action Plan  Name: Ezio Bassett   Date of Birth 1950  Date: 10/23/2023    My doctor: Wegener, Joel Daniel Irwin   My clinic: [facility]  [address], [address]  [facility] [address] [phone]            GREEN    ZONE   Good Control    What it looks like:   Things are going generally well. You have normal ups and downs. You may even feel depressed from time to time, but bad moods usually last less than a day.   What you need to do:  Continue to care for yourself (see self care plan)  Check your depression survival kit and update it as needed  Follow your physician s recommendations including any medication.  Do not stop taking medication unless you consult with your physician first.             YELLOW         ZONE Getting Worse    What it looks like:   Depression is starting to interfere with your life.   It may be hard to get out of bed; you may be starting to isolate yourself from others.  Symptoms of depression are starting to last most all day and this has happened for several days.   You may have suicidal thoughts but they are not constant.   What you need to do:     Call your care team. Your response to treatment will improve if you keep your care team informed of your progress. Yellow periods are signs an adjustment may need to be made.     Continue your self-care.  Just get dressed and ready for the day.  Don't give yourself time to talk yourself out of it.    Talk to someone in your support network.    Open up your Depression Self-Care Plan/Wellness Kit.             RED    ZONE Medical Alert - Get Help    What it looks like:   Depression is seriously interfering with your life.   You may experience these or other symptoms: You can t get out of bed most days, can t work or engage in other necessary activities, you have trouble taking care of basic hygiene, or basic responsibilities, thoughts of suicide or death that will not  go away, self-injurious behavior.     What you need to do:  Call your care team and request a same-day appointment. If they are not available (weekends or after hours) call your local crisis line, emergency room or 911.          Depression Self-Care Plan / Wellness Kit    Many people find that medication and therapy are helpful treatments for managing depression. In addition, making small changes to your everyday life can help to boost your mood and improve your wellbeing. Below are some tips for you to consider. Be sure to talk with your medical provider and/or behavioral health consultant if your symptoms are worsening or not improving.     Sleep   Sleep hygiene  means all of the habits that support good, restful sleep. It includes maintaining a consistent bedtime and wake time, using your bedroom only for sleeping or sex, and keeping the bedroom dark and free of distractions like a computer, smartphone, or television.     Develop a Healthy Routine  Maintain good hygiene. Get out of bed in the morning, make your bed, brush your teeth, take a shower, and get dressed. Don t spend too much time viewing media that makes you feel stressed. Find time to relax each day.    Exercise  Get some form of exercise every day. This will help reduce pain and release endorphins, the  feel good  chemicals in your brain. It can be as simple as just going for a walk or doing some gardening, anything that will get you moving.      Diet  Strive to eat healthy foods, including fruits and vegetables. Drink plenty of water. Avoid excessive sugar, caffeine, alcohol, and other mood-altering substances.     Stay Connected with Others  Stay in touch with friends and family members.    Manage Your Mood  Try deep breathing, massage therapy, biofeedback, or meditation. Take part in fun activities when you can. Try to find something to smile about each day.     Psychotherapy  Be open to working with a therapist if your provider recommends it.      Medication  Be sure to take your medication as prescribed. Most anti-depressants need to be taken every day. It usually takes several weeks for medications to work. Not all medicines work for all people. It is important to follow-up with your provider to make sure you have a treatment plan that is working for you. Do not stop your medication abruptly without first discussing it with your provider.    Crisis Resources   These hotlines are for both adults and children. They and are open 24 hours a day, 7 days a week unless noted otherwise.    National Suicide Prevention Lifeline   988 or 9-280-811-PUKW (9765)    Crisis Text Line    www.crisistextline.org  Text HOME to 127317 from anywhere in the [place], anytime, about any type of crisis. A live, trained crisis counselor will receive the text and respond quickly.    [place] Lifeline for LGBTQ Youth  A national crisis intervention and suicide lifeline for LGBTQ youth under 25. Provides a safe place to talk without judgement. Call 1-927.848.9878; text START to 900636 or visit www.thetrevorproject.org to talk to a trained counselor.    For [facility] crisis numbers, visit the [facility] website at:  https://[place].gov/dhs/people-we-serve/adults/health-care/mental-health/resources/crisis-contacts.jsp

## 2023-10-23 NOTE — NURSING NOTE
Pt received flu vaccine per provider order.   Prior to immunization administration, verified patients identity using patient s name and date of birth. RN reviewed Flu vaccine screening questions with Pt - no concerns noted. Pt given VIS forms.   Patient instructed to remain in clinic for 15 minutes afterwards, and to report any adverse reactions.   Performed by Jamar Cook RN on 10/23/2023 at 9:50 AM.

## 2023-10-23 NOTE — PROGRESS NOTES
"SUBJECTIVE:   Abimael is a 73 year old who presents for Preventive Visit.      10/23/2023     8:58 AM   Additional Questions   Roomed by SAUL Roldan       Are you in the first 12 months of your Medicare coverage?  No    Healthy Habits:     In general, how would you rate your overall health?  Good    Frequency of exercise:  4-5 days/week    Duration of exercise:  30-45 minutes    Do you usually eat at least 4 servings of fruit and vegetables a day, include whole grains    & fiber and avoid regularly eating high fat or \"junk\" foods?  Yes    Taking medications regularly:  Yes    Medication side effects:  None    Ability to successfully perform activities of daily living:  No assistance needed    Home Safety:  No safety concerns identified    Hearing Impairment:  Difficulty following a conversation in a noisy restaurant or crowded room, difficulty following dialogue in the theater, need to ask people to speak up or repeat themselves and difficulty understanding soft or whispered speech    In the past 6 months, have you been bothered by leaking of urine?  No    In general, how would you rate your overall mental or emotional health?  Good    Additional concerns today:  Yes      Wellness Visit Notes:  -Last colon cancer screening completed 1/5/2023, due 1/5/2024 (impression: diverticulosis)  -Last PSA completed 6/2022 (impression: 2.14)  -Immunizations: RSV - Pt already received, COVID - Pt already received, flu - today  -Education: depression action plan given to/reviewed with Pt  -Pt concerns: hearing difficulty - Pt declines audiology referral - reports already completed one    Have you ever done Advance Care Planning? (For example, a Health Directive, POLST, or a discussion with a medical provider or your loved ones about your wishes): Yes, advance care planning is on file.       Fall risk  Fallen 2 or more times in the past year?: No  Any fall with injury in the past year?: No  Cognitive Screening   1) Repeat 3 items " (Leader, Season, Table)    2) Clock draw: NORMAL  3) 3 item recall: Recalls 2 objects   Results: NORMAL clock, 1-2 items recalled: COGNITIVE IMPAIRMENT LESS LIKELY    Mini-CogTM Copyright S Yuko. Licensed by the author for use in [facility]; reprinted with permission (sherley@[facility]). All rights reserved.      Do you have sleep apnea, excessive snoring or daytime drowsiness? : no    Reviewed and updated as needed this visit by clinical staff                  Reviewed and updated as needed this visit by Provider                 Social History     Tobacco Use    Smoking status: Former     Packs/day: 1.00     Years: 40.00     Additional pack years: 0.00     Total pack years: 40.00     Types: Cigarettes     Start date: 10/1/1966     Quit date: 5/10/2008     Years since quitting: 15.4    Smokeless tobacco: Never   Substance Use Topics    Alcohol use: Not Currently     Comment: drinks daily approx 3 drinks of vodka, last drink at 2200 yesterday             10/17/2022     9:03 AM   Alcohol Use   Prescreen: >3 drinks/day or >7 drinks/week? Not Applicable          No data to display              Do you have a current opioid prescription? No  Do you use any other controlled substances or medications that are not prescribed by a provider? None              Current providers sharing in care for this patient include:   Patient Care Team:  Wegener, Joel Daniel Irwin, MD as PCP - General (Family Medicine)  Rosetta Martinez MD as MD (Pulmonary Disease)  Goldie Bustamante CNP as Nurse Practitioner (Urology)  Wegener, Joel Daniel Irwin, MD as Assigned PCP  Mario Rodriguez MD as Assigned Surgical Provider  Joaquín Francis MD as MD (Dermapathology)  Alex Sanchez NP as Referring Physician (Nurse Practitioner Primary Care)    The following health maintenance items are reviewed in Epic and correct as of today:  Health Maintenance   Topic Date Due    DEPRESSION ACTION PLAN  Never done    RSV  VACCINE 60+ (1 - 1-dose 60+ series) Never done    LIPID  06/06/2023    PSA  06/06/2023    ANNUAL REVIEW OF HM ORDERS  06/06/2023    INFLUENZA VACCINE (1) 09/01/2023    COVID-19 Vaccine (8 - 2023-24 season) 09/01/2023    FALL RISK ASSESSMENT  10/17/2023    MEDICARE ANNUAL WELLNESS VISIT  10/17/2023    PHQ-9  02/18/2024    CMP  08/18/2024    CBC  08/18/2024    ADVANCE CARE PLANNING  03/20/2028    DTAP/TDAP/TD IMMUNIZATION (5 - Td or Tdap) 07/16/2028    COLORECTAL CANCER SCREENING  01/05/2033    HEPATITIS C SCREENING  Completed    Pneumococcal Vaccine: 65+ Years  Completed    ZOSTER IMMUNIZATION  Completed    HEPATITIS A IMMUNIZATION  Completed    HEPATITIS B IMMUNIZATION  Completed    AORTIC ANEURYSM SCREENING (SYSTEM ASSIGNED)  Completed    IPV IMMUNIZATION  Aged Out    HPV IMMUNIZATION  Aged Out    MENINGITIS IMMUNIZATION  Aged Out     Lab work is in process          Review of Systems   Constitutional:  Negative for chills and fever.   HENT:  Positive for hearing loss. Negative for congestion, ear pain and sore throat.    Eyes:  Positive for pain and visual disturbance.   Respiratory:  Positive for shortness of breath. Negative for cough.    Cardiovascular:  Negative for chest pain, palpitations and peripheral edema.   Gastrointestinal:  Negative for abdominal pain, constipation, diarrhea, heartburn, hematochezia and nausea.   Genitourinary:  Positive for frequency. Negative for dysuria, genital sores, hematuria, impotence, penile discharge and urgency.   Musculoskeletal:  Positive for arthralgias and myalgias. Negative for joint swelling.   Skin:  Negative for rash.   Neurological:  Positive for headaches. Negative for dizziness, weakness and paresthesias.   Psychiatric/Behavioral:  Negative for mood changes. The patient is nervous/anxious.          OBJECTIVE:   There were no vitals taken for this visit. Estimated body mass index is 26.48 kg/m  as calculated from the following:    Height as of 8/18/23: 1.785 m (5'  "10.28\").    Weight as of 8/18/23: 84.4 kg (186 lb).  Physical Exam  GENERAL: healthy, alert and no distress  EYES: Eyes grossly normal to inspection, PERRL and conjunctivae and sclerae normal  HENT: ear canals and TM's normal, nose and mouth without ulcers or lesions  NECK: no adenopathy, no asymmetry, masses, or scars and thyroid normal to palpation  RESP: lungs clear to auscultation - no rales, rhonchi or wheezes  CV: regular rate and rhythm, normal S1 S2, no S3 or S4, no murmur, click or rub, no peripheral edema and peripheral pulses strong  ABDOMEN: soft, nontender, no hepatosplenomegaly, no masses and bowel sounds normal  RECTAL: normal sphincter tone, no rectal masses, prostate normal size, smooth, nontender without nodules or masses  MS: no gross musculoskeletal defects noted, no edema  SKIN: no suspicious lesions or rashes  NEURO: Normal strength and tone, mentation intact and speech normal  PSYCH: mentation appears normal, affect normal/bright        ASSESSMENT / PLAN:   (Z00.00) Encounter for Medicare annual wellness exam  (primary encounter diagnosis)  Overall well, still biking over 1500 miles/year!     Wellness Visit Notes:  -Last colon cancer screening completed 1/5/2023, due 1/5/2024 (impression: diverticulosis)  -Last PSA completed 6/2022 (impression: 2.14)  -Immunizations: RSV - Pt already received, COVID - Pt already received, flu - today  -Education: depression action plan given to/reviewed with Pt  -Pt concerns: hearing difficulty - Pt declines audiology referral - reports already completed one  - reports recent derm skin check, continue every 1-2 years.     (E78.5) Hyperlipidemia, unspecified hyperlipidemia type  Comment:   Plan: Lipid panel reflex to direct LDL Non-fasting        Has refills.     (Z12.5) Screening for prostate cancer  Comment:   Plan: PROSTATE SPEC ANTIGEN SCREEN            (G47.00) Insomnia, unspecified type  Comment:   Plan: gabapentin (NEURONTIN) 100 MG capsule        " "    (F34.1) Dysthymia  Comment:   Plan: citalopram (CELEXA) 40 MG tablet              1/23/2023    11:18 AM 8/18/2023     3:53 PM 10/23/2023     8:50 AM   PHQ   PHQ-9 Total Score 8 7 8   Q9: Thoughts of better off dead/self-harm past 2 weeks Not at all Not at all Not at all     He feels controlled, no changes.     (I10) Essential hypertension  Comment: at goal, no changes.     (L30.9) Dermatitis  Comment:   Plan: triamcinolone (KENALOG) 0.1 % external ointment              Patient has been advised of split billing requirements and indicates understanding: Yes      COUNSELING:  Reviewed preventive health counseling, as reflected in patient instructions       Immunizations             Colon cancer screening      BMI:   Estimated body mass index is 26.48 kg/m  as calculated from the following:    Height as of 8/18/23: 1.785 m (5' 10.28\").    Weight as of 8/18/23: 84.4 kg (186 lb).         He reports that he quit smoking about 15 years ago. His smoking use included cigarettes. He started smoking about 57 years ago. He has a 40.00 pack-year smoking history. He has never used smokeless tobacco.      Appropriate preventive services were discussed with this patient, including applicable screening as appropriate for fall prevention, nutrition, physical activity, Tobacco-use cessation, weight loss and cognition.  Checklist reviewing preventive services available has been given to the patient.    Reviewed patients plan of care and provided an AVS. The Basic Care Plan (routine screening as documented in Health Maintenance) for Ezio meets the Care Plan requirement. This Care Plan has been established and reviewed with the Patient.          Joel Daniel Wegener, MD  [facility]    Identified Health Risks:  I have reviewed Opioid Use Disorder and Substance Use Disorder risk factors and made any needed referrals.   Answers submitted by the patient for this visit:  Patient Health Questionnaire (Submitted on " 10/23/2023)  If you checked off any problems, how difficult have these problems made it for you to do your work, take care of things at home, or get along with other people?: Somewhat difficult  PHQ9 TOTAL SCORE: 8  NATALY-7 (Submitted on 10/23/2023)  NATALY 7 TOTAL SCORE: 4  The patient's PHQ-9 score is consistent with mild depression. He was provided with information regarding depression and was advised to schedule a follow up appointment in 52 weeks to further address this issue.

## 2023-11-10 ENCOUNTER — TRANSFERRED RECORDS (OUTPATIENT)
Dept: HEALTH INFORMATION MANAGEMENT | Facility: CLINIC | Age: 73
End: 2023-11-10
Payer: COMMERCIAL

## 2023-11-14 ENCOUNTER — TELEPHONE (OUTPATIENT)
Dept: FAMILY MEDICINE | Facility: CLINIC | Age: 73
End: 2023-11-14
Payer: COMMERCIAL

## 2023-11-14 NOTE — TELEPHONE ENCOUNTER
Forms/Letter Request    Type of form/letter:  Pt progress note - date 11/10/23    Have you been seen for this request: N/A    Do we have the form/letter: Yes:     Who is the form from? [facility] (if other please explain)    Where did/will the form come from? form was faxed in    When is form/letter needed by: asap    How would you like the form/letter returned: Fax : [phone]    Patient Notified form requests are processed in 3-5 business days:No    Could we send this information to you in Actual Experience or would you prefer to receive a phone call?:   No preference   Okay to leave a detailed message?:  N/A

## 2023-11-16 NOTE — TELEPHONE ENCOUNTER
Forms faxed to [facility] fax # [phone] and copy sent to stat scan.     Alicia BHAKTA

## 2023-11-21 ENCOUNTER — PATIENT OUTREACH (OUTPATIENT)
Dept: GASTROENTEROLOGY | Facility: CLINIC | Age: 73
End: 2023-11-21
Payer: COMMERCIAL

## 2023-12-14 ENCOUNTER — TRANSFERRED RECORDS (OUTPATIENT)
Dept: HEALTH INFORMATION MANAGEMENT | Facility: CLINIC | Age: 73
End: 2023-12-14
Payer: COMMERCIAL

## 2023-12-19 ENCOUNTER — TELEPHONE (OUTPATIENT)
Dept: FAMILY MEDICINE | Facility: CLINIC | Age: 73
End: 2023-12-19
Payer: COMMERCIAL

## 2023-12-19 NOTE — TELEPHONE ENCOUNTER
Forms/Letter Request    Type of form/letter:  PT re elavuation eff 12/14/2023    Have you been seen for this request: N/A    Do we have the form/letter: Yes: order    Who is the form from?  [facility] (if other please explain)    Where did/will the form come from? form was faxed in    When is form/letter needed by: asap    How would you like the form/letter returned: Fax : [phone]    Patient Notified form requests are processed in 3-5 business days:Yes    Could we send this information to you in Qiniu or would you prefer to receive a phone call?:   No preference   Okay to leave a detailed message?: Yes at Other phone number:  [phone]

## 2023-12-20 NOTE — TELEPHONE ENCOUNTER
Forms faxed to [facility] fax # [phone] and copy sent to stat scan.     Alicia BAILEY  TC

## 2024-01-04 ENCOUNTER — TRANSFERRED RECORDS (OUTPATIENT)
Dept: HEALTH INFORMATION MANAGEMENT | Facility: CLINIC | Age: 74
End: 2024-01-04

## 2024-01-05 ENCOUNTER — PATIENT OUTREACH (OUTPATIENT)
Dept: GASTROENTEROLOGY | Facility: CLINIC | Age: 74
End: 2024-01-05
Payer: COMMERCIAL

## 2024-01-05 DIAGNOSIS — Z12.11 SPECIAL SCREENING FOR MALIGNANT NEOPLASMS, COLON: Primary | ICD-10-CM

## 2024-01-05 NOTE — PROGRESS NOTES
"Hx adenomatous polyps with poor prep on last colonoscopy performed in 2023    CRC Screening Colonoscopy Referral Review    Patient meets the inclusion criteria for screening colonoscopy standing order.    Ordering/Referring Provider:  Wegener, Joel Daniel Irwin     BMI: Estimated body mass index is 26.13 kg/m  as calculated from the following:    Height as of 10/23/23: 1.791 m (5' 10.5\").    Weight as of 10/23/23: 83.8 kg (184 lb 11.2 oz).     Sedation:  Does patient have any of the following conditions affecting sedation?  Hx of severe PTSD, anxiety, or psychosis: MAC sedation recommended    Previous Scopes:  Any previous recommendations or follow up needs based on previous scope?  Prep recommendations: Double prep recommended  add BID miralax x1 week prior to colonoscopy per 2023 procedure report    Medical Concerns to Postpone Order:  Does patient have any of the following medical concerns that should postpone/delay colonoscopy referral?  No medical conditions affecting colonoscopy referral.    Final Referral Details:  Based on patient's medical history patient is appropriate for referral order with MAC/deep sedation.   BMI<= 45 45 < BMI <= 48 48 < BMI < = 50  BMI > 50   No Restrictions No [facility]  [facility]  [place] [facility] with exceptions Hospital Only OR Only     "

## 2024-01-17 ENCOUNTER — MYC MEDICAL ADVICE (OUTPATIENT)
Dept: FAMILY MEDICINE | Facility: CLINIC | Age: 74
End: 2024-01-17
Payer: COMMERCIAL

## 2024-01-17 DIAGNOSIS — B35.3 TINEA PEDIS OF BOTH FEET: ICD-10-CM

## 2024-01-17 DIAGNOSIS — L30.9 DERMATITIS: Primary | ICD-10-CM

## 2024-01-17 DIAGNOSIS — B35.6 JOCK ITCH: ICD-10-CM

## 2024-01-19 RX ORDER — KETOCONAZOLE 20 MG/G
CREAM TOPICAL DAILY
Qty: 30 G | Refills: 3 | Status: SHIPPED | OUTPATIENT
Start: 2024-01-19

## 2024-01-30 ENCOUNTER — TELEPHONE (OUTPATIENT)
Dept: FAMILY MEDICINE | Facility: CLINIC | Age: 74
End: 2024-01-30
Payer: COMMERCIAL

## 2024-01-30 NOTE — TELEPHONE ENCOUNTER
Forms/Letter Request    Type of form/letter: OTHER: PT discharge note, document date 1/4/24     Do we have the form/letter: Yes:     Who is the form from? [facility] (if other please explain)    Where did/will the form come from? form was faxed in    When is form/letter needed by: n/a    How would you like the form/letter returned: Fax : [phone]    sign and fax back ONLY THE SIGNED PAGE    Patient Notified form requests are processed in 5-7 business days:No    Could we send this information to you in Maps InDeed or would you prefer to receive a phone call?:   No preference     Okay to leave a detailed message?: No at Other phone number:  [facility] [phone]

## 2024-01-31 ENCOUNTER — HOSPITAL ENCOUNTER (OUTPATIENT)
Facility: CLINIC | Age: 74
End: 2024-01-31
Attending: SURGERY | Admitting: SURGERY
Payer: COMMERCIAL

## 2024-01-31 ENCOUNTER — TELEPHONE (OUTPATIENT)
Dept: GASTROENTEROLOGY | Facility: CLINIC | Age: 74
End: 2024-01-31
Payer: COMMERCIAL

## 2024-01-31 NOTE — TELEPHONE ENCOUNTER
"Endoscopy Scheduling Screen    Have you had a positive Covid test in the last 14 days?  No    Are you active on MyChart?   Yes    What insurance is in the chart?  Other:  bcbs/medicare    Ordering/Referring Provider:     WEGENER, JOEL DANIEL IRWIN      (If ordering provider performs procedure, schedule with ordering provider unless otherwise instructed. )    BMI: Estimated body mass index is 26.13 kg/m  as calculated from the following:    Height as of 10/23/23: 1.791 m (5' 10.5\").    Weight as of 10/23/23: 83.8 kg (184 lb 11.2 oz).     Sedation Ordered  MAC/deep sedation.   BMI<= 45 45 < BMI <= 48 48 < BMI < = 50  BMI > 50   No Restrictions No [facility]  No [facility]  [place] [facility] with exceptions Hospital Only OR Only       Are you taking any prescription medications for pain 3 or more times per week?   NO - No RN review required.    Do you have a history of malignant hyperthermia or adverse reaction to anesthesia?  No    (Females) Are you currently pregnant?   No     Have you been diagnosed or told you have pulmonary hypertension?   No    Do you have an LVAD?  No    Have you been told you have moderate to severe sleep apnea?  No    Have you been told you have COPD, asthma, or any other lung disease?  No    Do you have any heart conditions?  No     Have you ever had an organ transplant?   No    Have you ever had or are you awaiting a heart or lung transplant?   No    Have you had a stroke or transient ischemic attack (TIA aka \"mini stroke\" in the last 6 months?   No    Have you been diagnosed with or been told you have cirrhosis of the liver?   No    Are you currently on dialysis?   No    Do you need assistance transferring?   No    BMI: Estimated body mass index is 26.13 kg/m  as calculated from the following:    Height as of 10/23/23: 1.791 m (5' 10.5\").    Weight as of 10/23/23: 83.8 kg (184 lb 11.2 oz).     Is patients BMI > 40 and scheduling location [facility]?  No    Do you take an injectable medication for weight loss or " diabetes (excluding insulin)?  No    Do you take the medication Naltrexone?  No    Do you take blood thinners?  No       Prep   Are you currently on dialysis or do you have chronic kidney disease?  No    Do you have a diagnosis of diabetes?  No    Do you have a diagnosis of cystic fibrosis (CF)?  No    On a regular basis do you go 3 -5 days between bowel movements?  No    BMI > 40?  No    Preferred Pharmacy:    [facility]/pharmacy #[id number] [facility] [address]  [address] [facility] [address] [address]  Phone: [phone] Fax: [phone]      Final Scheduling Details   Colonoscopy prep sent?  N/A    Procedure scheduled  Colonoscopy    Surgeon:       Date of procedure:  4/16/24     Pre-OP / PAC:   Yes - Patient informed of pre-op requirement.    Location  [facility] - Patient preference.    Sedation   MAC/Deep Sedation - Per order.      Patient Reminders:   You will receive a call from a Nurse to review instructions and health history.  This assessment must be completed prior to your procedure.  Failure to complete the Nurse assessment may result in the procedure being cancelled.      On the day of your procedure, please designate an adult(s) who can drive you home stay with you for the next 24 hours. The medicines used in the exam will make you sleepy. You will not be able to drive.      You cannot take public transportation, ride share services, or non-medical taxi service without a responsible caregiver.  Medical transport services are allowed with the requirement that a responsible caregiver will receive you at your destination.  We require that drivers and caregivers are confirmed prior to your procedure.

## 2024-02-16 ENCOUNTER — NURSE TRIAGE (OUTPATIENT)
Dept: FAMILY MEDICINE | Facility: CLINIC | Age: 74
End: 2024-02-16
Payer: COMMERCIAL

## 2024-02-16 NOTE — TELEPHONE ENCOUNTER
Patient calling  States his left thumb knuckle is tender  Denies redness, swelling  Pt concerned it is a bone spur   Has previously experienced this  Would like to discuss at upcoming pre-op appointment  Also noted urinary hesitancy and incomplete emptying   And increased gas   Advised to schedule sooner appointment for assessment  Patient declined at this time  Would like to review at pre-op appointment in a few weeks  Will call back if new or worsening symptoms in the mean time     Alysha THORPE RN    Reason for Disposition   MILD pain (e.g., does not interfere with normal activities) and present > 7 days    Additional Information   Negative: Followed an injury   Negative: Wound looks infected   Negative: Caused by an animal bite   Negative: Caused by frostbite   Negative: Hand or wrist pain is main symptom   Negative: Looks infected (spreading redness, red streak, pus) and severe pain with movement   Negative: Patient sounds very sick or weak to the triager   Negative: SEVERE pain (e.g., excruciating, unable to use hand at all)   Negative: Looks infected (e.g., spreading redness, red streak, pus)   Negative: Yellow pus under skin around fingernail (cuticle) or pus under fingernail   Negative: Painful blisters on fingertip   Negative: Numbness (i.e., loss of sensation) in hand or fingers of new-onset   Negative: MODERATE pain (e.g., interferes with normal activities) and present > 3 days   Negative: Swollen joint with no fever or redness   Negative: Redness and painful skin around fingernail (cuticle, nailfold)   Negative: Neck pain and pain shoots into fingers   Negative: Numbness or tingling is a chronic symptom (recurrent or ongoing AND present > 4 weeks)   Negative: Patient wants to be seen    Protocols used: Finger Pain-A-[place]

## 2024-03-01 ENCOUNTER — TELEPHONE (OUTPATIENT)
Dept: FAMILY MEDICINE | Facility: CLINIC | Age: 74
End: 2024-03-01
Payer: COMMERCIAL

## 2024-03-01 NOTE — TELEPHONE ENCOUNTER
Forms/Letter Request    Type of form/letter: OTHER: documentation date: 9-27-23  Forms were signed back in late September but signed copy wasn't fully scanned into chart.   File # [id number]       Do we have the form/letter: Yes: in JOHANA's office    Who is the form from? [facility] (if other please explain)    Where did/will the form come from? form was faxed in    When is form/letter needed by: asap    How would you like the form/letter returned: Fax : [phone]

## 2024-03-18 ENCOUNTER — TELEPHONE (OUTPATIENT)
Dept: GASTROENTEROLOGY | Facility: CLINIC | Age: 74
End: 2024-03-18
Payer: COMMERCIAL

## 2024-03-18 NOTE — TELEPHONE ENCOUNTER
Caller: Baier to Pt    Reason for Reschedule/Cancellation   (please be detailed, any staff messages or encounters to note?): Burgess ARIZA      Prior to reschedule please review:  Ordering Provider: WEGENER, JOEL DANIEL IRWIN   Sedation Determined: MAC  Does patient have any ASC Exclusions, please identify?: No      Notes on Cancelled Procedure:  Procedure: Lower Endoscopy [Colonoscopy]   Date: 04/16/2024  Location: [facility]; [facility] [place] [address]   Surgeon:       Rescheduled: No, LVM for pt to call back and sent Imagistx message  CASE IN DEPOT       Did you cancel or rescheduled an EUS procedure? No.

## 2024-03-19 ENCOUNTER — TELEPHONE (OUTPATIENT)
Dept: GASTROENTEROLOGY | Facility: CLINIC | Age: 74
End: 2024-03-19
Payer: COMMERCIAL

## 2024-03-19 DIAGNOSIS — Z12.11 ENCOUNTER FOR SCREENING COLONOSCOPY: Primary | ICD-10-CM

## 2024-03-19 RX ORDER — BISACODYL 5 MG/1
TABLET, DELAYED RELEASE ORAL
Qty: 4 TABLET | Refills: 0 | Status: SHIPPED | OUTPATIENT
Start: 2024-03-19 | End: 2024-03-28

## 2024-03-19 NOTE — TELEPHONE ENCOUNTER
Rescheduled: Yes,   Procedure: Lower Endoscopy [Colonoscopy]    Date: 03/28/2024   Location: [facility]; [address] [place] [address], [address] Floor, [place] [address]   Surgeon: Vasile   Sedation Level Scheduled  MAC ,  Reason for Sedation Level Per order    Instructions updated and sent: y     Does patient need PAC or Pre -Op Rescheduled? : no       Did you cancel or rescheduled an EUS procedure? No.

## 2024-03-19 NOTE — TELEPHONE ENCOUNTER
Pre assessment completed for upcoming procedure.      Procedure details:    Patient scheduled for Colonoscopy  on 3/28/24.     Arrival time: 0630. Procedure time 0730    [facility] [address] [place] [address], 5[address] Floor, [place] [address]. Check in location: 5th Floor.    Sedation type: MAC    Pre op exam needed? N/A    Indication for procedure: screening, history of polyps    Designated  policy reviewed. Instructed to have someone stay 24 hours post procedure.       Chart review:     Electronic implanted devices? No    Recent diagnosis of diverticulitis within the last 6 weeks?  No    Diabetic? No      Medication review:    Anticoagulants? No    NSAIDS? Yes.  Ibuprofen (Advil, Motrin).  Holding interval of 1 day before procedure.    Other medication HOLDING recommendations:  N/A      Prep for procedure:     Bowel prep recommendation: Extended Golytely. Bowel prep prescription sent to [facility]/PHARMACY #[id number] [address]   Due to: poor prep/inadequate bowel prep in the past.     Prep instructions sent via RideApart     Reviewed procedure prep instructions.     Patient verbalized understanding and had no questions or concerns at this time.        Maryana Zapien RN  Endoscopy Procedure Pre Assessment RN  [phone] option 4

## 2024-03-20 NOTE — TELEPHONE ENCOUNTER
Incoming call from patient.     Reviewed low fiber diet. Patient had no further questions at this time.     Rose Mcgill RN  Endoscopy Procedure Pre Assessment RN  [phone] option 4

## 2024-03-21 ENCOUNTER — OFFICE VISIT (OUTPATIENT)
Dept: FAMILY MEDICINE | Facility: CLINIC | Age: 74
End: 2024-03-21
Payer: COMMERCIAL

## 2024-03-21 VITALS
WEIGHT: 186 LBS | TEMPERATURE: 97.5 F | HEIGHT: 71 IN | BODY MASS INDEX: 26.04 KG/M2 | RESPIRATION RATE: 18 BRPM | DIASTOLIC BLOOD PRESSURE: 69 MMHG | HEART RATE: 63 BPM | SYSTOLIC BLOOD PRESSURE: 122 MMHG | OXYGEN SATURATION: 96 %

## 2024-03-21 DIAGNOSIS — M25.562 ACUTE PAIN OF LEFT KNEE: ICD-10-CM

## 2024-03-21 DIAGNOSIS — G47.00 INSOMNIA, UNSPECIFIED TYPE: Primary | ICD-10-CM

## 2024-03-21 DIAGNOSIS — R23.8 PIMPLES: ICD-10-CM

## 2024-03-21 DIAGNOSIS — Z12.5 SCREENING FOR PROSTATE CANCER: ICD-10-CM

## 2024-03-21 PROCEDURE — 36415 COLL VENOUS BLD VENIPUNCTURE: CPT | Performed by: FAMILY MEDICINE

## 2024-03-21 PROCEDURE — 99214 OFFICE O/P EST MOD 30 MIN: CPT | Performed by: FAMILY MEDICINE

## 2024-03-21 PROCEDURE — G0103 PSA SCREENING: HCPCS | Performed by: FAMILY MEDICINE

## 2024-03-21 ASSESSMENT — ANXIETY QUESTIONNAIRES
GAD7 TOTAL SCORE: 4
2. NOT BEING ABLE TO STOP OR CONTROL WORRYING: NOT AT ALL
4. TROUBLE RELAXING: SEVERAL DAYS
7. FEELING AFRAID AS IF SOMETHING AWFUL MIGHT HAPPEN: NOT AT ALL
7. FEELING AFRAID AS IF SOMETHING AWFUL MIGHT HAPPEN: NOT AT ALL
GAD7 TOTAL SCORE: 4
5. BEING SO RESTLESS THAT IT IS HARD TO SIT STILL: SEVERAL DAYS
6. BECOMING EASILY ANNOYED OR IRRITABLE: SEVERAL DAYS
GAD7 TOTAL SCORE: 4
1. FEELING NERVOUS, ANXIOUS, OR ON EDGE: SEVERAL DAYS
3. WORRYING TOO MUCH ABOUT DIFFERENT THINGS: NOT AT ALL
IF YOU CHECKED OFF ANY PROBLEMS ON THIS QUESTIONNAIRE, HOW DIFFICULT HAVE THESE PROBLEMS MADE IT FOR YOU TO DO YOUR WORK, TAKE CARE OF THINGS AT HOME, OR GET ALONG WITH OTHER PEOPLE: SOMEWHAT DIFFICULT
8. IF YOU CHECKED OFF ANY PROBLEMS, HOW DIFFICULT HAVE THESE MADE IT FOR YOU TO DO YOUR WORK, TAKE CARE OF THINGS AT HOME, OR GET ALONG WITH OTHER PEOPLE?: SOMEWHAT DIFFICULT

## 2024-03-21 ASSESSMENT — PATIENT HEALTH QUESTIONNAIRE - PHQ9
10. IF YOU CHECKED OFF ANY PROBLEMS, HOW DIFFICULT HAVE THESE PROBLEMS MADE IT FOR YOU TO DO YOUR WORK, TAKE CARE OF THINGS AT HOME, OR GET ALONG WITH OTHER PEOPLE: SOMEWHAT DIFFICULT
SUM OF ALL RESPONSES TO PHQ QUESTIONS 1-9: 6
SUM OF ALL RESPONSES TO PHQ QUESTIONS 1-9: 6

## 2024-03-21 ASSESSMENT — PAIN SCALES - GENERAL: PAINLEVEL: EXTREME PAIN (8)

## 2024-03-21 NOTE — PATIENT INSTRUCTIONS
Insomnia but over-groggy next day with ssome enlarged prostate symptoms and constipation:     - stop trazodone  - lower melatonin to 1-2 mg   - would like to wean off citalopram start ith 1/2 pill = 20mg for one month and if mood still good then ok to stop      Left knee pain/bursitis: recommend aggressive compression icing 15 minutes 4-5 times/day for threedays and ibuprofen 800mg twice daily for those three days as well.  (Just not before th colonoscopy as you have been instructed)    Right ear earrly pimple: apply bacitracin daily with cotton swab gently for one week

## 2024-03-21 NOTE — PROGRESS NOTES
"  Insomnia but over-groggy next day with ssome enlarged prostate symptoms and constipation:     - stop trazodone  - lower melatonin to 1-2 mg   - would like to wean off citalopram start ith 1/2 pill = 20mg for one month and if mood still good then ok to stop      Left knee pain/bursitis: recommend aggressive compression icing 15 minutes 4-5 times/day for threedays and ibuprofen 800mg twice daily for those three days as well.  (Just not before th colonoscopy as you have been instructed)    Right ear earrly pimple: apply bacitracin daily with cotton swab gently for one week    Psa desired after discussing.   Assessment & Plan     Insomnia, unspecified type      Acute pain of left knee      Pimples      Screening for prostate cancer    - PSA, screen; Future  - PSA, screen      35 minutes spent by me on the date of the encounter doing chart review, history and exam, documentation and further activities per the note  Follow up octoner for wellness visit.     BMI  Estimated body mass index is 26.31 kg/m  as calculated from the following:    Height as of this encounter: 1.791 m (5' 10.5\").    Weight as of this encounter: 84.4 kg (186 lb).             Elijah Varghese is a 73 year old, presenting for the following health issues:  Prostate Cancer        3/21/2024     1:09 PM   Additional Questions   Roomed by Jean RAJAN     History of Present Illness       Reason for visit:  Dr seema    He eats 2-3 servings of fruits and vegetables daily.He consumes 0 sweetened beverage(s) daily.He exercises with enough effort to increase his heart rate 30 to 60 minutes per day.  He exercises with enough effort to increase his heart rate 4 days per week.   He is taking medications regularly.     Prostate cancer concerns - Primary   -Weak urination  -Frequent and elongated urination  -\"closing tube, rather than pushing out\"        Pimple in R Ear - Tertiary     Increased fatigue - believes may be from trazodone, sleeping well actually/groggy in " "am.   -Reduced trazadone dossage from 2 tabs to 1 tab per night  -Went off and then on melatonin    L Knee Pain - Additional  -Sharp shooting pain when bent to about a 70 degree salome  -In discussion with  PT for ways to improve, but wants another opinion                      Objective    /69   Pulse 63   Temp 97.5  F (36.4  C) (Temporal)   Resp 18   Ht 1.791 m (5' 10.5\")   Wt 84.4 kg (186 lb)   SpO2 96%   BMI 26.31 kg/m    Body mass index is 26.31 kg/m .  Physical Exam   Left knee effusion compared to right and pain/crepitus with palpation anterior lateral knee joint line.             Signed Electronically by: Joel Daniel Wegener, MD    "

## 2024-03-22 LAB — PSA SERPL DL<=0.01 NG/ML-MCNC: 2.13 NG/ML (ref 0–6.5)

## 2024-03-27 ENCOUNTER — ANESTHESIA EVENT (OUTPATIENT)
Dept: SURGERY | Facility: AMBULATORY SURGERY CENTER | Age: 74
End: 2024-03-27
Payer: COMMERCIAL

## 2024-03-27 ASSESSMENT — LIFESTYLE VARIABLES: TOBACCO_USE: 1

## 2024-03-27 NOTE — ANESTHESIA PREPROCEDURE EVALUATION
Anesthesia Pre-Procedure Evaluation    Patient: Ezio Bassett   MRN: [id number] : 1950        Procedure : Procedure(s):  Colonoscopy          Past Medical History:   Diagnosis Date    Anxiety     Arthritis     Deaf, right     4 years old    Depressive disorder     High cholesterol     HTN (hypertension)     LVH (left ventricular hypertrophy)       Past Surgical History:   Procedure Laterality Date    BIOPSY SKIN (LOCATION)      EYE SURGERY      Tube from left eye to nose for draining tears.    TONSILLECTOMY        Allergies   Allergen Reactions    Penicillins Hives      Social History     Tobacco Use    Smoking status: Former     Packs/day: 1.00     Years: 40.00     Additional pack years: 0.00     Total pack years: 40.00     Types: Cigarettes     Start date: 10/1/1966     Quit date: 2008     Years since quitting: 15.8    Smokeless tobacco: Never   Substance Use Topics    Alcohol use: Not Currently     Comment: drinks daily approx 3 drinks of vodka, last drink at 2200 yesterday      Wt Readings from Last 1 Encounters:   24 84.4 kg (186 lb)        Anesthesia Evaluation            ROS/MED HX  ENT/Pulmonary:     (+)                tobacco use, Past use,                       Neurologic:       Cardiovascular:     (+)  hypertension- -   -  - -                                 Previous cardiac testing   Echo: Date:  Results:   1. Normal LV size, normal wall thickness, normal global systolic function with an estimated EF of 55 - 60%.    2. The mitral valve is sclerotic.    3. The aortic sinus is dilated with a maximal diameter of 4.2 cm.    4. Trivial pericardial effusion.     Stress Test:  Date: Results:    ECG Reviewed:  Date: Results:    Cath:  Date: Results:      METS/Exercise Tolerance:     Hematologic:       Musculoskeletal:       GI/Hepatic:       Renal/Genitourinary:       Endo:       Psychiatric/Substance Use:       Infectious Disease:       Malignancy:       Other:            Physical  "Exam    Airway        Mallampati: II       Respiratory Devices and Support         Dental       (+) Modest Abnormalities - crowns, retainers, 1 or 2 missing teeth      Cardiovascular          Rhythm and rate: regular     Pulmonary           breath sounds clear to auscultation           OUTSIDE LABS:  CBC:   Lab Results   Component Value Date    WBC 7.7 08/18/2023    WBC 7.5 10/17/2022    HGB 13.8 08/18/2023    HGB 12.6 (L) 02/13/2023    HCT 42.8 08/18/2023    HCT 42.0 10/17/2022     08/18/2023     10/17/2022     BMP:   Lab Results   Component Value Date     08/18/2023     02/13/2023    POTASSIUM 5.1 08/18/2023    POTASSIUM 5.3 02/13/2023    CHLORIDE 103 08/18/2023    CHLORIDE 106 02/13/2023    CO2 27 08/18/2023    CO2 22 02/13/2023    BUN 16.9 08/18/2023    BUN 20.6 02/13/2023    CR 1.09 08/18/2023    CR 1.20 (H) 02/13/2023    GLC 77 08/18/2023    GLC 79 02/13/2023     COAGS:   Lab Results   Component Value Date    INR 1.03 06/20/2019     POC: No results found for: \"BGM\", \"HCG\", \"HCGS\"  HEPATIC:   Lab Results   Component Value Date    ALBUMIN 4.4 08/18/2023    PROTTOTAL 6.8 08/18/2023    ALT 27 08/18/2023    AST 32 08/18/2023    ALKPHOS 64 08/18/2023    BILITOTAL 0.3 08/18/2023     OTHER:   Lab Results   Component Value Date    PADILLA 9.3 08/18/2023    MAG 2.0 06/20/2019    TSH 1.57 08/18/2023    SED 40 (H) 02/13/2023       Anesthesia Plan    ASA Status:  2    NPO Status:  NPO Appropriate    Anesthesia Type: MAC.     - Reason for MAC: immobility needed              Consents    Anesthesia Plan(s) and associated risks, benefits, and realistic alternatives discussed. Questions answered and patient/representative(s) expressed understanding.     - Discussed:     - Discussed with:  Patient            Postoperative Care            Comments:               Neri Montilla MD    I have reviewed the pertinent notes and labs in the chart from the past 30 days and (re)examined the patient.  Any updates " "or changes from those notes are reflected in this note.              # Overweight: Estimated body mass index is 26.31 kg/m  as calculated from the following:    Height as of 3/21/24: 1.791 m (5' 10.5\").    Weight as of 3/21/24: 84.4 kg (186 lb).      "

## 2024-03-28 ENCOUNTER — ANESTHESIA (OUTPATIENT)
Dept: SURGERY | Facility: AMBULATORY SURGERY CENTER | Age: 74
End: 2024-03-28
Payer: COMMERCIAL

## 2024-03-28 ENCOUNTER — HOSPITAL ENCOUNTER (OUTPATIENT)
Facility: AMBULATORY SURGERY CENTER | Age: 74
Discharge: HOME | End: 2024-03-28
Attending: STUDENT IN AN ORGANIZED HEALTH CARE EDUCATION/TRAINING PROGRAM
Payer: COMMERCIAL

## 2024-03-28 VITALS
DIASTOLIC BLOOD PRESSURE: 69 MMHG | SYSTOLIC BLOOD PRESSURE: 115 MMHG | RESPIRATION RATE: 14 BRPM | OXYGEN SATURATION: 96 % | TEMPERATURE: 96.8 F | HEART RATE: 71 BPM

## 2024-03-28 VITALS — HEART RATE: 71 BPM

## 2024-03-28 LAB
COLONOSCOPY: NORMAL
PATH REPORT.COMMENTS IMP SPEC: NORMAL
PATH REPORT.COMMENTS IMP SPEC: NORMAL
PATH REPORT.FINAL DX SPEC: NORMAL
PATH REPORT.GROSS SPEC: NORMAL
PATH REPORT.MICROSCOPIC SPEC OTHER STN: NORMAL
PATH REPORT.RELEVANT HX SPEC: NORMAL
PHOTO IMAGE: NORMAL

## 2024-03-28 PROCEDURE — 99100 ANES PT EXTEME AGE<1 YR&>70: CPT | Performed by: NURSE ANESTHETIST, CERTIFIED REGISTERED

## 2024-03-28 PROCEDURE — 88305 TISSUE EXAM BY PATHOLOGIST: CPT | Mod: TC | Performed by: STUDENT IN AN ORGANIZED HEALTH CARE EDUCATION/TRAINING PROGRAM

## 2024-03-28 PROCEDURE — 45385 COLONOSCOPY W/LESION REMOVAL: CPT | Mod: PT | Performed by: STUDENT IN AN ORGANIZED HEALTH CARE EDUCATION/TRAINING PROGRAM

## 2024-03-28 PROCEDURE — 88305 TISSUE EXAM BY PATHOLOGIST: CPT | Mod: 26 | Performed by: PATHOLOGY

## 2024-03-28 PROCEDURE — 45385 COLONOSCOPY W/LESION REMOVAL: CPT | Performed by: ANESTHESIOLOGY

## 2024-03-28 PROCEDURE — 99100 ANES PT EXTEME AGE<1 YR&>70: CPT | Performed by: ANESTHESIOLOGY

## 2024-03-28 PROCEDURE — 45385 COLONOSCOPY W/LESION REMOVAL: CPT | Performed by: NURSE ANESTHETIST, CERTIFIED REGISTERED

## 2024-03-28 RX ORDER — ONDANSETRON 2 MG/ML
4 INJECTION INTRAMUSCULAR; INTRAVENOUS
Status: DISCONTINUED | OUTPATIENT
Start: 2024-03-28 | End: 2024-03-29 | Stop reason: HOSPADM

## 2024-03-28 RX ORDER — SODIUM CHLORIDE, SODIUM LACTATE, POTASSIUM CHLORIDE, CALCIUM CHLORIDE 600; 310; 30; 20 MG/100ML; MG/100ML; MG/100ML; MG/100ML
INJECTION, SOLUTION INTRAVENOUS CONTINUOUS
Status: DISCONTINUED | OUTPATIENT
Start: 2024-03-28 | End: 2024-03-29 | Stop reason: HOSPADM

## 2024-03-28 RX ORDER — PROPOFOL 10 MG/ML
INJECTION, EMULSION INTRAVENOUS CONTINUOUS PRN
Status: DISCONTINUED | OUTPATIENT
Start: 2024-03-28 | End: 2024-03-28

## 2024-03-28 RX ORDER — PROPOFOL 10 MG/ML
INJECTION, EMULSION INTRAVENOUS PRN
Status: DISCONTINUED | OUTPATIENT
Start: 2024-03-28 | End: 2024-03-28

## 2024-03-28 RX ORDER — LIDOCAINE 40 MG/G
CREAM TOPICAL
Status: DISCONTINUED | OUTPATIENT
Start: 2024-03-28 | End: 2024-03-29 | Stop reason: HOSPADM

## 2024-03-28 RX ORDER — LIDOCAINE HYDROCHLORIDE 20 MG/ML
INJECTION, SOLUTION INFILTRATION; PERINEURAL PRN
Status: DISCONTINUED | OUTPATIENT
Start: 2024-03-28 | End: 2024-03-28

## 2024-03-28 RX ADMIN — PROPOFOL 50 MG: 10 INJECTION, EMULSION INTRAVENOUS at 07:21

## 2024-03-28 RX ADMIN — PROPOFOL 150 MCG/KG/MIN: 10 INJECTION, EMULSION INTRAVENOUS at 07:21

## 2024-03-28 RX ADMIN — LIDOCAINE HYDROCHLORIDE 50 MG: 20 INJECTION, SOLUTION INFILTRATION; PERINEURAL at 07:21

## 2024-03-28 NOTE — ANESTHESIA CARE TRANSFER NOTE
Patient: Ezio Bassett    Procedure: Procedure(s):  COLONOSCOPY, WITH POLYPECTOMY       Diagnosis: Special screening for malignant neoplasms, colon [Z12.11]  Diagnosis Additional Information: No value filed.    Anesthesia Type:   MAC     Note:    Oropharynx: oropharynx clear of all foreign objects and spontaneously breathing  Level of Consciousness: awake  Oxygen Supplementation: room air    Independent Airway: airway patency satisfactory and stable  Dentition: dentition unchanged  Vital Signs Stable: post-procedure vital signs reviewed and stable  Report to RN Given: handoff report given  Patient transferred to: Phase II    Handoff Report: Identifed the Patient, Identified the Reponsible Provider, Reviewed the pertinent medical history, Discussed the surgical course, Reviewed Intra-OP anesthesia mangement and issues during anesthesia, Set expectations for post-procedure period and Allowed opportunity for questions and acknowledgement of understanding      Vitals:  Vitals Value Taken Time   /61 03/28/24 0804   Temp 36  C (96.8  F) 03/28/24 0804   Pulse 71    Resp 14 03/28/24 0804   SpO2 97 % 03/28/24 0804       Electronically Signed By: CHHAYA De La O CRNA  March 28, 2024  8:06 AM

## 2024-03-28 NOTE — DISCHARGE INSTRUCTIONS
Discharge Instructions after Colonoscopy  or Sigmoidoscopy    Today you had a ____ Colonoscopy ____ Sigmoidoscopy    Activity and Diet  You were given medicine for pain. You may be dizzy or sleepy.  For 24 hours:   Do not drive or use heavy equipment.   Do not make important decisions.   Do not drink any alcohol.  You may return to your normal diet and medicines.    Discomfort   Air was placed in your colon during the exam in order to see it. Walking helps to pass the air.   You may take Tylenol (acetaminophen) for pain unless your doctor has told you not to.  Do not take aspirin or ibuprofen (Advil, Motrin, or other anti-inflammatory  drugs) for _____ days.    Follow-up  ____ We took small tissue samples or polyps to study. Your doctor will call you with the results  within two weeks.    When to call:    Call right away if you have:   Unusual pain in belly or chest pain not relieved with passing air.   More than 1 to 2 Tablespoons of bleeding from your rectum.   Fever above 100.6  F (37.5  C).    If you have severe pain, bleeding, or shortness of breath, go to an emergency room.    If you have questions, call:  Monday to Friday, 8 a.m. to 4:30 p.m.  Central Scheduling Department: [phone]    After hours  [facility]: [phone] (Ask for the GI fellow on call)

## 2024-03-28 NOTE — LETTER
"April 1, 2024      Abimael Bassett  [address] [facility] [address]        Dear ,    We are writing to inform you of your test results.    The polyps removed during your colonoscopy returned as benign, hyperplastic polyps. These are not precancerous polyps and there was NO cancer in the polyps.     Given the finding of this type of polyp, I recommend that you consider a colonoscopy in ten years after discussion with your primary care provider at that time. However, a sooner examination might be necessary if you start developing any symptoms such as rectal bleeding, change in bowel habits, anemia, etc.      It has been a pleasure to participate in your care.  Please call our clinic if you have any questions or concerns.        Resulted Orders   Surgical Pathology Exam   Result Value Ref Range    Case Report       Surgical Pathology Report                         Case: [id number]                                  Authorizing Provider:  Hiral Burnette MD          Collected:           03/28/2024 07:56 AM          Ordering Location:     [facility] OR  Received:            03/28/2024 08:49 AM                                 [place]                                                                  Pathologist:           Digna Zabala MD                                                           Specimen:    Large Intestine, Colon, Sigmoid, rectosigmoid polyp                                        Final Diagnosis       RECTOSIGMOID COLON, POLYP, BIOPSY:  Hyperplastic polyp        Clinical Information       Patient is a 73-year-old man; screening colonoscopy       Gross Description       A(1). Large Intestine, Colon, Sigmoid, rectosigmoid polyp:  The specimen is received in formalin with proper patient identification, labeled \"rectosigmoid polyp\".  The specimen consists of a single pink-tan irregular soft tissue fragment, 0.8 x 0.2 x 0.2 cm, which is serially sectioned and entirely " submitted in A1.       Microscopic Description       Microscopic examination has been performed.      I have personally reviewed all specimens and or slides, including the listed special stains, and used them with my medical judgement to determine the final diagnosis.       Performing Labs       The technical component of this testing was completed at [facility]      Case Images         If you have any questions or concerns, please call the clinic at the number listed above.       Sincerely,      Hiral Burnette MD

## 2024-03-28 NOTE — H&P
[facility]  [id number]  male  73 year old      Reason for procedure/surgery: surveillance colonoscopy  8 mm TA 2019  No family history    Patient Active Problem List   Diagnosis    HTN (hypertension)    Hyperlipidemia    Insomnia    LVH (left ventricular hypertrophy)    Alcohol dependence in early full remission (H)    Ex-smoker    Dysthymia    Nocturia       Past Surgical History:    Past Surgical History:   Procedure Laterality Date    BIOPSY SKIN (LOCATION)      EYE SURGERY      Tube from left eye to nose for draining tears.    TONSILLECTOMY         Past Medical History:   Past Medical History:   Diagnosis Date    Anxiety     Arthritis     Deaf, right     4 years old    Depressive disorder     High cholesterol     HTN (hypertension)     LVH (left ventricular hypertrophy)        Social History:   Social History     Tobacco Use    Smoking status: Former     Packs/day: 1.00     Years: 40.00     Additional pack years: 0.00     Total pack years: 40.00     Types: Cigarettes     Start date: 10/1/1966     Quit date: 5/11/2008     Years since quitting: 15.8    Smokeless tobacco: Never   Substance Use Topics    Alcohol use: Not Currently     Comment: drinks daily approx 3 drinks of vodka, last drink at 2200 yesterday       Family History:   Family History   Problem Relation Age of Onset    Cancer Mother     Brain Cancer Mother     Other Cancer Mother        Allergies:   Allergies   Allergen Reactions    Penicillins Hives       Active Medications:   Current Outpatient Medications   Medication Sig Dispense Refill    citalopram (CELEXA) 40 MG tablet Take 1 tablet (40 mg) by mouth daily 90 tablet 3    gabapentin (NEURONTIN) 100 MG capsule Take 1 capsule (100 mg) by mouth nightly as needed (anxiety/sleep) 90 capsule 5    ibuprofen (ADVIL/MOTRIN) 800 MG tablet TAKE 1 TABLET BY MOUTH UP TO 3 TIMES A DAY AS NEEDED FOR PAIN 90 tablet 3    ipratropium (ATROVENT) 0.06 % nasal spray Spray 2 sprays into both nostrils 3 times  daily      melatonin 5 MG tablet Take 5 mg by mouth nightly as needed for sleep      Multiple Vitamins-Minerals (MULTIVITAMIN ADULT PO) Take 1 tablet by mouth      pravastatin (PRAVACHOL) 40 MG tablet Take 1 tablet (40 mg) by mouth daily 90 tablet 3    sildenafil (VIAGRA) 100 MG tablet Take 0.5-1 tablets ( mg) by mouth daily as needed (erectile dysfunction.  ok to dispense generic) 6 tablet 11    traZODone (DESYREL) 50 MG tablet TAKE 2-3 TABLETS AT BEDTIME (Patient taking differently: Take 50 mg by mouth at bedtime Take 1 tablets at bedtime) 270 tablet 3    ketoconazole (NIZORAL) 2 % external cream Apply topically daily (Patient taking differently: Apply topically daily as needed for irritation) 30 g 3    triamcinolone (KENALOG) 0.1 % external ointment Apply topically 2 times daily (Patient taking differently: Apply topically daily as needed for irritation) 80 g 3       Systemic Review:   CONSTITUTIONAL: NEGATIVE for fever, chills, change in weight  ENT/MOUTH: NEGATIVE for ear, mouth and throat problems  RESP: NEGATIVE for significant cough or SOB  CV: NEGATIVE for chest pain, palpitations or peripheral edema    Physical Examination:   Vital Signs: /77   Pulse 71   Temp 98  F (36.7  C) (Temporal)   Resp 16   SpO2 95%   GENERAL: healthy, alert and no distress  NECK: no adenopathy, no asymmetry, masses, or scars  RESP: lungs clear to auscultation - no rales, rhonchi or wheezes  CV: regular rate and rhythm, normal S1 S2, no S3 or S4, no murmur, click or rub, no peripheral edema and peripheral pulses strong  ABDOMEN: soft, nontender, no hepatosplenomegaly, no masses and bowel sounds normal  MS: no gross musculoskeletal defects noted, no edema    Plan: Appropriate to proceed as scheduled.      Hiral Burnette MD  3/28/2024    PCP:  Wegener, Joel Daniel Irwin

## 2024-03-28 NOTE — ANESTHESIA POSTPROCEDURE EVALUATION
Patient: Ezio Bassett    Procedure: Procedure(s):  COLONOSCOPY, WITH POLYPECTOMY       Anesthesia Type:  MAC    Note:  Disposition: Outpatient   Postop Pain Control: Uneventful            Sign Out: Well controlled pain   PONV: No   Neuro/Psych: Uneventful            Sign Out: Acceptable/Baseline neuro status   Airway/Respiratory: Uneventful            Sign Out: Acceptable/Baseline resp. status   CV/Hemodynamics: Uneventful            Sign Out: Acceptable CV status; No obvious hypovolemia; No obvious fluid overload   Other NRE: NONE   DID A NON-ROUTINE EVENT OCCUR?            Last vitals:  Vitals Value Taken Time   /69 03/28/24 0822   Temp 36  C (96.8  F) 03/28/24 0804   Pulse     Resp 14 03/28/24 0822   SpO2 96 % 03/28/24 0822       Electronically Signed By: Neri Montilla MD  March 28, 2024  11:47 AM

## 2024-04-03 ENCOUNTER — TELEPHONE (OUTPATIENT)
Dept: GASTROENTEROLOGY | Facility: CLINIC | Age: 74
End: 2024-04-03
Payer: COMMERCIAL

## 2024-04-03 NOTE — TELEPHONE ENCOUNTER
PATIENT CALLED IN FRUSTRATED AND WANTED RESULTS FOR COLONOSCOPY. I LET HIM KNOW THAT HE SHOULD REACH OUT TO HIS PRIMARY ( ORDERING PROVIDER) FOR RESULTS BUT HE WAS VERY UPSET AND DIDN'T WANT TO TALK TO HIM HE WANTED DR REID. SEND HER  A MESSAGE TO REACH OUT TO PATIENT FOR HIS CONCERNS

## 2024-04-30 ENCOUNTER — MYC MEDICAL ADVICE (OUTPATIENT)
Dept: FAMILY MEDICINE | Facility: CLINIC | Age: 74
End: 2024-04-30
Payer: COMMERCIAL

## 2024-05-06 ASSESSMENT — ANXIETY QUESTIONNAIRES
6. BECOMING EASILY ANNOYED OR IRRITABLE: SEVERAL DAYS
IF YOU CHECKED OFF ANY PROBLEMS ON THIS QUESTIONNAIRE, HOW DIFFICULT HAVE THESE PROBLEMS MADE IT FOR YOU TO DO YOUR WORK, TAKE CARE OF THINGS AT HOME, OR GET ALONG WITH OTHER PEOPLE: SOMEWHAT DIFFICULT
7. FEELING AFRAID AS IF SOMETHING AWFUL MIGHT HAPPEN: NOT AT ALL
8. IF YOU CHECKED OFF ANY PROBLEMS, HOW DIFFICULT HAVE THESE MADE IT FOR YOU TO DO YOUR WORK, TAKE CARE OF THINGS AT HOME, OR GET ALONG WITH OTHER PEOPLE?: SOMEWHAT DIFFICULT
GAD7 TOTAL SCORE: 3
5. BEING SO RESTLESS THAT IT IS HARD TO SIT STILL: SEVERAL DAYS
2. NOT BEING ABLE TO STOP OR CONTROL WORRYING: NOT AT ALL
4. TROUBLE RELAXING: SEVERAL DAYS
3. WORRYING TOO MUCH ABOUT DIFFERENT THINGS: NOT AT ALL
GAD7 TOTAL SCORE: 3
1. FEELING NERVOUS, ANXIOUS, OR ON EDGE: NOT AT ALL
7. FEELING AFRAID AS IF SOMETHING AWFUL MIGHT HAPPEN: NOT AT ALL

## 2024-05-09 ENCOUNTER — VIRTUAL VISIT (OUTPATIENT)
Dept: FAMILY MEDICINE | Facility: CLINIC | Age: 74
End: 2024-05-09
Payer: COMMERCIAL

## 2024-05-09 DIAGNOSIS — N40.1 BENIGN PROSTATIC HYPERPLASIA WITH NOCTURIA: Primary | ICD-10-CM

## 2024-05-09 DIAGNOSIS — F51.01 PRIMARY INSOMNIA: ICD-10-CM

## 2024-05-09 DIAGNOSIS — R39.15 URINARY URGENCY: ICD-10-CM

## 2024-05-09 DIAGNOSIS — R35.1 BENIGN PROSTATIC HYPERPLASIA WITH NOCTURIA: Primary | ICD-10-CM

## 2024-05-09 DIAGNOSIS — R35.1 NOCTURIA: ICD-10-CM

## 2024-05-09 DIAGNOSIS — K59.00 CONSTIPATION, UNSPECIFIED CONSTIPATION TYPE: ICD-10-CM

## 2024-05-09 DIAGNOSIS — F10.21 ALCOHOL DEPENDENCE IN EARLY FULL REMISSION (H): ICD-10-CM

## 2024-05-09 PROCEDURE — G2211 COMPLEX E/M VISIT ADD ON: HCPCS | Mod: 95 | Performed by: FAMILY MEDICINE

## 2024-05-09 PROCEDURE — 99214 OFFICE O/P EST MOD 30 MIN: CPT | Mod: 95 | Performed by: FAMILY MEDICINE

## 2024-05-09 RX ORDER — TAMSULOSIN HYDROCHLORIDE 0.4 MG/1
0.4 CAPSULE ORAL DAILY
Qty: 90 CAPSULE | Refills: 0 | Status: SHIPPED | OUTPATIENT
Start: 2024-05-09 | End: 2024-08-02

## 2024-05-09 RX ORDER — TEMAZEPAM 7.5 MG/1
7.5-15 CAPSULE ORAL
Qty: 60 CAPSULE | Refills: 0 | Status: SHIPPED | OUTPATIENT
Start: 2024-05-09 | End: 2024-05-10

## 2024-05-09 NOTE — PATIENT INSTRUCTIONS
"    Nocturia/urgency - with equivocal voiding studies earlier and mixed symptoms of benign prostatic hypertrophy and spastic bladder.    - re-try tamsulosin 0.4 mg daily.     Constipation - start miralax three times daily for one week then decrease to one dose daily and add senna twice daily for bowel re-training/stimulant    Mental health/was trying to wean meds: Stopped trazodone and weaned melatonin to off and has not decreased citalopram yet. Seems to have to get up now every hour .  States \"goes to the bathroom\"    Trial temazepam/restoril 7.5 mg nightly for 2-3 nights then can increase to two tablets nightly if not working.  Goal to get 3-4 hours sleep/night to help energy/mood and also hopefully re-train bladder to not have to urinate so often.   "

## 2024-05-09 NOTE — PROGRESS NOTES
"Abimael is a 73 year old who is being evaluated via a billable video visit.    How would you like to obtain your AVS? MyChart  If the video visit is dropped, the invitation should be resent by: Text to cell phone: [phone]  Will anyone else be joining your video visit? No      Assessment & Plan       Nocturia/urgency - with equivocal voiding studies earlier and mixed symptoms of benign prostatic hypertrophy and spastic bladder.    - re-try tamsulosin 0.4 mg daily.     Constipation - start miralax three times daily for one week then decrease to one dose daily and add senna twice daily for bowel re-training/stimulant    Mental health/was trying to wean meds: Stopped trazodone and weaned melatonin to off and has not decreased citalopram yet. Seems to have to get up now every hour .  States \"goes to the bathroom\"    Trial temazepam/restoril 7.5 mg nightly for 2-3 nights then can increase to two tablets nightly if not working.  Goal to get 3-4 hours sleep/night to help energy/mood and also hopefully re-train bladder to not have to urinate so often.   Benign prostatic hyperplasia with nocturia    - tamsulosin (FLOMAX) 0.4 MG capsule; Take 1 capsule (0.4 mg) by mouth daily    Primary insomnia      Alcohol dependence in early full remission (H)      Nocturia      Urinary urgency      Constipation, unspecified constipation type    The longitudinal plan of care for the diagnosis(es)/condition(s) as documented were addressed during this visit. Due to the added complexity in care, I will continue to support Abimael in the subsequent management and with ongoing continuity of care.          BMI  Estimated body mass index is 26.31 kg/m  as calculated from the following:    Height as of 3/21/24: 1.791 m (5' 10.5\").    Weight as of 3/21/24: 84.4 kg (186 lb).             Subjective   Abimael is a 73 year old, presenting for the following health issues:  Urinary Problem        3/21/2024     1:09 PM   Additional Questions   Roomed by Jean RAJAN "       Video Start Time:  5:32    History of Present Illness       Reason for visit:  1-Trouble peeing/prostate 2-After-dribble 3-Constipation 4-Sleep*/Trazadone stopped Tu 6/23/24 / Sad?    He eats 4 or more servings of fruits and vegetables daily.He consumes 0 sweetened beverage(s) daily.He exercises with enough effort to increase his heart rate 10 to 19 minutes per day.  He exercises with enough effort to increase his heart rate 4 days per week.   He is taking medications regularly.                   Objective    Vitals - Patient Reported  Pain Score: No Pain (0)      Vitals:  No vitals were obtained today due to virtual visit.    Physical Exam   GENERAL: alert and no distress  EYES: Eyes grossly normal to inspection.  No discharge or erythema, or obvious scleral/conjunctival abnormalities.  RESP: No audible wheeze, cough, or visible cyanosis.    SKIN: Visible skin clear. No significant rash, abnormal pigmentation or lesions.  NEURO: Cranial nerves grossly intact.  Mentation and speech appropriate for age.  PSYCH: Appropriate affect, tone, and pace of words          Video-Visit Details    Type of service:  Video Visit   Video End Time:6:08 PM  Originating Location (pt. Location): Home    Distant Location (provider location):  On-site  Platform used for Video Visit: Julissa  Signed Electronically by: Joel Daniel Wegener, MD

## 2024-05-10 ENCOUNTER — TELEPHONE (OUTPATIENT)
Dept: FAMILY MEDICINE | Facility: CLINIC | Age: 74
End: 2024-05-10
Payer: COMMERCIAL

## 2024-05-10 DIAGNOSIS — G47.00 INSOMNIA, UNSPECIFIED TYPE: Primary | ICD-10-CM

## 2024-05-10 RX ORDER — TEMAZEPAM 15 MG/1
15 CAPSULE ORAL
Qty: 30 CAPSULE | Refills: 0 | Status: SHIPPED | OUTPATIENT
Start: 2024-05-10 | End: 2024-06-13

## 2024-05-10 NOTE — TELEPHONE ENCOUNTER
According to denial info temazepam 15 mg is covered which is fine.  Let Abimael know I re-sent prescription as 15 mg pill size.   Joel Wegener,MD

## 2024-05-10 NOTE — TELEPHONE ENCOUNTER
JW    Please see below: medication was denied for patient.    If you would like to start appeal, please write Letter of Medical Necessity ID number [id number] under the letter tab and send back to [facility] [place] MED POOL ([id number])    Thank you,    Agata Calderon RN

## 2024-06-12 ASSESSMENT — PATIENT HEALTH QUESTIONNAIRE - PHQ9
10. IF YOU CHECKED OFF ANY PROBLEMS, HOW DIFFICULT HAVE THESE PROBLEMS MADE IT FOR YOU TO DO YOUR WORK, TAKE CARE OF THINGS AT HOME, OR GET ALONG WITH OTHER PEOPLE: VERY DIFFICULT
SUM OF ALL RESPONSES TO PHQ QUESTIONS 1-9: 15
SUM OF ALL RESPONSES TO PHQ QUESTIONS 1-9: 15

## 2024-06-13 ENCOUNTER — VIRTUAL VISIT (OUTPATIENT)
Dept: FAMILY MEDICINE | Facility: CLINIC | Age: 74
End: 2024-06-13
Payer: COMMERCIAL

## 2024-06-13 DIAGNOSIS — F34.1 DYSTHYMIA: Primary | ICD-10-CM

## 2024-06-13 DIAGNOSIS — G47.00 INSOMNIA, UNSPECIFIED TYPE: ICD-10-CM

## 2024-06-13 PROCEDURE — 99214 OFFICE O/P EST MOD 30 MIN: CPT | Mod: 95 | Performed by: FAMILY MEDICINE

## 2024-06-13 PROCEDURE — G2211 COMPLEX E/M VISIT ADD ON: HCPCS | Mod: 95 | Performed by: FAMILY MEDICINE

## 2024-06-13 RX ORDER — MIRTAZAPINE 15 MG/1
15 TABLET, FILM COATED ORAL AT BEDTIME
Qty: 90 TABLET | Refills: 1 | Status: SHIPPED | OUTPATIENT
Start: 2024-06-13

## 2024-06-13 NOTE — PROGRESS NOTES
"Abimael is a 73 year old who is being evaluated via a billable video visit.    How would you like to obtain your AVS? MyChart  If the video visit is dropped, the invitation should be resent by: Text to cell phone: [phone]  Will anyone else be joining your video visit? No      Assessment & Plan     Dysthymia  Does feel citalopram has helped reasonably well and temazepam for sleep.  Urinating much less often with flomax.  Hoping for some improvement still.     Discussed trial of mirtazepine at bedtime for sleep/mood with goal of increased mood and also less need for temazepam with goal of using temazepam only 1-3 nights/week at most.     Again discussed possible side effects of lethargy, off balance, dizziness, falls/fractures.     No suspicious activity on [place] rx monitoring database     Follow up October planned for annual wellness.   - mirtazapine (REMERON) 15 MG tablet; Take 1 tablet (15 mg) by mouth at bedtime    Insomnia, unspecified type    - mirtazapine (REMERON) 15 MG tablet; Take 1 tablet (15 mg) by mouth at bedtime      The longitudinal plan of care for the diagnosis(es)/condition(s) as documented were addressed during this visit. Due to the added complexity in care, I will continue to support Abimael in the subsequent management and with ongoing continuity of care.      BMI  Estimated body mass index is 26.31 kg/m  as calculated from the following:    Height as of 3/21/24: 1.791 m (5' 10.5\").    Weight as of 3/21/24: 84.4 kg (186 lb).             Subjective   Abimael is a 73 year old, presenting for the following health issues:  Nocturia, Insomnia, and Constipation        6/13/2024     3:13 PM   Additional Questions   Roomed by Amy HUGHES     Video Start Time:  5:01    History of Present Illness       Mental Health Follow-up:  Patient presents to follow-up on Depression.Patient's depression since last visit has been:  Worse  The patient is having other symptoms associated with depression.      Any significant life " events: No  Patient is not feeling anxious or having panic attacks.  Patient has no concerns about alcohol or drug use.    Reason for visit:  Poor sleep & tired too much.  Better urination.  Depression.    He eats 4 or more servings of fruits and vegetables daily.He consumes 0 sweetened beverage(s) daily.He exercises with enough effort to increase his heart rate 20 to 29 minutes per day.  He exercises with enough effort to increase his heart rate 4 days per week.   He is taking medications regularly.                   Objective    Vitals - Patient Reported  Weight (Patient Reported): 80.6 kg (177 lb 9.6 oz)  Pain Score: No Pain (0)      Vitals:  No vitals were obtained today due to virtual visit.    Physical Exam   GENERAL: alert and no distress  EYES: Eyes grossly normal to inspection.  No discharge or erythema, or obvious scleral/conjunctival abnormalities.  RESP: No audible wheeze, cough, or visible cyanosis.    SKIN: Visible skin clear. No significant rash, abnormal pigmentation or lesions.  NEURO: Cranial nerves grossly intact.  Mentation and speech appropriate for age.  PSYCH: Appropriate affect, tone, and pace of words          Video-Visit Details    Type of service:  Video Visit   Video End Time:5:25 PM  Originating Location (pt. Location): Home    Distant Location (provider location):  On-site  Platform used for Video Visit: Julissa  Signed Electronically by: Joel Daniel Wegener, MD

## 2024-07-05 DIAGNOSIS — G47.00 INSOMNIA, UNSPECIFIED TYPE: ICD-10-CM

## 2024-07-05 NOTE — TELEPHONE ENCOUNTER
Requesting refill.   Patient states mirtazapine is not helpful.   Has been taking temazepam and tradazone, which has been helpful.   Thanks!  Alicia BHAKTA

## 2024-07-09 RX ORDER — TEMAZEPAM 15 MG/1
15 CAPSULE ORAL
Qty: 30 CAPSULE | Refills: 0 | Status: SHIPPED | OUTPATIENT
Start: 2024-07-09 | End: 2024-09-06

## 2024-08-02 DIAGNOSIS — R35.1 BENIGN PROSTATIC HYPERPLASIA WITH NOCTURIA: ICD-10-CM

## 2024-08-02 DIAGNOSIS — N40.1 BENIGN PROSTATIC HYPERPLASIA WITH NOCTURIA: ICD-10-CM

## 2024-08-02 RX ORDER — TAMSULOSIN HYDROCHLORIDE 0.4 MG/1
0.4 CAPSULE ORAL DAILY
Qty: 90 CAPSULE | Refills: 3 | Status: SHIPPED | OUTPATIENT
Start: 2024-08-02

## 2024-09-04 DIAGNOSIS — G47.00 INSOMNIA, UNSPECIFIED TYPE: ICD-10-CM

## 2024-09-06 RX ORDER — TEMAZEPAM 15 MG/1
15 CAPSULE ORAL
Qty: 30 CAPSULE | Refills: 1 | Status: SHIPPED | OUTPATIENT
Start: 2024-09-06

## 2024-09-09 ENCOUNTER — MYC MEDICAL ADVICE (OUTPATIENT)
Dept: FAMILY MEDICINE | Facility: CLINIC | Age: 74
End: 2024-09-09
Payer: COMMERCIAL

## 2024-10-07 ENCOUNTER — TELEPHONE (OUTPATIENT)
Dept: FAMILY MEDICINE | Facility: CLINIC | Age: 74
End: 2024-10-07
Payer: COMMERCIAL

## 2024-10-07 ENCOUNTER — PATIENT OUTREACH (OUTPATIENT)
Dept: CARE COORDINATION | Facility: CLINIC | Age: 74
End: 2024-10-07
Payer: COMMERCIAL

## 2024-10-07 NOTE — TELEPHONE ENCOUNTER
FYI - Status Update    Who is Calling: patient    Update: Patient wanted to let the clinic know that he does not want reminder calls for appointment when he already has appointments scheduled. It is not helpful it is disruptive.    Does caller want a call/response back: No

## 2024-10-09 ENCOUNTER — MYC MEDICAL ADVICE (OUTPATIENT)
Dept: FAMILY MEDICINE | Facility: CLINIC | Age: 74
End: 2024-10-09
Payer: COMMERCIAL

## 2024-10-10 SDOH — HEALTH STABILITY: PHYSICAL HEALTH: ON AVERAGE, HOW MANY DAYS PER WEEK DO YOU ENGAGE IN MODERATE TO STRENUOUS EXERCISE (LIKE A BRISK WALK)?: 4 DAYS

## 2024-10-10 SDOH — HEALTH STABILITY: PHYSICAL HEALTH: ON AVERAGE, HOW MANY MINUTES DO YOU ENGAGE IN EXERCISE AT THIS LEVEL?: 50 MIN

## 2024-10-10 ASSESSMENT — SOCIAL DETERMINANTS OF HEALTH (SDOH): HOW OFTEN DO YOU GET TOGETHER WITH FRIENDS OR RELATIVES?: ONCE A WEEK

## 2024-10-14 ENCOUNTER — TELEPHONE (OUTPATIENT)
Dept: FAMILY MEDICINE | Facility: CLINIC | Age: 74
End: 2024-10-14
Payer: COMMERCIAL

## 2024-10-14 NOTE — TELEPHONE ENCOUNTER
Clinic RN: Please investigate patient's chart or contact patient if the information cannot be found because the medication is listed as historical or discontinued. Confirm patient is taking this medication. Document findings and route refill encounter to provider for approval or denial.    Gissel Seay RN  [facility]

## 2024-10-14 NOTE — TELEPHONE ENCOUNTER
Incoming fax requesting trazodone no dose given.    [facility] #[id number] - [address],

## 2024-10-15 ENCOUNTER — OFFICE VISIT (OUTPATIENT)
Dept: FAMILY MEDICINE | Facility: CLINIC | Age: 74
End: 2024-10-15
Payer: COMMERCIAL

## 2024-10-15 VITALS
WEIGHT: 189 LBS | SYSTOLIC BLOOD PRESSURE: 117 MMHG | BODY MASS INDEX: 26.46 KG/M2 | TEMPERATURE: 97.3 F | DIASTOLIC BLOOD PRESSURE: 72 MMHG | HEART RATE: 70 BPM | RESPIRATION RATE: 18 BRPM | OXYGEN SATURATION: 95 % | HEIGHT: 71 IN

## 2024-10-15 DIAGNOSIS — M62.838 NECK MUSCLE SPASM: ICD-10-CM

## 2024-10-15 DIAGNOSIS — Z79.899 MEDICATION MANAGEMENT: ICD-10-CM

## 2024-10-15 DIAGNOSIS — E78.5 HYPERLIPIDEMIA LDL GOAL <160: ICD-10-CM

## 2024-10-15 DIAGNOSIS — G47.00 INSOMNIA, UNSPECIFIED TYPE: ICD-10-CM

## 2024-10-15 DIAGNOSIS — L91.8 SKIN TAG: ICD-10-CM

## 2024-10-15 DIAGNOSIS — M25.559 ISCHIAL PAIN, UNSPECIFIED LATERALITY: ICD-10-CM

## 2024-10-15 DIAGNOSIS — Z00.00 ENCOUNTER FOR MEDICARE ANNUAL WELLNESS EXAM: Primary | ICD-10-CM

## 2024-10-15 LAB
ALBUMIN SERPL BCG-MCNC: 4.2 G/DL (ref 3.5–5.2)
ALP SERPL-CCNC: 73 U/L (ref 40–150)
ALT SERPL W P-5'-P-CCNC: 19 U/L (ref 0–70)
ANION GAP SERPL CALCULATED.3IONS-SCNC: 10 MMOL/L (ref 7–15)
AST SERPL W P-5'-P-CCNC: 27 U/L (ref 0–45)
BILIRUB SERPL-MCNC: 0.3 MG/DL
BUN SERPL-MCNC: 16.6 MG/DL (ref 8–23)
CALCIUM SERPL-MCNC: 9.2 MG/DL (ref 8.8–10.4)
CHLORIDE SERPL-SCNC: 106 MMOL/L (ref 98–107)
CHOLEST SERPL-MCNC: 178 MG/DL
CREAT SERPL-MCNC: 1.01 MG/DL (ref 0.67–1.17)
EGFRCR SERPLBLD CKD-EPI 2021: 78 ML/MIN/1.73M2
ERYTHROCYTE [DISTWIDTH] IN BLOOD BY AUTOMATED COUNT: 11.5 % (ref 10–15)
FASTING STATUS PATIENT QL REPORTED: NO
FASTING STATUS PATIENT QL REPORTED: NO
GLUCOSE SERPL-MCNC: 88 MG/DL (ref 70–99)
HCO3 SERPL-SCNC: 24 MMOL/L (ref 22–29)
HCT VFR BLD AUTO: 43.2 % (ref 40–53)
HDLC SERPL-MCNC: 50 MG/DL
HGB BLD-MCNC: 14.5 G/DL (ref 13.3–17.7)
LDLC SERPL CALC-MCNC: 102 MG/DL
MAGNESIUM SERPL-MCNC: 2.2 MG/DL (ref 1.7–2.3)
MCH RBC QN AUTO: 31.2 PG (ref 26.5–33)
MCHC RBC AUTO-ENTMCNC: 33.6 G/DL (ref 31.5–36.5)
MCV RBC AUTO: 93 FL (ref 78–100)
NONHDLC SERPL-MCNC: 128 MG/DL
PLATELET # BLD AUTO: 219 10E3/UL (ref 150–450)
POTASSIUM SERPL-SCNC: 4.8 MMOL/L (ref 3.4–5.3)
PROT SERPL-MCNC: 7 G/DL (ref 6.4–8.3)
RBC # BLD AUTO: 4.65 10E6/UL (ref 4.4–5.9)
SODIUM SERPL-SCNC: 140 MMOL/L (ref 135–145)
TRIGL SERPL-MCNC: 131 MG/DL
WBC # BLD AUTO: 6.8 10E3/UL (ref 4–11)

## 2024-10-15 PROCEDURE — G0439 PPPS, SUBSEQ VISIT: HCPCS | Performed by: FAMILY MEDICINE

## 2024-10-15 PROCEDURE — 91320 SARSCV2 VAC 30MCG TRS-SUC IM: CPT | Performed by: FAMILY MEDICINE

## 2024-10-15 PROCEDURE — G0008 ADMIN INFLUENZA VIRUS VAC: HCPCS | Performed by: FAMILY MEDICINE

## 2024-10-15 PROCEDURE — 11200 RMVL SKIN TAGS UP TO&INC 15: CPT | Performed by: FAMILY MEDICINE

## 2024-10-15 PROCEDURE — 83735 ASSAY OF MAGNESIUM: CPT | Performed by: FAMILY MEDICINE

## 2024-10-15 PROCEDURE — 80061 LIPID PANEL: CPT | Performed by: FAMILY MEDICINE

## 2024-10-15 PROCEDURE — 36415 COLL VENOUS BLD VENIPUNCTURE: CPT | Performed by: FAMILY MEDICINE

## 2024-10-15 PROCEDURE — 90480 ADMN SARSCOV2 VAC 1/ONLY CMP: CPT | Performed by: FAMILY MEDICINE

## 2024-10-15 PROCEDURE — 99213 OFFICE O/P EST LOW 20 MIN: CPT | Mod: 25 | Performed by: FAMILY MEDICINE

## 2024-10-15 PROCEDURE — 80053 COMPREHEN METABOLIC PANEL: CPT | Performed by: FAMILY MEDICINE

## 2024-10-15 PROCEDURE — 85027 COMPLETE CBC AUTOMATED: CPT | Performed by: FAMILY MEDICINE

## 2024-10-15 PROCEDURE — 90662 IIV NO PRSV INCREASED AG IM: CPT | Performed by: FAMILY MEDICINE

## 2024-10-15 RX ORDER — TRAZODONE HYDROCHLORIDE 50 MG/1
TABLET, FILM COATED ORAL
Qty: 270 TABLET | Refills: 3 | Status: SHIPPED | OUTPATIENT
Start: 2024-10-15

## 2024-10-15 ASSESSMENT — PAIN SCALES - GENERAL: PAINLEVEL: MODERATE PAIN (5)

## 2024-10-15 NOTE — PROGRESS NOTES
Preventive Care Visit  [facility]  Joel Daniel Wegener, MD, Family Medicine  Oct 15, 2024      Assessment & Plan     Encounter for Medicare annual wellness exam    Wellness Visit Notes:      -Colon Cancer Screening: Last done via colonoscopy on 3/2024. (Impression: One 3 mm polyp at the recto-sigmoid colon, removed.). likely no need for further colonoscopies.   -PSA: Last done 3/2024 (Result: 2.13).  discussed/not repeating today.           -Dermatology: Pt verbalized they do not meet with dermatology regularly. No high risk lesions seen on skin exam today.      -Immunizations: Patient is due/able to receive at clinic today: Flu - Today and Covid - Today     Patient concerns  -Trazodone rx  -Temazepam - ?ok to use long term  -Magnesium lab check -- Neck pain, headaches  -Skin tags, irritated  -Scratchy rough throat, ?side effect of Atrovent. Alternating with flonase - ?ok to use long term  -Gluteal pain, sharp pain, constant -- ?related to bike riding  -Itchy eye lids  -Constipation for several months    Insomnia:  discussed risks/benefits of temazepam including falls/death.  Stopping gabapentin to see if improves constipation.  Using trazodone alternating with temazepam for sleep.  Plans to slowly wean with goal of using med for sleep only 3-4 nights/week and dividing between trazodone and temazepam. Follow up six months by e-visit (or virtual or in person) for temazepam    [place] suspicious activity [place] monitoring [place]     Sitz bone pain with e-bike - ice, consider sports med, different bike seat.     Skin tags; 5 each axilla 2mm each removed today with iris scissors and pick-up without significant bleeding    Left neck spasm/ associated headache: discussed strategies for non-weight bearing stretching.  Using magnesium, check level. Consider referral for botox if desired in future.     Itchy eyelids:  without blepharitis or redness.  Recommend cool packs , avoid itching.     Insomnia,  "unspecified type    - traZODone (DESYREL) 50 MG tablet; Take 2-3 tablets at bedtime  - temazepam (RESTORIL) 15 MG capsule; Take 1 capsule (15 mg) by mouth nightly as needed for sleep.    Ischial pain, unspecified laterality      Skin tag      Neck muscle spasm      Hyperlipidemia LDL goal <160    - COMPREHENSIVE METABOLIC PANEL; Future  - Lipid panel reflex to direct LDL Non-fasting; Future  - COMPREHENSIVE METABOLIC PANEL  - Lipid panel reflex to direct LDL Non-fasting  - pravastatin (PRAVACHOL) 40 MG tablet; Take 1 tablet (40 mg) by mouth daily.    Medication management    - CBC with Platelets; Future  - Magnesium; Future  - CBC with Platelets  - Magnesium    Patient has been advised of split billing requirements and indicates understanding: Yes    In addition to preventive care exam and counseling 20 minutes spent on the date of the encounter doing chart review, history and exam, negotiating and explaining the plan with the patient, documentation and further activities as noted above.          BMI  Estimated body mass index is 26.74 kg/m  as calculated from the following:    Height as of this encounter: 1.791 m (5' 10.5\").    Weight as of this encounter: 85.7 kg (189 lb).       Counseling  Appropriate preventive services were addressed with this patient via screening, questionnaire, or discussion as appropriate for fall prevention, nutrition, physical activity, Tobacco-use cessation, social engagement, weight loss and cognition.  Checklist reviewing preventive services available has been given to the patient.  The patient's PHQ-9 score is consistent with mild depression. He was provided with information regarding depression.           Subjective   Abimael is a 73 year old, presenting for the following:  Physical (AWV)        10/15/2024     9:19 AM   Additional Questions   Roomed by Amy HUGHES         Health Care Directive  Patient has a Health Care Directive on file  Advance care planning document is on file but is " outdated.  Patient encouraged to update.    [facility]    Wellness Visit Notes:     -Colon Cancer Screening: Last done via colonoscopy on 3/2024. (Impression: One 3 mm polyp at the recto-sigmoid colon, removed.). Provider to determine if ok to discontinue.  -PSA: Last done 3/2024 (Result: 2.13).      -Dermatology: Pt verbalized they do not meet with dermatology regularly. .    -Immunizations: Patient is due/able to receive at clinic today: Flu - Today and Covid - Today    Patient concerns:  -Trazodone rx  -Temazepam - ?ok to use long term  -Magnesium lab check -- Neck pain, headaches  -Skin tags, irritated  -Scratchy rough throat, ?side effect of Atrovent. Alternating with flonase - ?ok to use long term  -Gluteal pain, sharp pain, constant -- ?related to bike riding  -Itchy eye lids  -Constipation for several months                  10/10/2024   General Health   How would you rate your overall physical health? Excellent   Feel stress (tense, anxious, or unable to sleep) To some extent      (!) STRESS CONCERN      10/10/2024   Nutrition   Diet: Regular (no restrictions)            10/10/2024   Exercise   Days per week of moderate/strenous exercise 4 days   Average minutes spent exercising at this level 50 min            10/10/2024   Social Factors   Frequency of gathering with friends or relatives Once a week   Worry food won't last until get money to buy more No   Food not last or not have enough money for food? No   Do you have housing? (Housing is defined as stable permanent housing and does not include staying ouside in a car, in a tent, in an abandoned building, in an overnight shelter, or couch-surfing.) Yes   Are you worried about losing your housing? No   Lack of transportation? No   Unable to get utilities (heat,electricity)? No            10/15/2024   Fall Risk   Gait Speed Test (Document in seconds) 4   Gait Speed Test Interpretation Less than or equal to 5.00 seconds - PASS             10/10/2024   Activities of  Daily Living- Home Safety   Needs help with the following daily activites None of the above   Safety concerns in the home None of the above            10/10/2024   Dental   Dentist two times every year? Yes            10/10/2024   Hearing Screening   Hearing concerns? (!) I NEED TO ASK PEOPLE TO SPEAK UP OR REPEAT THEMSELVES.    (!) IT'S HARD TO FOLLOW A CONVERSATION IN A NOISY RESTAURANT OR CROWDED ROOM.    (!) TROUBLE FOLLOWING DIALOGUE IN THE THEATHER.       Multiple values from one day are sorted in reverse-chronological order         10/10/2024   Driving Risk Screening   Patient/family members have concerns about driving No            10/10/2024   General Alertness/Fatigue Screening   Have you been more tired than usual lately? (!) YES            10/10/2024   Urinary Incontinence Screening   Bothered by leaking urine in past 6 months No            10/10/2024   TB Screening   Were you born outside of the [place]? No          Today's PHQ-9 Score:       10/14/2024     9:40 AM   PHQ-9 SCORE   PHQ-9 Total Score MyChart 8 (Mild depression)   PHQ-9 Total Score 8         10/10/2024   Substance Use   Alcohol more than 3/day or more than 7/wk Not Applicable   Do you have a current opioid prescription? No   How severe/bad is pain from 1 to 10? 7/10   Do you use any other substances recreationally? No        Social History     Tobacco Use    Smoking status: Former     Current packs/day: 0.00     Average packs/day: 1 pack/day for 41.6 years (41.6 ttl pk-yrs)     Types: Cigarettes     Start date: 10/1/1966     Quit date: 2008     Years since quittin.4    Smokeless tobacco: Never   Vaping Use    Vaping status: Never Used   Substance Use Topics    Alcohol use: Not Currently     Comment: drinks daily approx 3 drinks of vodka, last drink at 2200 yesterday    Drug use: No       ASCVD Risk   The 10-year ASCVD risk score (Heena BOCANEGRA, et al., 2019) is: 18.7%    Values used to calculate the score:      Age: 73 years       Sex: Male      Is Non- : No      Diabetic: No      Tobacco smoker: No      Systolic Blood Pressure: 117 mmHg      Is BP treated: No      HDL Cholesterol: 55 mg/dL      Total Cholesterol: 175 mg/dL            Reviewed and updated as needed this visit by Provider                    Past Medical History:   Diagnosis Date    Anxiety     Arthritis     Deaf, right     4 years old    Depressive disorder     High cholesterol     HTN (hypertension)     LVH (left ventricular hypertrophy)      Past Surgical History:   Procedure Laterality Date    BIOPSY SKIN (LOCATION)      COLONOSCOPY N/A 3/28/2024    Procedure: COLONOSCOPY, WITH POLYPECTOMY;  Surgeon: Hiral Burnette MD;  Location: [facility] OR    EYE SURGERY      Tube from left eye to nose for draining tears.    TONSILLECTOMY       Current providers sharing in care for this patient include:  Patient Care Team:  Wegener, Joel Daniel Irwin, MD as PCP - General (Family Medicine)  Rosetta Martinez MD as MD (Pulmonary Disease)  Goldie Bustamante CNP as Nurse Practitioner (Urology)  Wegener, Joel Daniel Irwin, MD as Assigned PCP  Joaquín Francis MD as MD (Dermapathology)  Alex Sanchez NP as Referring Physician (Nurse Practitioner Primary Care)  Goyo Vance MD as Assigned Surgical Provider    The following health maintenance items are reviewed in Epic and correct as of today:  Health Maintenance   Topic Date Due    BMP  08/18/2024    CMP  08/18/2024    CBC  08/18/2024    LIPID  10/23/2024    DEPRESSION 6 MO INDEX REPEAT PHQ-9  10/29/2024    PSA  03/21/2025    PHQ-9  04/15/2025    MEDICARE ANNUAL WELLNESS VISIT  10/15/2025    ANNUAL REVIEW OF HM ORDERS  10/15/2025    FALL RISK ASSESSMENT  10/15/2025    GLUCOSE  08/18/2026    DTAP/TDAP/TD IMMUNIZATION (5 - Td or Tdap) 07/16/2028    ADVANCE CARE PLANNING  10/15/2029    COLORECTAL CANCER SCREENING  03/28/2034    HEPATITIS C SCREENING  Completed    DEPRESSION ACTION PLAN  Completed     "INFLUENZA VACCINE  Completed    Pneumococcal Vaccine: 65+ Years  Completed    ZOSTER IMMUNIZATION  Completed    HEPATITIS A IMMUNIZATION  Completed    HEPATITIS B IMMUNIZATION  Completed    RSV VACCINE  Completed    AORTIC ANEURYSM SCREENING (SYSTEM ASSIGNED)  Completed    COVID-19 Vaccine  Completed    HPV IMMUNIZATION  Aged Out    MENINGITIS IMMUNIZATION  Aged Out    RSV MONOCLONAL ANTIBODY  Aged Out    LUNG CANCER SCREENING  Discontinued         Review of Systems  Constitutional, neuro, ENT, endocrine, pulmonary, cardiac, gastrointestinal, genitourinary, musculoskeletal, integument and psychiatric systems are negative, except as otherwise noted.     Objective    Exam  /72   Pulse 70   Temp 97.3  F (36.3  C) (Temporal)   Resp 18   Ht 1.791 m (5' 10.5\")   Wt 85.7 kg (189 lb)   SpO2 95%   BMI 26.74 kg/m     Estimated body mass index is 26.74 kg/m  as calculated from the following:    Height as of this encounter: 1.791 m (5' 10.5\").    Weight as of this encounter: 85.7 kg (189 lb).    Physical Exam  GENERAL: alert and no distress  EYES: Eyes grossly normal to inspection, PERRL and conjunctivae and sclerae normal  HENT: ear canals and TM's normal, nose and mouth without ulcers or lesions  NECK: no adenopathy, no asymmetry, masses, or scars  RESP: lungs clear to auscultation - no rales, rhonchi or wheezes  CV: regular rate and rhythm, normal S1 S2, no S3 or S4, no murmur, click or rub, no peripheral edema  ABDOMEN: soft, nontender, no hepatosplenomegaly, no masses and bowel sounds normal  MS: no gross musculoskeletal defects noted, no edema  SKIN: multiple irritated appearing skin tags as listed in plan in axilla  NEURO: Normal strength and tone, mentation intact and speech normal  PSYCH: mentation appears normal, affect normal/bright         10/15/2024   Mini Cog   Clock Draw Score 2 Normal   3 Item Recall 3 objects recalled   Mini Cog Total Score 5                 Signed Electronically by: Amadou Yanes " Wegener, MD    Answers submitted by the patient for this visit:  Patient Health Questionnaire (Submitted on 10/14/2024)  If you checked off any problems, how difficult have these problems made it for you to do your work, take care of things at home, or get along with other people?: Somewhat difficult  PHQ9 TOTAL SCORE: 8

## 2024-10-15 NOTE — PATIENT INSTRUCTIONS
Wellness Visit Notes:      -Colon Cancer Screening: Last done via colonoscopy on 3/2024. (Impression: One 3 mm polyp at the recto-sigmoid colon, removed.). likely no need for further colonoscopies.   -PSA: Last done 3/2024 (Result: 2.13).  discussed/not repeating today.           -Dermatology: Pt verbalized they do not meet with dermatology regularly. No high risk lesions seen on skin exam today.      -Immunizations: Patient is due/able to receive at clinic today: Flu - Today and Covid - Today     Patient concerns:  -Trazodone rx  -Temazepam - ?ok to use long term  -Magnesium lab check -- Neck pain, headaches  -Skin tags, irritated  -Scratchy rough throat, ?side effect of Atrovent. Alternating with flonase - ?ok to use long term  -Gluteal pain, sharp pain, constant -- ?related to bike riding  -Itchy eye lids  -Constipation for several months    Insomnia:  discussed risks/benefits of temazepam including falls/death.  Stopping gabapentin to see if improves constipation.  Using trazodone alternating with temazepam for sleep.  Plans to slowly wean with goal of using med for sleep only 3-4 nights/week and dividing between trazodone and temazepam. Follow up six months by e-visit (or virtual or in person) for temazepam    Sitz bone pain with e-bike - ice, consider sports med, different bike seat.     Skin tags; 5 each axilla 2mm each removed today with iris scissors and pick-up without significant bleeding    Left neck spasm/ associated headache: discussed strategies for non-weight bearing stretching.  Using magnesium, check level. Consider referral for botox if desired in future.     Itchy eyelids:  without blepharitis or redness.  Recommend cool packs , avoid itching.     Patient Education   Preventive Care Advice   This is general advice given by our system to help you stay healthy. However, your care team may have specific advice just for you. Please talk to your care team about your preventive care  needs.  Nutrition  Eat 5 or more servings of fruits and vegetables each day.  Try wheat bread, brown rice and whole grain pasta (instead of white bread, rice, and pasta).  Get enough calcium and vitamin D. Check the label on foods and aim for 100% of the RDA (recommended daily allowance).  Lifestyle  Exercise at least 150 minutes each week  (30 minutes a day, 5 days a week).  Do muscle strengthening activities 2 days a week. These help control your weight and prevent disease.  No smoking.  Wear sunscreen to prevent skin cancer.  Have a dental exam and cleaning every 6 months.  Yearly exams  See your health care team every year to talk about:  Any changes in your health.  Any medicines your care team has prescribed.  Preventive care, family planning, and ways to prevent chronic diseases.  Shots (vaccines)   HPV shots (up to age 26), if you've never had them before.  Hepatitis B shots (up to age 59), if you've never had them before.  COVID-19 shot: Get this shot when it's due.  Flu shot: Get a flu shot every year.  Tetanus shot: Get a tetanus shot every 10 years.  Pneumococcal, hepatitis A, and RSV shots: Ask your care team if you need these based on your risk.  Shingles shot (for age 50 and up)  General health tests  Diabetes screening:  Starting at age 35, Get screened for diabetes at least every 3 years.  If you are younger than age 35, ask your care team if you should be screened for diabetes.  Cholesterol test: At age 39, start having a cholesterol test every 5 years, or more often if advised.  Bone density scan (DEXA): At age 50, ask your care team if you should have this scan for osteoporosis (brittle bones).  Hepatitis C: Get tested at least once in your life.  STIs (sexually transmitted infections)  Before age 24: Ask your care team if you should be screened for STIs.  After age 24: Get screened for STIs if you're at risk. You are at risk for STIs (including HIV) if:  You are sexually active with more than  one person.  You don't use condoms every time.  You or a partner was diagnosed with a sexually transmitted infection.  If you are at risk for HIV, ask about PrEP medicine to prevent HIV.  Get tested for HIV at least once in your life, whether you are at risk for HIV or not.  Cancer screening tests  Cervical cancer screening: If you have a cervix, begin getting regular cervical cancer screening tests starting at age 21.  Breast cancer scan (mammogram): If you've ever had breasts, begin having regular mammograms starting at age 40. This is a scan to check for breast cancer.  Colon cancer screening: It is important to start screening for colon cancer at age 45.  Have a colonoscopy test every 10 years (or more often if you're at risk) Or, ask your provider about stool tests like a FIT test every year or Cologuard test every 3 years.  To learn more about your testing options, visit:   .  For help making a decision, visit:   https://bit.ly/ql46990.  Prostate cancer screening test: If you have a prostate, ask your care team if a prostate cancer screening test (PSA) at age 55 is right for you.  Lung cancer screening: If you are a current or former smoker ages 50 to 80, ask your care team if ongoing lung cancer screenings are right for you.  For informational purposes only. Not to replace the advice of your health care provider. Copyright   2023 [facility]. All rights reserved. Clinically reviewed by the [facility]. Radionomy 086117 - REV 01/24.  Preventing Falls: Care Instructions  Injuries and health problems such as trouble walking or poor eyesight can increase your risk of falling. So can some medicines. But there are things you can do to help prevent falls. You can exercise to get stronger. You can also arrange your home to make it safer.    Talk to your doctor about the medicines you take. Ask if any of them increase the risk of falls and whether they can be changed or stopped.  "  Try to exercise regularly. It can help improve your strength and balance. This can help lower your risk of falling.         Practice fall safety and prevention.   Wear low-heeled shoes that fit well and give your feet good support. Talk to your doctor if you have foot problems that make this hard.  Carry a cellphone or wear a medical alert device that you can use to call for help.  Use stepladders instead of chairs to reach high objects. Don't climb if you're at risk for falls. Ask for help, if needed.  Wear the correct eyeglasses, if you need them.        Make your home safer.   Remove rugs, cords, clutter, and furniture from walkways.  Keep your house well lit. Use night-lights in hallways and bathrooms.  Install and use sturdy handrails on stairways.  Wear nonskid footwear, even inside. Don't walk barefoot or in socks without shoes.        Be safe outside.   Use handrails, curb cuts, and ramps whenever possible.  Keep your hands free by using a shoulder bag or backpack.  Try to walk in well-lit areas. Watch out for uneven ground, changes in pavement, and debris.  Be careful in the winter. Walk on the grass or gravel when sidewalks are slippery. Use de-icer on steps and walkways. Add non-slip devices to shoes.    Put grab bars and nonskid mats in your shower or tub and near the toilet. Try to use a shower chair or bath bench when bathing.   Get into a tub or shower by putting in your weaker leg first. Get out with your strong side first. Have a phone or medical alert device in the bathroom with you.   Where can you learn more?  Go to https://www.yavalu.net/patiented  Enter G117 in the search box to learn more about \"Preventing Falls: Care Instructions.\"  Current as of: July 17, 2023  Content Version: 14.2 2024 [employer].   Care instructions adapted under license by your healthcare professional. If you have questions about a medical condition or this instruction, always ask your healthcare " professional. [employer], [facility] disclaims any warranty or liability for your use of this information.    Hearing Loss: Care Instructions  Overview     Hearing loss is a sudden or slow decrease in how well you hear. It can range from slight to profound. Permanent hearing loss can occur with aging. It also can happen when you are exposed long-term to loud noise. Examples include listening to loud music, riding motorcycles, or being around other loud machines.  Hearing loss can affect your work and home life. It can make you feel lonely or depressed. You may feel that you have lost your independence. But hearing aids and other devices can help you hear better and feel connected to others.  Follow-up care is a key part of your treatment and safety. Be sure to make and go to all appointments, and call your doctor if you are having problems. It's also a good idea to know your test results and keep a list of the medicines you take.  How can you care for yourself at home?  Avoid loud noises whenever possible. This helps keep your hearing from getting worse.  Always wear hearing protection around loud noises.  Wear a hearing aid as directed.  A professional can help you pick a hearing aid that will work best for you.  You can also get hearing aids over the counter for mild to moderate hearing loss.  Have hearing tests as your doctor suggests. They can show whether your hearing has changed. Your hearing aid may need to be adjusted.  Use other devices as needed. These may include:  Telephone amplifiers and hearing aids that can connect to a television, stereo, radio, or microphone.  Devices that use lights or vibrations. These alert you to the doorbell, a ringing telephone, or a baby monitor.  Television closed-captioning. This shows the words at the bottom of the screen. Most new TVs can do this.  TTY (text telephone). This lets you type messages back and forth on the telephone instead of talking or listening. These  "devices are also called TDD. When messages are typed on the keyboard, they are sent over the phone line to a receiving TTY. The message is shown on a monitor.  Use text messaging, social media, and email if it is hard for you to communicate by telephone.  Try to learn a listening technique called speechreading. It is not lipreading. You pay attention to people's gestures, expressions, posture, and tone of voice. These clues can help you understand what a person is saying. Face the person you are talking to, and have them face you. Make sure the lighting is good. You need to see the other person's face clearly.  Think about counseling if you need help to adjust to your hearing loss.  When should you call for help?  Watch closely for changes in your health, and be sure to contact your doctor if:    You think your hearing is getting worse.     You have new symptoms, such as dizziness or nausea.   Where can you learn more?  Go to https://www.Cloud[employer].net/patiented  Enter R798 in the search box to learn more about \"Hearing Loss: Care Instructions.\"  Current as of: September 27, 2023  Content Version: 14.2 2024 [employer].   Care instructions adapted under license by your healthcare professional. If you have questions about a medical condition or this instruction, always ask your healthcare professional. [employer] disclaims any warranty or liability for your use of this information.    Learning About Sleeping Well  What does sleeping well mean?     Sleeping well means getting enough sleep to feel good and stay healthy. How much sleep is enough varies among people.  The number of hours you sleep and how you feel when you wake up are both important. If you do not feel refreshed, you probably need more sleep. Another sign of not getting enough sleep is feeling tired during the day.  Experts recommend that adults get at least 7 or more hours of sleep per day. Children and older adults need more " "sleep.  Why is getting enough sleep important?  Getting enough quality sleep is a basic part of good health. When your sleep suffers, your physical health, mood, and your thoughts can suffer too. You may find yourself feeling more grumpy or stressed. Not getting enough sleep also can lead to serious problems, including injury, accidents, anxiety, and depression.  What might cause poor sleeping?  Many things can cause sleep problems, including:  Changes to your sleep schedule.  Stress. Stress can be caused by fear about a single event, such as giving a speech. Or you may have ongoing stress, such as worry about work or school.  Depression, anxiety, and other mental or emotional conditions.  Changes in your sleep habits or surroundings. This includes changes that happen where you sleep, such as noise, light, or sleeping in a different bed. It also includes changes in your sleep pattern, such as having jet lag or working a late shift.  Health problems, such as pain, breathing problems, and restless legs syndrome.  Lack of regular exercise.  Using alcohol, nicotine, or caffeine before bed.  How can you help yourself?  Here are some tips that may help you sleep more soundly and wake up feeling more refreshed.  Your sleeping area   Use your bedroom only for sleeping and sex. A bit of light reading may help you fall asleep. But if it doesn't, do your reading elsewhere in the house. Try not to use your TV, computer, smartphone, or tablet while you are in bed.  Be sure your bed is big enough to stretch out comfortably, especially if you have a sleep partner.  Keep your bedroom quiet, dark, and cool. Use curtains, blinds, or a sleep mask to block out light. To block out noise, use earplugs, soothing music, or a \"white noise\" machine.  Your evening and bedtime routine   Create a relaxing bedtime routine. You might want to take a warm shower or bath, or listen to soothing music.  Go to bed at the same time every night. And get " "up at the same time every morning, even if you feel tired.  What to avoid   Limit caffeine (coffee, tea, caffeinated sodas) during the day, and don't have any for at least 6 hours before bedtime.  Avoid drinking alcohol before bedtime. Alcohol can cause you to wake up more often during the night.  Try not to smoke or use tobacco, especially in the evening. Nicotine can keep you awake.  Limit naps during the day, especially close to bedtime.  Avoid lying in bed awake for too long. If you can't fall asleep or if you wake up in the middle of the night and can't get back to sleep within about 20 minutes, get out of bed and go to another room until you feel sleepy.  Avoid taking medicine right before bed that may keep you awake or make you feel hyper or energized. Your doctor can tell you if your medicine may do this and if you can take it earlier in the day.  If you can't sleep   Imagine yourself in a peaceful, pleasant scene. Focus on the details and feelings of being in a place that is relaxing.  Get up and do a quiet or boring activity until you feel sleepy.  Avoid drinking any liquids before going to bed to help prevent waking up often to use the bathroom.  Where can you learn more?  Go to https://www.[employer]net/patiented  Enter J942 in the search box to learn more about \"Learning About Sleeping Well.\"  Current as of: July 10, 2023  Content Version: 14.2 2024 [facility].   Care instructions adapted under license by your healthcare professional. If you have questions about a medical condition or this instruction, always ask your healthcare professional. Healthwise, Incorporated disclaims any warranty or liability for your use of this information.    Learning About Depression Screening  What is depression screening?  Depression screening is a way to see if you have depression symptoms. It may be done by a doctor or counselor. It's often part of a routine checkup. That's because your mental health is " "just as important as your physical health.  Depression is a mental health condition that affects how you feel, think, and act. You may:  Have less energy.  Lose interest in your daily activities.  Feel sad and grouchy for a long time.  Depression is very common. It affects people of all ages.  Many things can lead to depression. Some people become depressed after they have a stroke or find out they have a major illness like cancer or heart disease. The death of a loved one or a breakup may lead to depression. It can run in families. Most experts believe that a combination of inherited genes and stressful life events can cause it.  What happens during screening?  You may be asked to fill out a form about your depression symptoms. You and the doctor will discuss your answers. The doctor may ask you more questions to learn more about how you think, act, and feel.  What happens after screening?  If you have symptoms of depression, your doctor will talk to you about your options.  Doctors usually treat depression with medicines or counseling. Often, combining the two works best. Many people don't get help because they think that they'll get over the depression on their own. But people with depression may not get better unless they get treatment.  The cause of depression is not well understood. There may be many factors involved. But if you have depression, it's not your fault.  A serious symptom of depression is thinking about death or suicide. If you or someone you care about talks about this or about feeling hopeless, get help right away.  It's important to know that depression can be treated. Medicine, counseling, and self-care may help.  Where can you learn more?  Go to https://www.[employer].net/patiented  Enter T185 in the search box to learn more about \"Learning About Depression Screening.\"  Current as of: June 24, 2023  Content Version: 14.2 2024 [facility] [employer].   Care instructions adapted under " license by your healthcare professional. If you have questions about a medical condition or this instruction, always ask your healthcare professional. Healthwise, Incorporated disclaims any warranty or liability for your use of this information.

## 2024-10-18 RX ORDER — TEMAZEPAM 15 MG/1
15 CAPSULE ORAL
Qty: 30 CAPSULE | Refills: 1 | Status: SHIPPED | OUTPATIENT
Start: 2024-10-18

## 2024-10-18 RX ORDER — PRAVASTATIN SODIUM 40 MG
40 TABLET ORAL DAILY
Qty: 90 TABLET | Refills: 3 | Status: SHIPPED | OUTPATIENT
Start: 2024-10-18

## 2024-10-21 ENCOUNTER — TELEPHONE (OUTPATIENT)
Dept: FAMILY MEDICINE | Facility: CLINIC | Age: 74
End: 2024-10-21
Payer: COMMERCIAL

## 2024-10-21 NOTE — TELEPHONE ENCOUNTER
Patient calling in stating he was in to see Dr. Wegener who gave a print out of his med list. Patient is calling back requesting the list be updated:    Temazepam 15 mg capsule take 1 capsule by mouth nightly as needed for sleep --> patient stated he is taking 1 capsule every other night alternating with traZODone 100mg the nights he is not taking temazepam.     Patient also stated he has been taking Flonase in the morning and ipratropium 1-2 everyday as needed as the patient stated the ipratropium was becoming too much for him to take.    Ok to call patient with any questions.    Nasrin   Lead

## 2024-10-22 RX ORDER — FLUTICASONE PROPIONATE 50 MCG
1 SPRAY, SUSPENSION (ML) NASAL DAILY
COMMUNITY

## 2024-11-30 DIAGNOSIS — F34.1 DYSTHYMIA: ICD-10-CM

## 2024-12-03 ENCOUNTER — MYC MEDICAL ADVICE (OUTPATIENT)
Dept: FAMILY MEDICINE | Facility: CLINIC | Age: 74
End: 2024-12-03
Payer: COMMERCIAL

## 2024-12-03 DIAGNOSIS — N52.9 ERECTILE DYSFUNCTION, UNSPECIFIED ERECTILE DYSFUNCTION TYPE: ICD-10-CM

## 2024-12-03 RX ORDER — CITALOPRAM HYDROBROMIDE 40 MG/1
40 TABLET ORAL DAILY
Qty: 90 TABLET | Refills: 1 | Status: SHIPPED | OUTPATIENT
Start: 2024-12-03

## 2024-12-03 NOTE — TELEPHONE ENCOUNTER
Dr. Wegener,  Please see [email or web address] message and advise. You last saw pt in office on 10/15/24.     Thank you,  Rosa GUZMÁN RN.

## 2024-12-03 NOTE — TELEPHONE ENCOUNTER
Could he see me 9:00 on the 9th?  (8:40 arrival)? Ok to double book.  I would be a good person to start with to eval the jaw pain and we can make a further plan for the bottom pain.     Joel Wegener,MD w

## 2024-12-04 RX ORDER — SILDENAFIL 100 MG/1
50-100 TABLET, FILM COATED ORAL DAILY PRN
Qty: 30 TABLET | Refills: 2 | Status: SHIPPED | OUTPATIENT
Start: 2024-12-04

## 2024-12-09 ENCOUNTER — ANCILLARY PROCEDURE (OUTPATIENT)
Dept: GENERAL RADIOLOGY | Facility: CLINIC | Age: 74
End: 2024-12-09
Attending: FAMILY MEDICINE
Payer: COMMERCIAL

## 2024-12-09 ENCOUNTER — OFFICE VISIT (OUTPATIENT)
Dept: FAMILY MEDICINE | Facility: CLINIC | Age: 74
End: 2024-12-09
Payer: COMMERCIAL

## 2024-12-09 VITALS
RESPIRATION RATE: 16 BRPM | BODY MASS INDEX: 26.21 KG/M2 | HEART RATE: 86 BPM | SYSTOLIC BLOOD PRESSURE: 112 MMHG | TEMPERATURE: 97.8 F | WEIGHT: 185.3 LBS | DIASTOLIC BLOOD PRESSURE: 67 MMHG | OXYGEN SATURATION: 96 %

## 2024-12-09 DIAGNOSIS — M79.18 RIGHT BUTTOCK PAIN: ICD-10-CM

## 2024-12-09 DIAGNOSIS — M54.50 CHRONIC BILATERAL LOW BACK PAIN WITHOUT SCIATICA: ICD-10-CM

## 2024-12-09 DIAGNOSIS — M26.609 TMJ (TEMPOROMANDIBULAR JOINT SYNDROME): ICD-10-CM

## 2024-12-09 DIAGNOSIS — G89.29 CHRONIC BILATERAL LOW BACK PAIN WITHOUT SCIATICA: ICD-10-CM

## 2024-12-09 DIAGNOSIS — R68.84 JAW PAIN: Primary | ICD-10-CM

## 2024-12-09 DIAGNOSIS — F34.1 DYSTHYMIA: ICD-10-CM

## 2024-12-09 DIAGNOSIS — F33.1 MODERATE EPISODE OF RECURRENT MAJOR DEPRESSIVE DISORDER (H): ICD-10-CM

## 2024-12-09 DIAGNOSIS — R68.84 JAW PAIN: ICD-10-CM

## 2024-12-09 DIAGNOSIS — G44.209 TENSION HEADACHE: ICD-10-CM

## 2024-12-09 PROCEDURE — G2211 COMPLEX E/M VISIT ADD ON: HCPCS | Performed by: FAMILY MEDICINE

## 2024-12-09 PROCEDURE — 73502 X-RAY EXAM HIP UNI 2-3 VIEWS: CPT | Mod: TC | Performed by: RADIOLOGY

## 2024-12-09 PROCEDURE — 70110 X-RAY EXAM OF JAW 4/> VIEWS: CPT | Mod: TC | Performed by: PREVENTIVE MEDICINE

## 2024-12-09 PROCEDURE — 99214 OFFICE O/P EST MOD 30 MIN: CPT | Performed by: FAMILY MEDICINE

## 2024-12-09 PROCEDURE — 72100 X-RAY EXAM L-S SPINE 2/3 VWS: CPT | Mod: TC | Performed by: PREVENTIVE MEDICINE

## 2024-12-09 ASSESSMENT — PATIENT HEALTH QUESTIONNAIRE - PHQ9
10. IF YOU CHECKED OFF ANY PROBLEMS, HOW DIFFICULT HAVE THESE PROBLEMS MADE IT FOR YOU TO DO YOUR WORK, TAKE CARE OF THINGS AT HOME, OR GET ALONG WITH OTHER PEOPLE: SOMEWHAT DIFFICULT
SUM OF ALL RESPONSES TO PHQ QUESTIONS 1-9: 9
SUM OF ALL RESPONSES TO PHQ QUESTIONS 1-9: 9

## 2024-12-09 ASSESSMENT — PAIN SCALES - GENERAL: PAINLEVEL_OUTOF10: MILD PAIN (3)

## 2024-12-09 NOTE — PROGRESS NOTES
Assessment & Plan     Jaw pain  Worse with hard to chew foods.  Shifts to right with opening/closing with some crepitus.  Slight linea alba in mouth buccal mucosa.  Overall consistent with TMJ.  Disucssed initial treatments including icing, nsaids, rest (soft foods/avoid gum), icing.  Recommend seeing dentist to eval for jaw clenching/consider .     Xray today to exclude mass/arthritis as underlying cause.   - XR Mandible G/E 4 Views; Future  - Physical Therapy  Referral; Future    TMJ (temporomandibular joint syndrome)  As above.   - XR Mandible G/E 4 Views; Future  - Physical Therapy  Referral; Future    Chronic bilateral low back pain without sciatica  Intermittent/comes and goes.  Desires referral to [facility] where he had trigger point release therapy in past which was very helpful for this as well as headache.  Interested in referral for the buttocks pain as well.     Xray to evaluate for level of arthritis which will help with prognosis.   - XR Lumbar Spine 2/3 Views; Future  - Physical Therapy  Referral; Future    Tension headache  As above.   - Physical Therapy  Referral; Future    Right buttock pain  In ischial area.  Again discussed off-loading and icing (has been heating).   Xrays to evaluate for arthritis, tumor as underlying cause.     - XR Pelvis and Hip Right 1 View; Future  - Physical Therapy  Referral; Future    Dysthymia  and    Moderate episode of recurrent major depressive disorder (H)  Epic reminder of diagnosis.  Confirmed that recently had citalopram updated/renewed.         Follow up as needed.       35 minutes spent on the date of the encounter doing chart review, history and exam, negotiating and explaining the plan with the patient, documentation and further activities as noted above.      The longitudinal plan of care for the diagnosis(es)/condition(s) as documented were addressed during this visit. Due to the  "added complexity in care, I will continue to support Abimael in the subsequent management and with ongoing continuity of care.          Subjective   Abimael is a 74 year old, presenting for the following health issues:  Jaw Pain, Musculoskeletal Problem, and Headache        12/9/2024     8:49 AM   Additional Questions   Roomed by Nany MOSELEY   Accompanied by n/a     History of Present Illness       Back Pain:  He presents for follow up of back pain. Patient's back pain is a recurring problem.  Location of back pain:  Right lower back, left lower back, right middle of back, left middle of back and right buttock  Description of back pain: sharp  Back pain spreads: right buttocks and left side of neck    Since patient first noticed back pain, pain is: always present, but gets better and worse  Does back pain interfere with his job:  Not applicable       Headaches:   Since the patient's last clinic visit, headaches are: worsened  The patient is getting headaches:  3x per week  He is able to do normal daily activities when he has a migraine.  The patient is taking the following rescue/relief medications:  Ibuprofen (Advil, Motrin)   Patient states \"I get some relief\" from the rescue/relief medications.   The patient is taking the following medications to prevent migraines:  No medications to prevent migraines  In the past 4 weeks, the patient has gone to an Urgent Care or Emergency Room 0 times times due to headaches.    Reason for visit:  Rt jaw pain. Rt buttocks pain. Back pain. Neck migraine question.  Symptom onset:  More than a month  Symptoms include:  Sharp  Symptom intensity:  Severe  Symptom progression:  Staying the same  Had these symptoms before:  No  What makes it worse:  Chewing  What makes it better:  No   He is taking medications regularly.                     Objective    /67 (BP Location: Right arm, Patient Position: Sitting, Cuff Size: Adult Regular)   Pulse 86   Temp 97.8  F (36.6  C) (Temporal)   Resp " 16   Wt 84.1 kg (185 lb 4.8 oz)   SpO2 96%   BMI 26.21 kg/m    Body mass index is 26.21 kg/m .  Physical Exam   As above.             Signed Electronically by: Joel Daniel Wegener, MD

## 2024-12-16 ENCOUNTER — TELEPHONE (OUTPATIENT)
Dept: FAMILY MEDICINE | Facility: CLINIC | Age: 74
End: 2024-12-16
Payer: COMMERCIAL

## 2024-12-16 NOTE — TELEPHONE ENCOUNTER
Patient requesting PT referral be sent to the following:   [facility]    Appears referral signed 12/9/24  Faxed now    Thanks,  Alysha THORPE RN

## 2024-12-16 NOTE — TELEPHONE ENCOUNTER
Order/Referral Request    Who is requesting: patient and Goldie and [facility]    Orders being requested:  referral for:   Jaw pain  TmJ  right buttock pain  chronic bilateral low back pain without sciatica  tension headache    Reason service is needed/diagnosis: Same as above    When are orders needed by:  ASAP    Has this been discussed with Provider: Yes    Does patient have a preference on a Group/Provider/Facility?   [facility] in [place].    Does patient have an appointment scheduled?: No    Where to send orders: Fax ATTN: Goldie at [facility] ( [place])   [phone]    Could we send this information to you in [facility] or would you prefer to receive a phone call?:   Patient would prefer a phone call   Okay to leave a detailed message?: Yes at Cell number on file:    Telephone Information:   Mobile [phone]

## 2024-12-23 ENCOUNTER — NURSE TRIAGE (OUTPATIENT)
Dept: FAMILY MEDICINE | Facility: CLINIC | Age: 74
End: 2024-12-23

## 2024-12-23 ENCOUNTER — VIRTUAL VISIT (OUTPATIENT)
Dept: FAMILY MEDICINE | Facility: CLINIC | Age: 74
End: 2024-12-23
Payer: COMMERCIAL

## 2024-12-23 ENCOUNTER — E-VISIT (OUTPATIENT)
Dept: URGENT CARE | Facility: CLINIC | Age: 74
End: 2024-12-23
Payer: COMMERCIAL

## 2024-12-23 DIAGNOSIS — U07.1 INFECTION DUE TO COVID-19 VIRUS VARIANT OF CONCERN: Primary | ICD-10-CM

## 2024-12-23 DIAGNOSIS — U07.1 INFECTION DUE TO 2019 NOVEL CORONAVIRUS: Primary | ICD-10-CM

## 2024-12-23 PROCEDURE — 99214 OFFICE O/P EST MOD 30 MIN: CPT | Mod: 95 | Performed by: FAMILY MEDICINE

## 2024-12-23 PROCEDURE — 99207 PR NON-BILLABLE SERV PER CHARTING: CPT | Performed by: NURSE PRACTITIONER

## 2024-12-23 ASSESSMENT — PATIENT HEALTH QUESTIONNAIRE - PHQ9
SUM OF ALL RESPONSES TO PHQ QUESTIONS 1-9: 8
SUM OF ALL RESPONSES TO PHQ QUESTIONS 1-9: 8
10. IF YOU CHECKED OFF ANY PROBLEMS, HOW DIFFICULT HAVE THESE PROBLEMS MADE IT FOR YOU TO DO YOUR WORK, TAKE CARE OF THINGS AT HOME, OR GET ALONG WITH OTHER PEOPLE: SOMEWHAT DIFFICULT

## 2024-12-23 NOTE — TELEPHONE ENCOUNTER
Called and spoke with patient   Scheduled for VV as provider at clinic opened up a few spots this afternoon    Anuradha PALMER RN

## 2024-12-23 NOTE — TELEPHONE ENCOUNTER
RN COVID TREATMENT VISIT  12/23/24      The patient has been triaged and does not require a higher level of care.    Ezio Bassett  74 year old  Current weight? 180    Has the patient been seen by a primary care or specialty provider at a [facility] within the past three years? Yes.   Have you been in close proximity to/do you have a known exposure to a person with a confirmed case of influenza? No.     General treatment eligibility:  Date of positive COVID test (PCR or at home)?  12/22    Are you or have you been hospitalized for this COVID-19 infection? No.   Have you received monoclonal antibodies or antiviral treatment for COVID-19 since this positive test? No.   Do you have any of the following conditions that place you at risk of being very sick from COVID-19?   - Age 50 or older  - Mood disorders, including depression and schizophrenia spectrum disorders   Yes, patient has at least one high risk condition as noted above.     Current COVID symptoms:   - fever or chills  - cough  - fatigue  - muscle or body aches  - headache  - sore throat  - congestion or runny nose  Yes. Patient has at least one symptom as selected.     How many days since symptoms started? 5 days or less. Established patient, 12 years or older weighing at least 88.2 lbs, who has symptoms that started in the past 5 days, has not been hospitalized nor received treatment already, and is at risk for being very sick from COVID-19.     Treatment eligibility by RN:  Are you currently pregnant or nursing? No  Do you have a clinically significant hypersensitivity to nirmatrelvir or ritonavir, or toxic epidermal necrolysis (TEN) or Cartagena-Baldemar Syndrome? No  Do you have a history of hepatitis, any hepatic impairment on the Problem List (such as Child-Arroyo Class C, cirrhosis, fatty liver disease, alcoholic liver disease), or was the last liver lab (hepatic panel, ALT, AST, ALK Phos, bilirubin) elevated in the past 6  months? No  Do you have any history of severe renal impairment (eGFR < 30mL/min)? No    Is patient eligible to continue? Yes, patient meets all eligibility requirements for the RN COVID treatment (as denoted by all no responses above).     Current Outpatient Medications   Medication Sig Dispense Refill    citalopram (CELEXA) 40 MG tablet TAKE 1 TABLET BY MOUTH EVERY DAY 90 tablet 1    fluticasone (FLONASE) 50 MCG/ACT nasal spray Spray 1 spray into both nostrils daily.      ibuprofen (ADVIL/MOTRIN) 800 MG tablet TAKE 1 TABLET BY MOUTH UP TO 3 TIMES A DAY AS NEEDED FOR PAIN 90 tablet 3    ipratropium (ATROVENT) 0.06 % nasal spray Spray 2 sprays into both nostrils 3 times daily      ketoconazole (NIZORAL) 2 % external cream Apply topically daily (Patient taking differently: Apply topically daily as needed for irritation. As needed) 30 g 3    melatonin 5 MG tablet Take 5 mg by mouth nightly as needed for sleep      Multiple Vitamins-Minerals (MULTIVITAMIN ADULT PO) Take 1 tablet by mouth      pravastatin (PRAVACHOL) 40 MG tablet Take 1 tablet (40 mg) by mouth daily. 90 tablet 3    sildenafil (VIAGRA) 100 MG tablet TAKE 0.5-1 TABLETS ( MG) BY MOUTH DAILY AS NEEDED 30 tablet 2    tamsulosin (FLOMAX) 0.4 MG capsule TAKE 1 CAPSULE BY MOUTH EVERY DAY 90 capsule 3    temazepam (RESTORIL) 15 MG capsule Take 1 capsule (15 mg) by mouth nightly as needed for sleep. 30 capsule 1    traZODone (DESYREL) 50 MG tablet Take 2-3 tablets at bedtime 270 tablet 3       Medications from List 1 of the standing order (on medications that exclude the use of Paxlovid) that patient is taking: NONE.   Is patient taking any meds from List 1? No.   Medications from List 2 of the standing order (on meds that provider needs to adjust) that patient is taking:  Tamazepam- per up to date medication interaction  Is patient on any of the meds from List 2? Yes. Patient will be scheduled or transferred to a  at the end of this call.   Patient  opted to complete e-visit/schedule virtual visit via [email or web address]  Anuradha Giraldo RN          Reason for Disposition   HIGH RISK patient (e.g., weak immune system, age > 64 years, obesity with BMI of 30 or higher, pregnant, chronic lung disease) and COVID symptoms (e.g., cough, fever) (Exceptions: Already seen by doctor or NP/PA and no new or worsening symptoms.)    Additional Information   Negative: SEVERE difficulty breathing (e.g., struggling for each breath, speaks in single words)   Negative: Difficult to awaken or acting confused (e.g., disoriented, slurred speech)   Negative: Bluish (or gray) lips or face now   Negative: Shock suspected (e.g., cold/pale/clammy skin, too weak to stand, low BP, rapid pulse)   Negative: Sounds like a life-threatening emergency to the triager   Negative: SEVERE or constant chest pain or pressure  (Exception: Mild central chest pain, present only when coughing.)   Negative: Diagnosed or suspected COVID-19 and symptoms lasting 3 or more weeks   Negative: COVID-19 exposure and no symptoms   Negative: COVID-19 vaccine reaction suspected (e.g., fever, headache, muscle aches) occurring 1 to 3 days after getting vaccine   Negative: COVID-19 vaccine, questions about   Negative: Exposure to someone known to have influenza (flu test positive) and flu-like symptoms (e.g., cough, runny nose, sore throat, SOB; with or without fever)   Negative: Possible COVID-19 symptoms and triager concerned about severity of symptoms or other causes   Negative: COVID-19 and breastfeeding, questions about   Negative: MODERATE difficulty breathing (e.g., speaks in phrases, SOB even at rest, pulse 100-120)   Negative: Headache and stiff neck (can't touch chin to chest)   Negative: Oxygen level (e.g., pulse oximetry) 90% or lower   Negative: Chest pain or pressure  (Exception: MILD central chest pain, present only when coughing.)   Negative: Drinking very little and dehydration suspected (e.g., no urine > 12 hours,  very dry mouth, very lightheaded)   Negative: Patient sounds very sick or weak to the triager   Negative: Fever > 101 F (38.3 C) and over 60 years of age   Negative: Fever > 100 F (37.8 C) and bedridden (e.g., CVA, chronic illness, recovering from surgery)   Negative: Fever > 103 F (39.4 C)   Negative: MILD difficulty breathing (e.g., minimal/no SOB at rest, SOB with walking, pulse <100)    Protocols used: COVID-19 - Diagnosed or [id number]-BS

## 2024-12-23 NOTE — TELEPHONE ENCOUNTER
Triage,  Patient called.   States he was recommended to schedule a virtual visit per evisit instructions.   He tried to schedule an appointment through central scheduling but was told the soonest available appt for any provider in the system was in January.   Patient needs a virtual visit today for paxlovid.   Please advise.   Thanks!  Alicia BHAKTA

## 2024-12-23 NOTE — PROGRESS NOTES
Abimael is a 74 year old who is being evaluated via a billable video visit.    How would you like to obtain your AVS? MyChart  If the video visit is dropped, the invitation should be resent by: Text to cell phone: [phone]  Will anyone else be joining your video visit? No      Assessment & Plan     Infection due to 2019 novel coronavirus  With moderate symptoms but no significant respiratory distress or wheezing.  Oxygen 94% this am.     Reviewed drug interactions in three databases ([employer], [employer], up to date).     Do not take sildenafil or trazodone while taking paxlovid and for three days after.      Specifically checked temazepam in all three databases and ok to continue to take this.     If severe signs /symptoms develop seek medical care.     10 day quarantine recommended with masking if in public after 5 days.     Follow up as needed.   - nirmatrelvir and ritonavir (PAXLOVID) 300 mg/100 mg therapy pack; Take 3 tablets by mouth 2 times daily for 5 days. (Take 2 Nirmatrelvir tablets and 1 Ritonavir tablet twice daily for 5 days)                Subjective   Abimael is a 74 year old, presenting for the following health issues:  No chief complaint on file.        12/23/2024    11:28 AM   Additional Questions   Roomed by SAUL Roldan   Accompanied by N/A       Video Start Time: 12:48 PM    History of Present Illness       Reason for visit:  Positive Covid test  Symptom onset:  1-3 days ago  Symptoms include:  Cold /flu like, tight chest, cough, sore throat  Symptom intensity:  Moderate  Symptom progression:  Staying the same  Had these symptoms before:  Yes  Has tried/received treatment for these symptoms:  No He is missing 1 dose(s) of medications per week.  He is not taking prescribed medications regularly due to remembering to take.       Acute Illness  Acute illness concerns: COVID  Onset/Duration: Pt reports symptoms started Friday (12/20). Pt reports testing positive with home test yesterday (12/22).  "  Symptoms:  Fever: unsure  Chills/Sweats: YES  Headache (location?): YES  Sinus Pressure: No  Conjunctivitis:  No  Ear Pain: no  Rhinorrhea: YES  Congestion: YES  Sore Throat: YES  Cough: YES-productive of yellow sputum  Wheeze: No  Decreased Appetite: YES  Nausea: No  Vomiting: No  Diarrhea: No - Pt reports constipation  Dysuria/Freq.: No  Dysuria or Hematuria: No  Fatigue/Achiness: YES - Pt reports \"both\"  Sick/Strep Exposure: YES - Pt reports roommate tested positive for COVID on Friday  Therapies tried and outcome: DayQuil, NyQuil, ibuprofen - Pt reports helping with achiness.             Objective           Vitals:  No vitals were obtained today due to virtual visit.    Physical Exam   GENERAL: alert and no distress but slightly fatigued.   EYES: Eyes grossly normal to inspection.  No discharge or erythema, or obvious scleral/conjunctival abnormalities.  RESP: No audible wheeze, cough, or visible cyanosis.    SKIN: Visible skin clear. No significant rash, abnormal pigmentation or lesions.  NEURO: Cranial nerves grossly intact.  Mentation and speech appropriate for age.  PSYCH: Appropriate affect, tone, and pace of words          Video-Visit Details    Type of service:  Video Visit   Video End Time: 12:36-12:45  Originating Location (pt. Location): Home    Distant Location (provider location):  On-site  Platform used for Video Visit: Julissa  Signed Electronically by: Joel Daniel Wegener, MD    "

## 2024-12-23 NOTE — PATIENT INSTRUCTIONS
Dear Abimael,    We are sorry you are not feeling well. Based on the responses you provided, it is recommended that you schedule a Virtual Visit so we can better evaluate your symptoms. Please schedule this visit in [facility]. You will have a Schedule Now button in [facility] to help with scheduling this appointment. Otherwise, you can call [phone] to schedule an appointment.     You will not be charged for this eVisit. Thank you for trusting us with your care.     CHHAYA SALAZAR CNP    Coronavirus (COVID-19): Care Instructions  What is COVID-19?  COVID-19 is a disease caused by a type of coronavirus. This illness was first found in 2019 and has since spread worldwide (pandemic). Symptoms can range from mild, such as fever and body aches, to severe, including trouble breathing. COVID-19 can be deadly.  Coronaviruses are a large group of viruses. Some types cause the common cold. Others cause more serious illnesses like Middle East respiratory syndrome (MERS) and severe acute respiratory syndrome (SARS).  Follow-up care is a key part of your treatment and safety. Be sure to make and go to all appointments, and call your doctor if you are having problems. It's also a good idea to know your test results and keep a list of the medicines you take.  How can you self-isolate when you have COVID-19?  If you have COVID-19, there are things you can do to help avoid spreading the virus to others.  Stay home, and avoid contact with other people.  Limit contact with people in your home. If possible, stay in a separate bedroom and use a separate bathroom.  Wear a high-quality mask when you are around other people.  Improve airflow. If you have to spend time indoors with others, open windows and doors. Or you can use a fan to blow air away from people and out a window.  Avoid contact with pets and other animals.  Cover your mouth and nose with a tissue when you cough or sneeze. Then throw it in the trash right away.  Wash  "your hands often, especially after you cough or sneeze. Use soap and water, and scrub for at least 20 seconds. If soap and water aren't available, use an alcohol-based hand .  Don't share personal household items. These include bedding, towels, cups and glasses, and eating utensils.  Wash laundry in the warmest water allowed for the fabric type, and dry it completely. It's okay to wash other people's laundry with yours.  Clean and disinfect your home. Use household  and disinfectant wipes or sprays.  Go to the CDC website at cdc.gov if you have questions.  When can you end self-isolation for COVID-19?  If you know or think that you have the virus, you may need to self-isolate. When you can be around other people you live with and leave home depends on whether you have symptoms.  If you tested positive but had no symptoms, wear a mask for at least 5 days.  If you have symptoms, you need to wait until your symptoms are getting better and you haven't had a fever for 24 hours while not taking medicines to lower the fever. Once you leave isolation, wear a mask for at least 5 more days when you are around other people.  If you were very sick, were in the hospital for COVID, or have a weakened immune system, talk to your doctor about how long you should isolate and wear a mask. It might be longer than 5 days.  Call your doctor or seek care if you have questions about your symptoms or when to end isolation.  Check the CDC website at cdc.gov for the most current information.  Where can you learn more?  Go to https://www.[employer].net/patiented  Enter C007 in the search box to learn more about \"Coronavirus (COVID-19): Care Instructions.\"  Current as of: October 28, 2024  Content Version: 14.3    2024 [employer].   Care instructions adapted under license by your healthcare professional. If you have questions about a medical condition or this instruction, always ask your healthcare professional. " [email or web address], [facility] disclaims any warranty or liability for your use of this information.

## 2025-01-06 ENCOUNTER — TELEPHONE (OUTPATIENT)
Dept: FAMILY MEDICINE | Facility: CLINIC | Age: 75
End: 2025-01-06
Payer: COMMERCIAL

## 2025-01-06 NOTE — TELEPHONE ENCOUNTER
Patient calling with follow up Covid questions  Positive on 12/23/25 - completed VV and paxlovid Rx  Symptoms have been improving  Experiencing continued chest congestion, fatigue  Patient inquiring about Covid quarantine status  Reviewed CDC recommendations  Patient verbalized understanding, agreed with plan of care, and will call back if any new or worsening symptoms  Alysha Guido RN

## 2025-02-11 DIAGNOSIS — G47.00 INSOMNIA, UNSPECIFIED TYPE: ICD-10-CM

## 2025-02-11 RX ORDER — TEMAZEPAM 15 MG/1
15 CAPSULE ORAL
Qty: 30 CAPSULE | Refills: 1 | Status: SHIPPED | OUTPATIENT
Start: 2025-02-11

## 2025-02-20 ENCOUNTER — NURSE TRIAGE (OUTPATIENT)
Dept: FAMILY MEDICINE | Facility: CLINIC | Age: 75
End: 2025-02-20
Payer: COMMERCIAL

## 2025-02-20 NOTE — TELEPHONE ENCOUNTER
"Pt calling to request OV d/t sore throat.   States throat pain is moderate to severe and \"I need to be seen\".   Denies SOB and/or chest pain.   Denies dysphasia, cough and/or congestion.    Red flag symptoms reviewed at length.   Care advice given  All questions answered.   OV appt scheduled tomorrow at 1530; arrival time 1510.   Pt verbalized understanding of the provided information.     Reason for Disposition   Patient requesting a strep throat test    Additional Information   Negative: SEVERE difficulty breathing (e.g., struggling for each breath, speaks in single words)   Negative: Sounds like a life-threatening emergency to the triager   Negative: Throat culture results, call about   Negative: Productive cough is main symptom   Negative: Runny nose is main symptom   Negative: Drooling or spitting out saliva (because can't swallow)   Negative: Unable to open mouth completely   Negative: Drinking very little and has signs of dehydration (e.g., no urine > 12 hours, very dry mouth, very lightheaded)   Negative: Patient sounds very sick or weak to the triager   Negative: Difficulty breathing (per caller) but not severe   Negative: Fever > 103 F (39.4 C)   Negative: Refuses to drink anything for > 12 hours   Negative: SEVERE sore throat pain   Negative: Pus on tonsils (back of throat) and swollen neck lymph nodes ('glands')   Negative: Earache also present   Negative: Widespread rash (especially chest and abdomen)   Negative: Diabetes mellitus or weak immune system (e.g., HIV positive, cancer chemo, splenectomy, organ transplant, chronic steroids)   Negative: History of rheumatic fever   Negative: Patient wants to be seen   Negative: Fever present > 3 days (72 hours)    Protocols used: Sore Throat-A-OH    "

## 2025-02-20 NOTE — TELEPHONE ENCOUNTER
Pt calling to schedule same day appt for sore painful throat    Sx onset >1 week   Reports swollen lymph nodes in back of throat, sore throat and area is tender to touch    Denies fever, dysphagia, dyspnea, cough, congestion, no redness or white spots visible on back of throat    Please call pt back today with acute appt availability tomorrow  Does not want to go to     Thank you  Sarah HESS RN  [facility]

## 2025-02-21 PROBLEM — F10.21 ALCOHOL DEPENDENCE IN EARLY FULL REMISSION (H): Status: RESOLVED | Noted: 2019-06-20 | Resolved: 2025-02-21

## 2025-02-25 ENCOUNTER — OFFICE VISIT (OUTPATIENT)
Dept: FAMILY MEDICINE | Facility: CLINIC | Age: 75
End: 2025-02-25
Payer: COMMERCIAL

## 2025-02-25 ENCOUNTER — NURSE TRIAGE (OUTPATIENT)
Dept: FAMILY MEDICINE | Facility: CLINIC | Age: 75
End: 2025-02-25

## 2025-02-25 ENCOUNTER — ANCILLARY PROCEDURE (OUTPATIENT)
Dept: GENERAL RADIOLOGY | Facility: CLINIC | Age: 75
End: 2025-02-25
Attending: FAMILY MEDICINE
Payer: COMMERCIAL

## 2025-02-25 VITALS
RESPIRATION RATE: 19 BRPM | WEIGHT: 191 LBS | HEART RATE: 78 BPM | SYSTOLIC BLOOD PRESSURE: 122 MMHG | BODY MASS INDEX: 28.95 KG/M2 | TEMPERATURE: 97.9 F | HEIGHT: 68 IN | OXYGEN SATURATION: 96 % | DIASTOLIC BLOOD PRESSURE: 68 MMHG

## 2025-02-25 DIAGNOSIS — R05.1 ACUTE COUGH: ICD-10-CM

## 2025-02-25 DIAGNOSIS — R68.83 CHILLS: ICD-10-CM

## 2025-02-25 DIAGNOSIS — R05.8 SPASMODIC COUGH: ICD-10-CM

## 2025-02-25 DIAGNOSIS — R05.1 ACUTE COUGH: Primary | ICD-10-CM

## 2025-02-25 DIAGNOSIS — Z87.891 EX-SMOKER: ICD-10-CM

## 2025-02-25 DIAGNOSIS — R91.1 NODULE OF APEX OF LEFT LUNG: ICD-10-CM

## 2025-02-25 DIAGNOSIS — J10.1 INFLUENZA A: ICD-10-CM

## 2025-02-25 LAB
FLUAV RNA SPEC QL NAA+PROBE: POSITIVE
FLUBV RNA RESP QL NAA+PROBE: NEGATIVE
RSV RNA SPEC NAA+PROBE: NEGATIVE
SARS-COV-2 RNA RESP QL NAA+PROBE: NEGATIVE

## 2025-02-25 PROCEDURE — 71046 X-RAY EXAM CHEST 2 VIEWS: CPT | Mod: TC | Performed by: INTERNAL MEDICINE

## 2025-02-25 PROCEDURE — 1126F AMNT PAIN NOTED NONE PRSNT: CPT | Performed by: FAMILY MEDICINE

## 2025-02-25 PROCEDURE — 87637 SARSCOV2&INF A&B&RSV AMP PRB: CPT | Performed by: FAMILY MEDICINE

## 2025-02-25 PROCEDURE — 99214 OFFICE O/P EST MOD 30 MIN: CPT | Performed by: FAMILY MEDICINE

## 2025-02-25 PROCEDURE — 3074F SYST BP LT 130 MM HG: CPT | Performed by: FAMILY MEDICINE

## 2025-02-25 PROCEDURE — 3078F DIAST BP <80 MM HG: CPT | Performed by: FAMILY MEDICINE

## 2025-02-25 RX ORDER — BENZONATATE 100 MG/1
100 CAPSULE ORAL 3 TIMES DAILY PRN
Qty: 30 CAPSULE | Refills: 1 | Status: SHIPPED | OUTPATIENT
Start: 2025-02-25

## 2025-02-25 RX ORDER — OSELTAMIVIR PHOSPHATE 75 MG/1
75 CAPSULE ORAL 2 TIMES DAILY
Qty: 10 CAPSULE | Refills: 0 | Status: SHIPPED | OUTPATIENT
Start: 2025-02-25 | End: 2025-03-02

## 2025-02-25 ASSESSMENT — PAIN SCALES - GENERAL: PAINLEVEL_OUTOF10: NO PAIN (0)

## 2025-02-25 NOTE — PROGRESS NOTES
Assessment & Plan       ICD-10-CM    1. Acute cough  R05.1 Influenza A/B, RSV and SARS-CoV2 PCR (COVID-19)     XR Chest 2 Views     Influenza A/B, RSV and SARS-CoV2 PCR (COVID-19) Nose     benzonatate (TESSALON) 100 MG capsule      2. Spasmodic cough  R05.8 Influenza A/B, RSV and SARS-CoV2 PCR (COVID-19)     XR Chest 2 Views     Influenza A/B, RSV and SARS-CoV2 PCR (COVID-19) Nose     benzonatate (TESSALON) 100 MG capsule      3. Chills  R68.83 Influenza A/B, RSV and SARS-CoV2 PCR (COVID-19)     XR Chest 2 Views     Influenza A/B, RSV and SARS-CoV2 PCR (COVID-19) Nose      4. Ex-smoker  Z87.891 Influenza A/B, RSV and SARS-CoV2 PCR (COVID-19)     XR Chest 2 Views     Influenza A/B, RSV and SARS-CoV2 PCR (COVID-19) Nose      5. Influenza A  J10.1 oseltamivir (TAMIFLU) 75 MG capsule      6. Nodule of apex of left lung  R91.1 CT Chest w/o Contrast         Pt will since last week, with runny nose and lymph node enlargement- seen, labs done which were reassuring.  Lymph node has gone down, still with runny nose, but now with bad cough and chills/sweats and body aches (no fevers).  Cough is spasmodic and hurts when he gets in the spasms.  His vaccines are UTD (flu, covid, rsv, Tdap). He just had covid about a month ago.  --Will check flu/rsv/covid swab and cxr.  CXR okay to clinic read.  --Will send in tessalon for cough, okay to continue alesia/dm at night as well.  Okay to use acetominophen/ibuprpofen alternating doses for body aches.    Addendum- positive for influenza A.  Rest negative.  Called pt to discuss txt with tamiflu (higher risk due to age, worsening sx's). Risks and benefits of medication(s) including potential side effects reviewed with patient.  Questions answered.  He'll fill it early tomorrow morning.            Subjective   Abimael is a 74 year old, presenting for the following health issues:  URI (Lung pain /Cough/Heachaches/)        2/25/2025    12:43 PM   Additional Questions   Roomed by yusuf mcfarland  "  Accompanied by randy         2/25/2025    12:43 PM   Patient Reported Additional Medications   Patient reports taking the following new medications n/a     URI       Started getting sick with nasal dripping and lymph node- neck nodule is better, still with lots of nasal discharge.  Neg strep, mono and CBC.  Those sx's stayed.  Then got cough/aching on Sunday.  Sweats/chills, especially at night.  Hasn't checked temp at home.  Muscle aches all over.    Coughing is such a deep hack- taking Carl-DM- helps.  If he starts coughing, it hurts his whole lungs, and coughs so much he gets dizzy and nauseated.    Not coughing and phlegm.    He's always a bit SOB- went to [place]- didn't find anything, just said 'abnl breathing'.    No asthma or COPD.  Smoked x 50 yrs, ~1ppd.  Stopped ~15 yrs ago.    Hasn't been out much lately.      Patient Active Problem List   Diagnosis    HTN (hypertension)    Hyperlipidemia    Insomnia    LVH (left ventricular hypertrophy)    Ex-smoker    Dysthymia    Nocturia    Moderate episode of recurrent major depressive disorder (H)         Allergies   Allergen Reactions    Penicillins Hives             Review of Systems  Constitutional, neuro, ENT, endocrine, pulmonary, cardiac, gastrointestinal, genitourinary, musculoskeletal, integument and psychiatric systems are negative, except as otherwise noted.      Objective    /68 (BP Location: Left arm, Patient Position: Sitting, Cuff Size: Adult Regular)   Pulse 78   Temp 97.9  F (36.6  C) (Temporal)   Resp 19   Ht 1.727 m (5' 8\")   Wt 86.6 kg (191 lb)   SpO2 96%   BMI 29.04 kg/m    Body mass index is 29.04 kg/m .  Physical Exam   GENERAL: alert and no distress, sweating profusely on head, appears fatigued  EYES: Eyes grossly normal to inspection, PERRL and conjunctivae and sclerae normal  HENT: ear canals and TM's normal, nose and mouth without ulcers or lesions  NECK: no adenopathy, no asymmetry, masses, or scars  RESP: lungs clear to " auscultation - no rales, rhonchi or wheezes  CV: regular rate and rhythm, normal S1 S2, no S3 or S4, no murmur, click or rub, no peripheral edema  ABDOMEN: soft, nontender, no hepatosplenomegaly, no masses and bowel sounds normal  MS: no gross musculoskeletal defects noted, no edema  PSYCH: mentation appears normal, affect normal/bright    Results for orders placed or performed in visit on 02/25/25   XR Chest 2 Views     Status: None   Result Value Ref Range    Radiologist flags Lung nodule     Narrative    EXAM: XR CHEST 2 VIEWS  LOCATION: [facility]  DATE: 2/25/2025    INDICATION:  Ex-smoker, Chills, Acute cough, Spasmodic cough  COMPARISON: 12/05/2017.      Impression    IMPRESSION: Cardiac silhouette is within normal limits. Linear opacities of the left lung base are favored atelectasis. There is a possible pulmonary nodule of the left lung apex for which further evaluation with chest CT within 3 months is recommended.   No pleural effusion or pneumothorax.      [Recommend Follow Up: Lung nodule]    This report will be copied to the [facility] to ensure a provider acknowledges the finding.   Results for orders placed or performed in visit on 02/25/25   Influenza A/B, RSV and SARS-CoV2 PCR (COVID-19) Nose     Status: Abnormal    Specimen: Nose; Swab   Result Value Ref Range    Influenza A PCR Positive (A) Negative    Influenza B PCR Negative Negative    RSV PCR Negative Negative    SARS CoV2 PCR Negative Negative    Narrative    Testing was performed using the Xpert Xpress CoV2/Flu/RSV Assay on the [employer] GeneXpert Instrument. This test should be ordered for the detection of SARS-CoV2, influenza, and RSV viruses in individuals with signs and symptoms of respiratory tract infection. This test is for in vitro diagnostic use under the US FDA for laboratories certified under CLIA to perform high or moderate complexity testing. This test has been US FDA cleared. A negative result does  not rule out the presence of PCR inhibitors in the specimen or target RNA in concentration below the limit of detection for the assay. If only one viral target is positive but coinfection with multiple targets is suspected, the sample should be re-tested with another FDA cleared, approved, or authorized test, if coninfection would change clinical management. This test was validated by the [facility]. These laboratories are certified under the Clinical Laboratory Improvement Amendments of 1988 (CLIA-88) as qualified to perfom high complexity laboratory testing.     No results found for this visit on 02/25/25.        Signed Electronically by: Ruthie Rose MD

## 2025-02-25 NOTE — TELEPHONE ENCOUNTER
[facility]    Nurse Triage SBAR    Is this a 2nd Level Triage? NO    S-(situation):     [facility] Transfer.  Patient calling for same day appointment.    B-(background):     Symptoms started Saturday night.  Patient has not tested for Covid.    A-(assessment):     Runny nose/ Congestion.  Cough (not productive). Cough causing lungs to hurt with coughing (reports forceful cough).  Body Aches  No fever    R-(recommendations):     Appointment scheduled today.    Patient instructed to call back if symptoms change or if new symptoms present. Patient verbalizes agreement with plan.    Marissa, RN, BSN  [place], [place]    Protocol Recommended Disposition:   See in Office Today or Tomorrow    Reason for Disposition   Patient wants to be seen    Additional Information   Negative: Bluish (or gray) lips or face   Negative: SEVERE difficulty breathing (e.g., struggling for each breath, speaks in single words)   Negative: Rapid onset of cough and has hives   Negative: Coughing started suddenly after medicine, an allergic food or bee sting   Negative: Difficulty breathing after exposure to flames, smoke, or fumes   Negative: Sounds like a life-threatening emergency to the triager   Negative: Previous asthma attacks and this feels like asthma attack   Negative: Dry cough (non-productive; no sputum or minimal clear sputum) and within 14 days of COVID-19 Exposure   Negative: MODERATE difficulty breathing (e.g., speaks in phrases, SOB even at rest, pulse 100-120) and still present when not coughing   Negative: Chest pain present when not coughing   Negative: Passed out (e.g., fainted, lost consciousness, blacked out and was not responding)   Negative: Patient sounds very sick or weak to the triager   Negative: MILD difficulty breathing (e.g., minimal/no SOB at rest, SOB with walking, pulse <100) and still present when not coughing   Negative: Coughed up > 1 tablespoon (15 ml) blood  (Exception: Blood-tinged sputum.)   Negative: Fever > 103 F (39.4 C)   Negative: Fever > 101 F (38.3 C) and over 60 years of age   Negative: Fever > 100 F (37.8 C) and has diabetes mellitus or a weak immune system (e.g., HIV positive, cancer chemotherapy, organ transplant, splenectomy, chronic steroids)   Negative: Fever > 100 F (37.8 C) and bedridden (e.g., CVA, chronic illness, recovering from surgery)   Negative: Increasing ankle swelling   Negative: Wheezing is present   Negative: SEVERE coughing spells (e.g., whooping sound after coughing, vomiting after coughing)   Negative: Coughing up coy-colored (reddish-brown) or blood-tinged sputum   Negative: Fever present > 3 days (72 hours)   Negative: Fever returns after gone for over 24 hours and symptoms worse or not improved   Negative: Using nasal washes and pain medicine > 24 hours and sinus pain persists   Negative: Known COPD or other severe lung disease (i.e., bronchiectasis, cystic fibrosis, lung surgery) and symptoms getting worse (i.e., increased sputum purulence or amount, increased breathing difficulty)   Negative: Continuous (nonstop) coughing interferes with work or school and no improvement using cough treatment per Care Advice    Protocols used: Cough-A-OH

## 2025-02-26 ENCOUNTER — TELEPHONE (OUTPATIENT)
Dept: FAMILY MEDICINE | Facility: CLINIC | Age: 75
End: 2025-02-26
Payer: COMMERCIAL

## 2025-02-26 LAB — RADIOLOGIST FLAGS: NORMAL

## 2025-02-26 NOTE — RESULT ENCOUNTER NOTE
Seven Varghese,    I hope you were able to get the oseltamivir (tamiflu) filled and started.    The chest xray looked okay, but they saw a potential pulmonary nodule in your left upper lung, and recommended getting a chest CT scan within the next three months.  I would recommend waiting until you are feeling better from the flu, though.  The study is ordered, so they should be calling you to schedule.  Please let us know if you are having trouble getting it scheduled.    Best,   Matthew Rose MD

## 2025-02-26 NOTE — TELEPHONE ENCOUNTER
Radiology reporting incidental findings on XR CHEST 2 VIEWS  dated yesterday 2/25/2025.     INDICATION:  Ex-smoker, Chills, Acute cough, Spasmodic cough  COMPARISON: 12/05/2017.                                                                      IMPRESSION: Cardiac silhouette is within normal limits. Linear opacities of the left lung base are favored atelectasis. There is a possible pulmonary nodule of the left lung apex for which further evaluation with chest CT within 3 months is recommended.   No pleural effusion or pneumothorax.       Routing to PCP and ordering provider CW hi-tara as KENN LARIOS RN

## 2025-02-26 NOTE — RESULT ENCOUNTER NOTE
Called pt yesterday evening to discuss results- see note addendum.  Tamiflu sent.  Matthew Rose MD

## 2025-02-26 NOTE — TELEPHONE ENCOUNTER
CXR reviewed and addressed in result notes.  Chest CT scan ordered to follow-up possible LIDIA nodule.  Result note msg sent to pt as below....    'Hi Abimael,    I hope you were able to get the oseltamivir (tamiflu) filled and started.    The chest xray looked okay, but they saw a potential pulmonary nodule in your left upper lung, and recommended getting a chest CT scan within the next three months.  I would recommend waiting until you are feeling better from the flu, though.  The study is ordered, so they should be calling you to schedule.  Please let us know if you are having trouble getting it scheduled.    Best,   Matthew Rose MD'

## 2025-03-06 ENCOUNTER — TELEPHONE (OUTPATIENT)
Dept: FAMILY MEDICINE | Facility: CLINIC | Age: 75
End: 2025-03-06
Payer: COMMERCIAL

## 2025-03-06 NOTE — TELEPHONE ENCOUNTER
Forms/Letter Request    Type of form/letter: OTHER:  PT progress examination 2/20/2025        Do we have the form/letter: Yes: order    Who is the form from? [facility]  (if other please explain)    Where did/will the form come from? form was faxed in    When is form/letter needed by:  asap fwd to wegener    How would you like the form/letter returned: Fax : [phone]    Patient Notified form requests are processed in 5-7 business days:N/A    Could we send this information to you in [employer] or would you prefer to receive a phone call?:   No preference   Okay to leave a detailed message?: N/A at Other phone number:  [phone]

## 2025-03-14 ENCOUNTER — ANCILLARY PROCEDURE (OUTPATIENT)
Dept: CT IMAGING | Facility: CLINIC | Age: 75
End: 2025-03-14
Attending: FAMILY MEDICINE
Payer: COMMERCIAL

## 2025-03-14 DIAGNOSIS — R91.1 NODULE OF APEX OF LEFT LUNG: ICD-10-CM

## 2025-03-14 PROCEDURE — 71250 CT THORAX DX C-: CPT | Mod: GC | Performed by: RADIOLOGY

## 2025-03-14 NOTE — RESULT ENCOUNTER NOTE
-Your follow-up chest CT scan showed that the nodule seen in your left upper lung while you were sick with the flu has now cleared, which is great. They did see cysts in your liver that look benign, and I reviewed a abdominal ultrasound from 2020 that showed similar sized cysts, meaning they are stable and fine.  No further follow-up is needed for either issue.    Please let me know if you have any questions.  Best,   Matthew Rose MD

## 2025-04-13 ENCOUNTER — HEALTH MAINTENANCE LETTER (OUTPATIENT)
Age: 75
End: 2025-04-13

## 2025-04-17 ENCOUNTER — OFFICE VISIT (OUTPATIENT)
Dept: FAMILY MEDICINE | Facility: CLINIC | Age: 75
End: 2025-04-17
Payer: COMMERCIAL

## 2025-04-17 ENCOUNTER — TELEPHONE (OUTPATIENT)
Dept: FAMILY MEDICINE | Facility: CLINIC | Age: 75
End: 2025-04-17

## 2025-04-17 VITALS
WEIGHT: 184.5 LBS | TEMPERATURE: 97.5 F | OXYGEN SATURATION: 97 % | HEART RATE: 67 BPM | SYSTOLIC BLOOD PRESSURE: 110 MMHG | DIASTOLIC BLOOD PRESSURE: 67 MMHG | RESPIRATION RATE: 16 BRPM | BODY MASS INDEX: 28.05 KG/M2

## 2025-04-17 DIAGNOSIS — L30.9 ECZEMA, UNSPECIFIED TYPE: ICD-10-CM

## 2025-04-17 DIAGNOSIS — M54.2 NECK PAIN: ICD-10-CM

## 2025-04-17 DIAGNOSIS — R35.1 NOCTURIA: Primary | ICD-10-CM

## 2025-04-17 DIAGNOSIS — Z12.5 SCREENING FOR PROSTATE CANCER: ICD-10-CM

## 2025-04-17 RX ORDER — TRIAMCINOLONE ACETONIDE 1 MG/G
OINTMENT TOPICAL 2 TIMES DAILY
Qty: 15 G | Refills: 1 | Status: SHIPPED | OUTPATIENT
Start: 2025-04-17

## 2025-04-17 ASSESSMENT — PATIENT HEALTH QUESTIONNAIRE - PHQ9: SUM OF ALL RESPONSES TO PHQ QUESTIONS 1-9: 12

## 2025-04-17 ASSESSMENT — PAIN SCALES - GENERAL: PAINLEVEL_OUTOF10: MODERATE PAIN (6)

## 2025-04-17 NOTE — TELEPHONE ENCOUNTER
Forms/Letter Request    Type of form/letter: PT Progress Examination   Date 3/31/25 Plan Of Care       Do we have the form/letter: Yes:     Who is the form from? [facility]     Where did/will the form come from? form was faxed in    When is form/letter needed by: asap    How would you like the form/letter returned: Fax : [phone]

## 2025-04-17 NOTE — PATIENT INSTRUCTIONS
Nocturia - reviewed flow studies/cystoscopy from three years ago.  Symptoms not worse/not better  prostate exam with only mildly enlarged prostate today.   Continue flomax since felt helped somewhat when started. Discussed strategies for actually doing kegel exercises to get benefit.     Neck/back - actually substantially better!  Working with [facility] and [facility].     Psa today.     Covid booster today.     Dry spot posterior scalp consistent with ezcema vs actinic keratosis.  Dermatology referral recommended/declined for  now since he states he had a dermatologist look at this previously and was just given an ointment.  If not resolved in two weeks then would still recommend dermatology skin check.     Follow up October for wellness/physical.

## 2025-04-17 NOTE — PROGRESS NOTES
"  Assessment & Plan     Nocturia - reviewed flow studies/cystoscopy from three years ago.  Symptoms not worse/not better  prostate exam with only mildly enlarged prostate today.   Continue flomax since felt helped somewhat when started. Discussed strategies for actually doing kegel exercises to get benefit.     Neck/back - actually substantially better!  Working with [facility] and wellness.     Psa today.     Covid booster today.     Dry spot posterior scalp consistent with ezcema vs actinic keratosis.  Dermatology referral recommended/declined for  now since he states he had a dermatologist look at this previously and was just given an ointment.  If not resolved in two weeks then would still recommend dermatology skin check.     Follow up October for wellness/physical.   Nocturia      Neck pain      Eczema, unspecified type    - triamcinolone (KENALOG) 0.1 % external ointment; Apply topically 2 times daily. To dry patch on scalp for two weeks.  If not resolved follow up with dermatologist for skin check    Screening for prostate cancer    - PROSTATE SPEC ANTIGEN SCREEN; Future  - PROSTATE SPEC ANTIGEN SCREEN          BMI  Estimated body mass index is 28.05 kg/m  as calculated from the following:    Height as of 2/25/25: 1.727 m (5' 8\").    Weight as of this encounter: 83.7 kg (184 lb 8 oz).             Elijah Varghese is a 74 year old, presenting for the following health issues:  No chief complaint on file.      4/17/2025    11:19 AM   Additional Questions   Roomed by Nany MOSELEY   Accompanied by n/a     History of Present Illness       Reason for visit:  6 month follow up for?  1--COVID-19 Vaccine due 4/15  2--Trouble urinating / dribbling A LOT  3--Prostate Test due 3/21   4--Tdap vaccine?  5--Tired ALL the time    He eats 2-3 servings of fruits and vegetables daily.He consumes 0 sweetened beverage(s) daily.He exercises with enough effort to increase his heart rate 30 to 60 minutes per day.  He exercises with " enough effort to increase his heart rate 4 days per week. He is missing 1 dose(s) of medications per week.  He is not taking prescribed medications regularly due to remembering to take.          Genitourinary - Male   Onset/Duration: A Few months   Description:   Dysuria (painful urination): No  Hematuria (blood in urine): No  Frequency: YES- every hour   Waking at night to urinate: YES  Hesitancy (delay in urine): YES  Retention (unable to empty): No  Decrease in urinary flow: No  Incontinence: No  Progression of Symptoms:  worsening  Accompanying Signs & Symptoms:  Fever: No  Back/Flank pain: No  Urethral discharge: No  Testicle lumps/masses/pain: No  Nausea and/or vomiting: No  Abdominal pain: No  History:   History of frequent UTI s: No  History of kidney stones: No  History of hernias: No  Personal or Family history of Prostate problems: No  Sexually active: No  Precipitating or alleviating factors: None  Therapies tried and outcome: none  Pain History:  When did you first notice your pain? Months ago   Have you seen this provider for your pain in the past? Yes   Where in your body do you have pain? Neck pain   Are you seeing anyone else for your pain? Yes - Dr. Wegener and Physical Therapy         12/9/2024     8:50 AM 12/23/2024    11:53 AM 2/21/2025    10:37 AM   PHQ-9 SCORE   PHQ-9 Total Score MyChart 9 (Mild depression) 8 (Mild depression) 11 (Moderate depression)   PHQ-9 Total Score 9  8  11        Patient-reported           10/23/2023     8:51 AM 3/21/2024     1:06 PM 5/6/2024    10:39 AM   NATALY-7 SCORE   Total Score 4 (minimal anxiety) 4 (minimal anxiety) 3 (minimal anxiety)   Total Score 4 4 3               Chronic Pain Follow Up:    Location of pain: Neck   Analgesia/pain control:    - Recent changes:  None     - Overall control: Comfortably manageable    - Current treatments: Physical Therapy and Ibuprofen and Tylenol    Adherence:     - Do you ever take more pain medicine than prescribed? No    -  When did you take your last dose of pain medicine?  Last week or early this week Ibuprofen and tylenol as need    Adverse effects: No   PDMP Review         Value Time User    State PDMP site checked  Yes 2/11/2025  7:52 AM Wegener, Joel Daniel Irwin, MD          Last CSA Agreement:   CSA -- Patient Level:    CSA: None found at the patient level.       Last UDS: 5/11/2011                      Objective    There were no vitals taken for this visit.  There is no height or weight on file to calculate BMI.  Physical Exam   As above            Signed Electronically by: Joel Daniel Wegener, MD

## 2025-04-17 NOTE — NURSING NOTE
Prior to immunization administration, verified patients identity using patient s name and date of birth. Please see Immunization Activity for additional information.     Screening Questionnaire for Adult Immunization    Are you sick today?   No   Do you have allergies to medications, food, a vaccine component or latex?   No   Have you ever had a serious reaction after receiving a vaccination?   No   Do you have a long-term health problem with heart, lung, kidney, or metabolic disease (e.g., diabetes), asthma, a blood disorder, no spleen, complement component deficiency, a cochlear implant, or a spinal fluid leak?  Are you on long-term aspirin therapy?   No   Do you have cancer, leukemia, HIV/AIDS, or any other immune system problem?   No   Do you have a parent, brother, or sister with an immune system problem?   No   In the past 3 months, have you taken medications that affect  your immune system, such as prednisone, other steroids, or anticancer drugs; drugs for the treatment of rheumatoid arthritis, Crohn s disease, or psoriasis; or have you had radiation treatments?   No   Have you had a seizure, or a brain or other nervous system problem?   No   During the past year, have you received a transfusion of blood or blood    products, or been given immune (gamma) globulin or antiviral drug?   No   For women: Are you pregnant or is there a chance you could become       pregnant during the next month?   No   Have you received any vaccinations in the past 4 weeks?   No     Immunization questionnaire answers were all negative.      Patient instructed to remain in clinic for 15 minutes afterwards, and to report any adverse reactions.     Screening performed by Nany Chopra on 4/17/2025 at 12:31 PM.

## 2025-05-08 ENCOUNTER — TRANSFERRED RECORDS (OUTPATIENT)
Dept: HEALTH INFORMATION MANAGEMENT | Facility: CLINIC | Age: 75
End: 2025-05-08
Payer: COMMERCIAL

## 2025-05-14 ENCOUNTER — TELEPHONE (OUTPATIENT)
Dept: FAMILY MEDICINE | Facility: CLINIC | Age: 75
End: 2025-05-14
Payer: COMMERCIAL

## 2025-05-14 NOTE — TELEPHONE ENCOUNTER
Forms/Letter Request    Type of form/letter: OTHER: PT plan of care  Sign and fax back only the signed page    Do we have the form/letter: Yes:     Who is the form from? [facility] (if other please explain)    Where did/will the form come from? form was faxed in    When is form/letter needed by: asap    How would you like the form/letter returned: Fax : [phone]    Patient Notified form requests are processed in 5-7 business days:N/A    Could we send this information to you in [employer] or would you prefer to receive a phone call?:   No preference     Okay to leave a detailed message?: N/A at Other phone number:  N/A

## 2025-05-21 ENCOUNTER — DOCUMENTATION ONLY (OUTPATIENT)
Dept: OTHER | Facility: CLINIC | Age: 75
End: 2025-05-21
Payer: COMMERCIAL

## 2025-05-29 DIAGNOSIS — F34.1 DYSTHYMIA: ICD-10-CM

## 2025-05-29 RX ORDER — CITALOPRAM HYDROBROMIDE 40 MG/1
40 TABLET ORAL DAILY
Qty: 90 TABLET | Refills: 1 | Status: SHIPPED | OUTPATIENT
Start: 2025-05-29

## 2025-06-05 ENCOUNTER — TELEPHONE (OUTPATIENT)
Dept: FAMILY MEDICINE | Facility: CLINIC | Age: 75
End: 2025-06-05
Payer: COMMERCIAL

## 2025-06-05 NOTE — TELEPHONE ENCOUNTER
Reason for Call:  Appointment Request    Patient requesting this type of appt:  Shoulder and Hip pain    Requested provider: Wegener, Joel Daniel Irwin    Reason patient unable to be scheduled: Not within requested timeframe    When does patient want to be seen/preferred time: 3-7 days (no Tuesdays from 10-2pm and no on Wednesday 6/5/25)    Comments: please contact patient and when he can be seen. Patient had a conversation via [employer] with PCP as well.     Could we send this information to you in [employer] or would you prefer to receive a phone call?:   No preference   Okay to leave a detailed message?: Yes at [phone]    Call taken on 6/5/2025 at 10:59 AM by Barb Watt

## 2025-06-10 ENCOUNTER — ANCILLARY PROCEDURE (OUTPATIENT)
Dept: GENERAL RADIOLOGY | Facility: CLINIC | Age: 75
End: 2025-06-10
Attending: FAMILY MEDICINE
Payer: COMMERCIAL

## 2025-06-10 ENCOUNTER — OFFICE VISIT (OUTPATIENT)
Dept: FAMILY MEDICINE | Facility: CLINIC | Age: 75
End: 2025-06-10
Payer: COMMERCIAL

## 2025-06-10 ENCOUNTER — TRANSFERRED RECORDS (OUTPATIENT)
Dept: HEALTH INFORMATION MANAGEMENT | Facility: CLINIC | Age: 75
End: 2025-06-10

## 2025-06-10 VITALS
HEIGHT: 69 IN | TEMPERATURE: 98 F | RESPIRATION RATE: 16 BRPM | OXYGEN SATURATION: 97 % | SYSTOLIC BLOOD PRESSURE: 109 MMHG | DIASTOLIC BLOOD PRESSURE: 70 MMHG | WEIGHT: 186 LBS | BODY MASS INDEX: 27.55 KG/M2 | HEART RATE: 66 BPM

## 2025-06-10 DIAGNOSIS — M25.512 CHRONIC LEFT SHOULDER PAIN: Primary | ICD-10-CM

## 2025-06-10 DIAGNOSIS — G89.29 CHRONIC LEFT SHOULDER PAIN: ICD-10-CM

## 2025-06-10 DIAGNOSIS — M25.551 HIP PAIN, RIGHT: ICD-10-CM

## 2025-06-10 DIAGNOSIS — G89.29 CHRONIC LEFT SHOULDER PAIN: Primary | ICD-10-CM

## 2025-06-10 DIAGNOSIS — H91.22 SUDDEN LEFT HEARING LOSS: ICD-10-CM

## 2025-06-10 DIAGNOSIS — L82.0 INFLAMED SEBORRHEIC KERATOSIS: ICD-10-CM

## 2025-06-10 DIAGNOSIS — M25.512 CHRONIC LEFT SHOULDER PAIN: ICD-10-CM

## 2025-06-10 PROCEDURE — 3074F SYST BP LT 130 MM HG: CPT | Performed by: FAMILY MEDICINE

## 2025-06-10 PROCEDURE — 17110 DESTRUCTION B9 LES UP TO 14: CPT | Performed by: FAMILY MEDICINE

## 2025-06-10 PROCEDURE — 1125F AMNT PAIN NOTED PAIN PRSNT: CPT | Performed by: FAMILY MEDICINE

## 2025-06-10 PROCEDURE — 3078F DIAST BP <80 MM HG: CPT | Performed by: FAMILY MEDICINE

## 2025-06-10 PROCEDURE — 99214 OFFICE O/P EST MOD 30 MIN: CPT | Mod: 25 | Performed by: FAMILY MEDICINE

## 2025-06-10 PROCEDURE — 73030 X-RAY EXAM OF SHOULDER: CPT | Mod: TC | Performed by: RADIOLOGY

## 2025-06-10 PROCEDURE — 73502 X-RAY EXAM HIP UNI 2-3 VIEWS: CPT | Mod: TC | Performed by: RADIOLOGY

## 2025-06-10 ASSESSMENT — PAIN SCALES - GENERAL: PAINLEVEL_OUTOF10: MILD PAIN (1)

## 2025-06-10 NOTE — PROGRESS NOTES
{PROVIDER CHARTING PREFERENCE:284506}    Subjective   Abimael is a 74 year old, presenting for the following health issues:  Shoulder Pain (Left shoulder pain for a month)      6/10/2025     3:43 PM   Additional Questions   Roomed by dimitri     Shoulder Pain    History of Present Illness       Reason for visit:  1 New hearing loss 2 Shoulder and hip pain 3 Lower back kinks 4 Center back itch spot 5 Moles 6 [place]?    He eats 2-3 servings of fruits and vegetables daily.He consumes 0 sweetened beverage(s) daily.He exercises with enough effort to increase his heart rate 30 to 60 minutes per day.  He exercises with enough effort to increase his heart rate 4 days per week. He is missing 1 dose(s) of medications per week.        {MA/LPN/RN Pre-Provider Visit Orders- hCG/UA/Strep (Optional):967877}  {SUPERLIST (Optional):455975}  {additonal problems for provider to add (Optional):729873}    {ROS Picklists (Optional):602279}      Objective    There were no vitals taken for this visit.  There is no height or weight on file to calculate BMI.  Physical Exam   {Exam List (Optional):190261}    {Diagnostic Test Results (Optional):041414}        Signed Electronically by: Joel Daniel Wegener, MD  {Email feedback regarding this note to [email or web address]   :345966}   "rate 30 to 60 minutes per day.  He exercises with enough effort to increase his heart rate 4 days per week. He is missing 1 dose(s) of medications per week.                      Objective    /70   Pulse 66   Temp 98  F (36.7  C) (Temporal)   Resp 16   Ht 1.753 m (5' 9\")   Wt 84.4 kg (186 lb)   SpO2 97%   BMI 27.47 kg/m    Body mass index is 27.47 kg/m .  Physical Exam   Tympanic membranes clear today.   SKs as above.             Signed Electronically by: Joel Daniel Wegener, MD    "

## 2025-06-10 NOTE — PATIENT INSTRUCTIONS
Sudden hearing loss left ear - no wax.  Recommend course of steroid andd follow up with ent provider urgently (has ent provider)    Right hip pain is now eventually       Left shoulder pain consistent with rotator cuff - pt order sent to [facility]    Itchy spot on back/multiple seborrheic keratoses

## 2025-06-11 ENCOUNTER — TRANSFERRED RECORDS (OUTPATIENT)
Dept: HEALTH INFORMATION MANAGEMENT | Facility: CLINIC | Age: 75
End: 2025-06-11
Payer: COMMERCIAL

## 2025-06-12 ENCOUNTER — E-VISIT (OUTPATIENT)
Dept: FAMILY MEDICINE | Facility: CLINIC | Age: 75
End: 2025-06-12
Payer: COMMERCIAL

## 2025-06-12 DIAGNOSIS — E66.3 OVERWEIGHT: Primary | ICD-10-CM

## 2025-06-12 PROCEDURE — 99207 PR NON-BILLABLE SERV PER CHARTING: CPT | Performed by: FAMILY MEDICINE

## 2025-06-18 ENCOUNTER — RESULTS FOLLOW-UP (OUTPATIENT)
Dept: FAMILY MEDICINE | Facility: CLINIC | Age: 75
End: 2025-06-18

## 2025-06-23 ENCOUNTER — TELEPHONE (OUTPATIENT)
Dept: FAMILY MEDICINE | Facility: CLINIC | Age: 75
End: 2025-06-23
Payer: COMMERCIAL

## 2025-06-23 NOTE — TELEPHONE ENCOUNTER
Forms/Letter Request    Type of form/letter: OTHER: PT progress examination-  date 6/11/25     Do we have the form/letter: yes    Who is the form from? [facility]  (if other please explain)    Where did/will the form come from? form was faxed in    When is form/letter needed by: asap    How would you like the form/letter returned: Fax : fax back only signed page to [phone]    Patient Notified form requests are processed in 5-7 business days:N/A    Could we send this information to you in [employer] or would you prefer to receive a phone call?:   No preference   Okay to leave a detailed message?: N/A at Other phone number:  N/A

## 2025-07-17 ENCOUNTER — TRANSFERRED RECORDS (OUTPATIENT)
Dept: HEALTH INFORMATION MANAGEMENT | Facility: CLINIC | Age: 75
End: 2025-07-17
Payer: COMMERCIAL

## 2025-08-04 DIAGNOSIS — N40.1 BENIGN PROSTATIC HYPERPLASIA WITH NOCTURIA: ICD-10-CM

## 2025-08-04 DIAGNOSIS — R35.1 BENIGN PROSTATIC HYPERPLASIA WITH NOCTURIA: ICD-10-CM

## 2025-08-04 RX ORDER — TAMSULOSIN HYDROCHLORIDE 0.4 MG/1
0.4 CAPSULE ORAL DAILY
Qty: 90 CAPSULE | Refills: 3 | Status: SHIPPED | OUTPATIENT
Start: 2025-08-04

## (undated) DEVICE — TUBING SUCTION MEDI-VAC 1/4"X20' N620A

## (undated) DEVICE — SOL WATER IRRIG 500ML BOTTLE 2F7113

## (undated) DEVICE — SUCTION MANIFOLD NEPTUNE 2 SYS 1 PORT 702-025-000

## (undated) DEVICE — SPECIMEN CONTAINER 3OZ W/FORMALIN 59901

## (undated) DEVICE — KIT ENDO TURNOVER/PROCEDURE CARRY-ON 101822

## (undated) DEVICE — GOWN IMPERVIOUS 2XL BLUE

## (undated) RX ORDER — SULFAMETHOXAZOLE/TRIMETHOPRIM 800-160 MG
TABLET ORAL
Status: DISPENSED
Start: 2022-12-28

## (undated) RX ORDER — LIDOCAINE HYDROCHLORIDE 20 MG/ML
JELLY TOPICAL
Status: DISPENSED
Start: 2022-11-15